# Patient Record
Sex: FEMALE | Race: BLACK OR AFRICAN AMERICAN | NOT HISPANIC OR LATINO | URBAN - METROPOLITAN AREA
[De-identification: names, ages, dates, MRNs, and addresses within clinical notes are randomized per-mention and may not be internally consistent; named-entity substitution may affect disease eponyms.]

---

## 2017-08-09 ENCOUNTER — EMERGENCY (EMERGENCY)
Facility: HOSPITAL | Age: 72
LOS: 1 days | Discharge: HOME | End: 2017-08-09
Attending: EMERGENCY MEDICINE
Payer: COMMERCIAL

## 2017-08-09 VITALS
HEART RATE: 87 BPM | SYSTOLIC BLOOD PRESSURE: 146 MMHG | TEMPERATURE: 98 F | RESPIRATION RATE: 18 BRPM | DIASTOLIC BLOOD PRESSURE: 85 MMHG | OXYGEN SATURATION: 99 %

## 2017-08-09 DIAGNOSIS — Z90.81 ACQUIRED ABSENCE OF SPLEEN: Chronic | ICD-10-CM

## 2017-08-09 PROCEDURE — 84484 ASSAY OF TROPONIN QUANT: CPT

## 2017-08-09 PROCEDURE — 82553 CREATINE MB FRACTION: CPT

## 2017-08-09 PROCEDURE — 71020: CPT | Mod: 26

## 2017-08-09 PROCEDURE — 82550 ASSAY OF CK (CPK): CPT

## 2017-08-09 PROCEDURE — 93005 ELECTROCARDIOGRAM TRACING: CPT

## 2017-08-09 PROCEDURE — 99285 EMERGENCY DEPT VISIT HI MDM: CPT | Mod: 25

## 2017-08-09 PROCEDURE — 99283 EMERGENCY DEPT VISIT LOW MDM: CPT | Mod: 25

## 2017-08-09 PROCEDURE — 85027 COMPLETE CBC AUTOMATED: CPT

## 2017-08-09 PROCEDURE — 83605 ASSAY OF LACTIC ACID: CPT

## 2017-08-09 PROCEDURE — 71046 X-RAY EXAM CHEST 2 VIEWS: CPT

## 2017-08-09 PROCEDURE — 80053 COMPREHEN METABOLIC PANEL: CPT

## 2017-08-09 RX ORDER — IBUPROFEN 200 MG
600 TABLET ORAL ONCE
Qty: 0 | Refills: 0 | Status: DISCONTINUED | OUTPATIENT
Start: 2017-08-09 | End: 2017-08-13

## 2017-08-09 NOTE — ED PROVIDER NOTE - CARE PLAN
Principal Discharge DX:	Cough  Secondary Diagnosis:	Pleuritic pain Principal Discharge DX:	Cough  Secondary Diagnosis:	Pleuritic pain  Secondary Diagnosis:	Compression fracture

## 2017-08-09 NOTE — ED PROVIDER NOTE - OBJECTIVE STATEMENT
71 F with hx of splenectomy, recently treated as outpatient for "chest infection" with course of levaquin, completed last saturday, complaining of cough, pain on cough/deep inspiration worsening today.  Patient denies fevers, had felt better until this AM.  No recent travel, no other complaints.  Patient has hx of PE, currently on A/C therapy. 71 F with hx of splenectomy, recently treated as outpatient for "chest infection" with course of levaquin, completed last saturday, complaining of cough, pain on cough/deep inspiration worsening today starting in am.  Patient denies fevers, had felt better until this AM.  No recent travel, no other complaints.  Patient has hx of PE, currently on A/C therapy.

## 2017-08-09 NOTE — DOWNTIME INTERRUPTION NOTE - WHICH MANUAL FORMS INITIATED?
Emergency Department Triage  Emergency Department Laboratory Orders  Emergency Department Nursing Care Record  Discharge

## 2017-08-09 NOTE — ED PROVIDER NOTE - MEDICAL DECISION MAKING DETAILS
will check for underlying pneumonia, likely pleuresy, well appearing, will control pain and reassess.

## 2017-08-09 NOTE — ED PROVIDER NOTE - PMH
Anemia    Asthma    Cholelithiasis    Compression fracture    Diverticulosis    Gastritis    Hiatal hernia    History of hypertension    ITP (idiopathic thrombocytopenic purpura)    Muscular dystrophy  on Prednisone 40 mg daily for a few years now  Pneumonia    Polymyositis  on Prednisone  Pulmonary embolism  in 11/2015, on Eliquis  Renal cyst, left    Type 2 diabetes mellitus without complication

## 2017-08-11 ENCOUNTER — TRANSCRIPTION ENCOUNTER (OUTPATIENT)
Age: 72
End: 2017-08-11

## 2017-08-13 DIAGNOSIS — M33.20 POLYMYOSITIS, ORGAN INVOLVEMENT UNSPECIFIED: ICD-10-CM

## 2017-08-13 DIAGNOSIS — E11.9 TYPE 2 DIABETES MELLITUS WITHOUT COMPLICATIONS: ICD-10-CM

## 2017-08-13 DIAGNOSIS — I10 ESSENTIAL (PRIMARY) HYPERTENSION: ICD-10-CM

## 2017-08-13 DIAGNOSIS — R05 COUGH: ICD-10-CM

## 2017-08-13 DIAGNOSIS — J45.909 UNSPECIFIED ASTHMA, UNCOMPLICATED: ICD-10-CM

## 2017-08-13 DIAGNOSIS — Z79.4 LONG TERM (CURRENT) USE OF INSULIN: ICD-10-CM

## 2017-08-13 DIAGNOSIS — Z88.1 ALLERGY STATUS TO OTHER ANTIBIOTIC AGENTS STATUS: ICD-10-CM

## 2017-08-13 DIAGNOSIS — Z86.711 PERSONAL HISTORY OF PULMONARY EMBOLISM: ICD-10-CM

## 2017-08-13 DIAGNOSIS — R07.81 PLEURODYNIA: ICD-10-CM

## 2017-08-13 DIAGNOSIS — Z79.01 LONG TERM (CURRENT) USE OF ANTICOAGULANTS: ICD-10-CM

## 2017-08-13 DIAGNOSIS — Z90.81 ACQUIRED ABSENCE OF SPLEEN: ICD-10-CM

## 2017-11-28 ENCOUNTER — APPOINTMENT (OUTPATIENT)
Dept: PULMONOLOGY | Facility: CLINIC | Age: 72
End: 2017-11-28
Payer: MEDICARE

## 2017-11-28 VITALS
SYSTOLIC BLOOD PRESSURE: 143 MMHG | WEIGHT: 111 LBS | HEART RATE: 78 BPM | HEIGHT: 64 IN | OXYGEN SATURATION: 100 % | TEMPERATURE: 98.1 F | DIASTOLIC BLOOD PRESSURE: 86 MMHG | BODY MASS INDEX: 18.95 KG/M2

## 2017-11-28 DIAGNOSIS — Z78.9 OTHER SPECIFIED HEALTH STATUS: ICD-10-CM

## 2017-11-28 DIAGNOSIS — Z86.79 PERSONAL HISTORY OF OTHER DISEASES OF THE CIRCULATORY SYSTEM: ICD-10-CM

## 2017-11-28 DIAGNOSIS — Z82.49 FAMILY HISTORY OF ISCHEMIC HEART DISEASE AND OTHER DISEASES OF THE CIRCULATORY SYSTEM: ICD-10-CM

## 2017-11-28 PROCEDURE — 99203 OFFICE O/P NEW LOW 30 MIN: CPT

## 2019-10-28 ENCOUNTER — EMERGENCY (EMERGENCY)
Facility: HOSPITAL | Age: 74
LOS: 1 days | Discharge: HOME | End: 2019-10-28
Attending: EMERGENCY MEDICINE
Payer: COMMERCIAL

## 2019-10-28 VITALS
RESPIRATION RATE: 22 BRPM | SYSTOLIC BLOOD PRESSURE: 132 MMHG | TEMPERATURE: 98 F | DIASTOLIC BLOOD PRESSURE: 79 MMHG | OXYGEN SATURATION: 99 % | HEART RATE: 96 BPM

## 2019-10-28 VITALS
HEIGHT: 59 IN | RESPIRATION RATE: 24 BRPM | OXYGEN SATURATION: 95 % | HEART RATE: 109 BPM | WEIGHT: 97 LBS | TEMPERATURE: 98 F | SYSTOLIC BLOOD PRESSURE: 154 MMHG | DIASTOLIC BLOOD PRESSURE: 88 MMHG

## 2019-10-28 DIAGNOSIS — Z90.81 ACQUIRED ABSENCE OF SPLEEN: Chronic | ICD-10-CM

## 2019-10-28 LAB
ACETONE SERPL-MCNC: NEGATIVE — SIGNIFICANT CHANGE UP
ALBUMIN SERPL ELPH-MCNC: 3.4 G/DL — LOW (ref 3.5–5)
ALP SERPL-CCNC: 81 U/L — SIGNIFICANT CHANGE UP (ref 40–120)
ALT FLD-CCNC: 22 U/L DA — SIGNIFICANT CHANGE UP (ref 10–60)
ANION GAP SERPL CALC-SCNC: 7 MMOL/L — SIGNIFICANT CHANGE UP (ref 5–17)
APTT BLD: 39.6 SEC — HIGH (ref 27.5–36.3)
AST SERPL-CCNC: 25 U/L — SIGNIFICANT CHANGE UP (ref 10–40)
BASOPHILS # BLD AUTO: 0.02 K/UL — SIGNIFICANT CHANGE UP (ref 0–0.2)
BASOPHILS NFR BLD AUTO: 0.2 % — SIGNIFICANT CHANGE UP (ref 0–2)
BILIRUB SERPL-MCNC: 0.4 MG/DL — SIGNIFICANT CHANGE UP (ref 0.2–1.2)
BUN SERPL-MCNC: 19 MG/DL — HIGH (ref 7–18)
CALCIUM SERPL-MCNC: 9.4 MG/DL — SIGNIFICANT CHANGE UP (ref 8.4–10.5)
CHLORIDE SERPL-SCNC: 110 MMOL/L — HIGH (ref 96–108)
CK SERPL-CCNC: 181 U/L — SIGNIFICANT CHANGE UP (ref 21–215)
CO2 SERPL-SCNC: 25 MMOL/L — SIGNIFICANT CHANGE UP (ref 22–31)
CREAT SERPL-MCNC: 0.87 MG/DL — SIGNIFICANT CHANGE UP (ref 0.5–1.3)
EOSINOPHIL # BLD AUTO: 0 K/UL — SIGNIFICANT CHANGE UP (ref 0–0.5)
EOSINOPHIL NFR BLD AUTO: 0 % — SIGNIFICANT CHANGE UP (ref 0–6)
GLUCOSE SERPL-MCNC: 112 MG/DL — HIGH (ref 70–99)
HCT VFR BLD CALC: 41.5 % — SIGNIFICANT CHANGE UP (ref 34.5–45)
HGB BLD-MCNC: 13.9 G/DL — SIGNIFICANT CHANGE UP (ref 11.5–15.5)
IMM GRANULOCYTES NFR BLD AUTO: 0.6 % — SIGNIFICANT CHANGE UP (ref 0–1.5)
INR BLD: 1.97 RATIO — HIGH (ref 0.88–1.16)
LACTATE SERPL-SCNC: 2 MMOL/L — SIGNIFICANT CHANGE UP (ref 0.7–2)
LYMPHOCYTES # BLD AUTO: 1.19 K/UL — SIGNIFICANT CHANGE UP (ref 1–3.3)
LYMPHOCYTES # BLD AUTO: 12.2 % — LOW (ref 13–44)
MAGNESIUM SERPL-MCNC: 2.2 MG/DL — SIGNIFICANT CHANGE UP (ref 1.6–2.6)
MCHC RBC-ENTMCNC: 30.7 PG — SIGNIFICANT CHANGE UP (ref 27–34)
MCHC RBC-ENTMCNC: 33.5 GM/DL — SIGNIFICANT CHANGE UP (ref 32–36)
MCV RBC AUTO: 91.6 FL — SIGNIFICANT CHANGE UP (ref 80–100)
MONOCYTES # BLD AUTO: 0.68 K/UL — SIGNIFICANT CHANGE UP (ref 0–0.9)
MONOCYTES NFR BLD AUTO: 7 % — SIGNIFICANT CHANGE UP (ref 2–14)
NEUTROPHILS # BLD AUTO: 7.77 K/UL — HIGH (ref 1.8–7.4)
NEUTROPHILS NFR BLD AUTO: 80 % — HIGH (ref 43–77)
NRBC # BLD: 0 /100 WBCS — SIGNIFICANT CHANGE UP (ref 0–0)
NT-PROBNP SERPL-SCNC: 228 PG/ML — HIGH (ref 0–125)
PLATELET # BLD AUTO: 345 K/UL — SIGNIFICANT CHANGE UP (ref 150–400)
POTASSIUM SERPL-MCNC: 3.5 MMOL/L — SIGNIFICANT CHANGE UP (ref 3.5–5.3)
POTASSIUM SERPL-SCNC: 3.5 MMOL/L — SIGNIFICANT CHANGE UP (ref 3.5–5.3)
PROT SERPL-MCNC: 8 G/DL — SIGNIFICANT CHANGE UP (ref 6–8.3)
PROTHROM AB SERPL-ACNC: 22.3 SEC — HIGH (ref 10–12.9)
RBC # BLD: 4.53 M/UL — SIGNIFICANT CHANGE UP (ref 3.8–5.2)
RBC # FLD: 14.4 % — SIGNIFICANT CHANGE UP (ref 10.3–14.5)
SODIUM SERPL-SCNC: 142 MMOL/L — SIGNIFICANT CHANGE UP (ref 135–145)
TROPONIN I SERPL-MCNC: <0.015 NG/ML — SIGNIFICANT CHANGE UP (ref 0–0.04)
WBC # BLD: 9.72 K/UL — SIGNIFICANT CHANGE UP (ref 3.8–10.5)
WBC # FLD AUTO: 9.72 K/UL — SIGNIFICANT CHANGE UP (ref 3.8–10.5)

## 2019-10-28 PROCEDURE — 83735 ASSAY OF MAGNESIUM: CPT

## 2019-10-28 PROCEDURE — 99284 EMERGENCY DEPT VISIT MOD MDM: CPT | Mod: 25

## 2019-10-28 PROCEDURE — 80053 COMPREHEN METABOLIC PANEL: CPT

## 2019-10-28 PROCEDURE — 83605 ASSAY OF LACTIC ACID: CPT

## 2019-10-28 PROCEDURE — 83880 ASSAY OF NATRIURETIC PEPTIDE: CPT

## 2019-10-28 PROCEDURE — 72131 CT LUMBAR SPINE W/O DYE: CPT | Mod: 26

## 2019-10-28 PROCEDURE — 85027 COMPLETE CBC AUTOMATED: CPT

## 2019-10-28 PROCEDURE — 87040 BLOOD CULTURE FOR BACTERIA: CPT

## 2019-10-28 PROCEDURE — 71045 X-RAY EXAM CHEST 1 VIEW: CPT | Mod: 26

## 2019-10-28 PROCEDURE — 36415 COLL VENOUS BLD VENIPUNCTURE: CPT

## 2019-10-28 PROCEDURE — 82550 ASSAY OF CK (CPK): CPT

## 2019-10-28 PROCEDURE — 82009 KETONE BODYS QUAL: CPT

## 2019-10-28 PROCEDURE — 72131 CT LUMBAR SPINE W/O DYE: CPT

## 2019-10-28 PROCEDURE — 93005 ELECTROCARDIOGRAM TRACING: CPT

## 2019-10-28 PROCEDURE — 85610 PROTHROMBIN TIME: CPT

## 2019-10-28 PROCEDURE — 71045 X-RAY EXAM CHEST 1 VIEW: CPT

## 2019-10-28 PROCEDURE — 85730 THROMBOPLASTIN TIME PARTIAL: CPT

## 2019-10-28 PROCEDURE — 99284 EMERGENCY DEPT VISIT MOD MDM: CPT

## 2019-10-28 PROCEDURE — 84484 ASSAY OF TROPONIN QUANT: CPT

## 2019-10-28 RX ORDER — ACETAMINOPHEN 500 MG
650 TABLET ORAL ONCE
Refills: 0 | Status: DISCONTINUED | OUTPATIENT
Start: 2019-10-28 | End: 2019-11-04

## 2019-10-28 NOTE — ED ADULT NURSE NOTE - OBJECTIVE STATEMENT
From UC c/o lower back pain and productive cough with intermittent fever, B/L lower extremity with edema

## 2019-10-28 NOTE — ED PROVIDER NOTE - CHIEF COMPLAINT
The patient is a 74y Female complaining of shortness of breath. The patient is a 74y Female complaining of back pain.

## 2019-10-28 NOTE — ED PROVIDER NOTE - OBJECTIVE STATEMENT
74 female with PMH of multiple fractures, CHF, DVT? and rest unclear came to hospital for bilateral hip pain for many years acutely getting worse today. Pt stated she always had it not related to any exertion or trauma but today she was not able to bear it. She went to urgent care for similar reason who did an X-ray spine and reported some fracture for which she came to hospital. She has vague complaints of chronic dry cough with off and on fever which is not bothering her at the moment. Pt denies weakness in legs, problems with urine, weight loss, recent travel, trauma. No chest pain, abdominal pain, focal weakness.   Home meds: Prednisone 5mg, Fludrocortisone 0.1, Toprol XL 25, Lasix 20, Eliquis 5mg, Platel 50, Norvasc 5. 74 female with PMH of multiple fractures, CHF, DVT?, ITP (s/p splenectomy) and rest unclear came to hospital for bilateral hip pain for many years acutely getting worse today. Pt stated she always had it not related to any exertion or trauma but today she was not able to bear it. She went to urgent care for similar reason who did an X-ray spine and reported some fracture for which she came to hospital. She has vague complaints of chronic dry cough with off and on fever which is not bothering her at the moment. Pt denies weakness in legs, problems with urine, weight loss, recent travel, trauma. No chest pain, abdominal pain, focal weakness.   Home meds: Prednisone 5mg, Fludrocortisone 0.1, Toprol XL 25, Lasix 20, Eliquis 5mg, Platel 50, Norvasc 5.

## 2019-10-28 NOTE — ED PROVIDER NOTE - PHYSICAL EXAMINATION
GENERAL: Well developed, Elderly female, NAD  HEENT:  Normocephalic/Atraumatic, reactive light reflex, moist mucous membranes  NECK: Supple, no JVD  RESP: Symmetric movement of the chest, clear to auscultation bilaterally  CVS: S1 and S2 audible, no murmur, rubs or gallops noted  GI: Normal active bowel sounds present, abdomen soft, non tender, non distended  EXTREMITIES:  2+ edema, no clubbing, cyanosis  MSK: 5/5 strength bilateral upper and lower extremities, bilateral back hip tenderness   PSYCH: Normal mood, normal affect observed  NEURO: Alert and oriented x 3

## 2019-10-28 NOTE — ED PROVIDER NOTE - ATTENDING CONTRIBUTION TO CARE
76 y.o. female with h/o ITP, currently on chronic Prednisone treatment, pt went to Urgent Care for chronic cough for 2 mos., whitish to yellowish sputum, no SOB, fever, recent travelling, also bitemporal HA since this am, no claudication, photophobia, weakness, pt took nothing.  Pt went to Urgent Care, CXR done, told "broken bone", advised to go to ED.  Pt walks with a walker. PE: pt appears to be frail, thin, mucosa- moist, Heart-S1S2RR, Lungs-clear, abd-BS, soft, NT, Ext- no calves tenderness, 2+ pitted edema, lower back-sl tenderness to palp., no CVAT, spine- kyphotic  pt sent in for spine fracture, will get lumbar CT, labs, CXR,

## 2019-10-28 NOTE — ED PROVIDER NOTE - PATIENT PORTAL LINK FT
You can access the FollowMyHealth Patient Portal offered by [facility] by registering at the following website: [email or web address]. By joining [facility]’s FollowMyHealth portal, you will also be able to view your health information using other applications (apps) compatible with our system.

## 2019-11-03 LAB
CULTURE RESULTS: SIGNIFICANT CHANGE UP
CULTURE RESULTS: SIGNIFICANT CHANGE UP
SPECIMEN SOURCE: SIGNIFICANT CHANGE UP
SPECIMEN SOURCE: SIGNIFICANT CHANGE UP

## 2020-01-25 ENCOUNTER — EMERGENCY (EMERGENCY)
Facility: HOSPITAL | Age: 75
LOS: 1 days | Discharge: ANOTHER HOSPITAL | End: 2020-01-25
Attending: EMERGENCY MEDICINE
Payer: COMMERCIAL

## 2020-01-25 ENCOUNTER — INPATIENT (INPATIENT)
Facility: HOSPITAL | Age: 75
LOS: 2 days | Discharge: HOME HEALTH | End: 2020-01-28
Attending: HOSPITALIST | Admitting: HOSPITALIST
Payer: MEDICARE

## 2020-01-25 VITALS
TEMPERATURE: 100 F | WEIGHT: 89.95 LBS | DIASTOLIC BLOOD PRESSURE: 114 MMHG | HEIGHT: 63 IN | HEART RATE: 98 BPM | RESPIRATION RATE: 20 BRPM | SYSTOLIC BLOOD PRESSURE: 180 MMHG | OXYGEN SATURATION: 99 %

## 2020-01-25 VITALS
DIASTOLIC BLOOD PRESSURE: 73 MMHG | SYSTOLIC BLOOD PRESSURE: 116 MMHG | HEIGHT: 63 IN | HEART RATE: 118 BPM | OXYGEN SATURATION: 94 % | RESPIRATION RATE: 17 BRPM | TEMPERATURE: 101 F

## 2020-01-25 VITALS
OXYGEN SATURATION: 100 % | TEMPERATURE: 102 F | RESPIRATION RATE: 17 BRPM | HEART RATE: 114 BPM | SYSTOLIC BLOOD PRESSURE: 175 MMHG | DIASTOLIC BLOOD PRESSURE: 110 MMHG

## 2020-01-25 DIAGNOSIS — Z90.81 ACQUIRED ABSENCE OF SPLEEN: Chronic | ICD-10-CM

## 2020-01-25 LAB
ALBUMIN SERPL ELPH-MCNC: 3.8 G/DL — SIGNIFICANT CHANGE UP (ref 3.5–5)
ALP SERPL-CCNC: 64 U/L — SIGNIFICANT CHANGE UP (ref 40–120)
ALT FLD-CCNC: 23 U/L DA — SIGNIFICANT CHANGE UP (ref 10–60)
ANION GAP SERPL CALC-SCNC: 10 MMOL/L — SIGNIFICANT CHANGE UP (ref 5–17)
AST SERPL-CCNC: 30 U/L — SIGNIFICANT CHANGE UP (ref 10–40)
BASOPHILS # BLD AUTO: 0.05 K/UL — SIGNIFICANT CHANGE UP (ref 0–0.2)
BASOPHILS NFR BLD AUTO: 0.4 % — SIGNIFICANT CHANGE UP (ref 0–2)
BILIRUB SERPL-MCNC: 0.6 MG/DL — SIGNIFICANT CHANGE UP (ref 0.2–1.2)
BUN SERPL-MCNC: 18 MG/DL — SIGNIFICANT CHANGE UP (ref 7–18)
CALCIUM SERPL-MCNC: 8.8 MG/DL — SIGNIFICANT CHANGE UP (ref 8.4–10.5)
CHLORIDE SERPL-SCNC: 109 MMOL/L — HIGH (ref 96–108)
CO2 SERPL-SCNC: 23 MMOL/L — SIGNIFICANT CHANGE UP (ref 22–31)
CREAT SERPL-MCNC: 0.67 MG/DL — SIGNIFICANT CHANGE UP (ref 0.5–1.3)
EOSINOPHIL # BLD AUTO: 0 K/UL — SIGNIFICANT CHANGE UP (ref 0–0.5)
EOSINOPHIL NFR BLD AUTO: 0 % — SIGNIFICANT CHANGE UP (ref 0–6)
GLUCOSE SERPL-MCNC: 107 MG/DL — HIGH (ref 70–99)
HCT VFR BLD CALC: 44.9 % — SIGNIFICANT CHANGE UP (ref 34.5–45)
HGB BLD-MCNC: 15.2 G/DL — SIGNIFICANT CHANGE UP (ref 11.5–15.5)
IMM GRANULOCYTES NFR BLD AUTO: 0.2 % — SIGNIFICANT CHANGE UP (ref 0–1.5)
LACTATE SERPL-SCNC: 1.6 MMOL/L — SIGNIFICANT CHANGE UP (ref 0.7–2)
LYMPHOCYTES # BLD AUTO: 1.63 K/UL — SIGNIFICANT CHANGE UP (ref 1–3.3)
LYMPHOCYTES # BLD AUTO: 11.9 % — LOW (ref 13–44)
MCHC RBC-ENTMCNC: 30.6 PG — SIGNIFICANT CHANGE UP (ref 27–34)
MCHC RBC-ENTMCNC: 33.9 GM/DL — SIGNIFICANT CHANGE UP (ref 32–36)
MCV RBC AUTO: 90.3 FL — SIGNIFICANT CHANGE UP (ref 80–100)
MONOCYTES # BLD AUTO: 1.29 K/UL — HIGH (ref 0–0.9)
MONOCYTES NFR BLD AUTO: 9.4 % — SIGNIFICANT CHANGE UP (ref 2–14)
NEUTROPHILS # BLD AUTO: 10.75 K/UL — HIGH (ref 1.8–7.4)
NEUTROPHILS NFR BLD AUTO: 78.1 % — HIGH (ref 43–77)
NRBC # BLD: 0 /100 WBCS — SIGNIFICANT CHANGE UP (ref 0–0)
PLATELET # BLD AUTO: 323 K/UL — SIGNIFICANT CHANGE UP (ref 150–400)
POTASSIUM SERPL-MCNC: 3.2 MMOL/L — LOW (ref 3.5–5.3)
POTASSIUM SERPL-SCNC: 3.2 MMOL/L — LOW (ref 3.5–5.3)
PROT SERPL-MCNC: 8.4 G/DL — HIGH (ref 6–8.3)
RBC # BLD: 4.97 M/UL — SIGNIFICANT CHANGE UP (ref 3.8–5.2)
RBC # FLD: 15 % — HIGH (ref 10.3–14.5)
SODIUM SERPL-SCNC: 142 MMOL/L — SIGNIFICANT CHANGE UP (ref 135–145)
WBC # BLD: 13.75 K/UL — HIGH (ref 3.8–10.5)
WBC # FLD AUTO: 13.75 K/UL — HIGH (ref 3.8–10.5)

## 2020-01-25 PROCEDURE — 70491 CT SOFT TISSUE NECK W/DYE: CPT

## 2020-01-25 PROCEDURE — 83605 ASSAY OF LACTIC ACID: CPT

## 2020-01-25 PROCEDURE — 36415 COLL VENOUS BLD VENIPUNCTURE: CPT

## 2020-01-25 PROCEDURE — 96374 THER/PROPH/DIAG INJ IV PUSH: CPT | Mod: XU

## 2020-01-25 PROCEDURE — 70491 CT SOFT TISSUE NECK W/DYE: CPT | Mod: 26

## 2020-01-25 PROCEDURE — 99283 EMERGENCY DEPT VISIT LOW MDM: CPT

## 2020-01-25 PROCEDURE — 85027 COMPLETE CBC AUTOMATED: CPT

## 2020-01-25 PROCEDURE — 99284 EMERGENCY DEPT VISIT MOD MDM: CPT | Mod: 25

## 2020-01-25 PROCEDURE — 80053 COMPREHEN METABOLIC PANEL: CPT

## 2020-01-25 PROCEDURE — 96375 TX/PRO/DX INJ NEW DRUG ADDON: CPT | Mod: XU

## 2020-01-25 RX ORDER — METRONIDAZOLE 500 MG
500 TABLET ORAL ONCE
Refills: 0 | Status: COMPLETED | OUTPATIENT
Start: 2020-01-25 | End: 2020-01-25

## 2020-01-25 RX ORDER — POTASSIUM CHLORIDE 20 MEQ
40 PACKET (EA) ORAL ONCE
Refills: 0 | Status: COMPLETED | OUTPATIENT
Start: 2020-01-25 | End: 2020-01-25

## 2020-01-25 RX ORDER — IBUPROFEN 200 MG
600 TABLET ORAL ONCE
Refills: 0 | Status: COMPLETED | OUTPATIENT
Start: 2020-01-25 | End: 2020-01-25

## 2020-01-25 RX ORDER — ACETAMINOPHEN 500 MG
1000 TABLET ORAL ONCE
Refills: 0 | Status: COMPLETED | OUTPATIENT
Start: 2020-01-25 | End: 2020-01-25

## 2020-01-25 RX ADMIN — Medication 600 MILLIGRAM(S): at 22:00

## 2020-01-25 RX ADMIN — Medication 1000 MILLIGRAM(S): at 19:00

## 2020-01-25 RX ADMIN — Medication 100 MILLIGRAM(S): at 21:25

## 2020-01-25 RX ADMIN — Medication 400 MILLIGRAM(S): at 18:25

## 2020-01-25 RX ADMIN — Medication 40 MILLIEQUIVALENT(S): at 21:26

## 2020-01-25 NOTE — ED PROVIDER NOTE - CHIEF COMPLAINT
Spoke with Pt. yesterday. [place] [facility] [place] [facility] the night prior to his procedure. Pt aware of time of procedure and instructions reviewed. Pt denied in further questions, needs or concerns.   The patient is a 74y Female complaining of abscess.

## 2020-01-25 NOTE — ED ADULT NURSE REASSESSMENT NOTE - NS ED NURSE REASSESS COMMENT FT1
pt awake laert oriented x 3 ,pt with chills,repeat vital done dr. Hugo informed with order for motrin 600 for transfer.

## 2020-01-25 NOTE — ED PROVIDER NOTE - OBJECTIVE STATEMENT
74 year old female with PMHx of anemia, asthma, cholelithiasis, diverticulosis, gastritis, hiatal hernia, HTN, ITP, muscular dystrophy, PNA, polymyositis, PE, renal cyst, and DM presenting to the ED with complaints of swelling to the right side of her neck since yesterday. Patient states that the swelling started off small, however, it has been getting larger. Patient denies mouth or dental issues, shortness of breath, fever, or any other acute complaints. Patient notes that she is able to swallow, but she feels a lot pain while doing so.

## 2020-01-25 NOTE — ED ADULT NURSE NOTE - NSIMPLEMENTINTERV_GEN_ALL_ED
Implemented All Fall with Harm Risk Interventions:  [place] to call system. Call bell, personal items and telephone within reach. Instruct patient to call for assistance. Room bathroom lighting operational. Non-slip footwear when patient is off stretcher. Physically safe environment: no spills, clutter or unnecessary equipment. Stretcher in lowest position, wheels locked, appropriate side rails in place. Provide visual cue, wrist band, yellow gown, etc. Monitor gait and stability. Monitor for mental status changes and reorient to person, place, and time. Review medications for side effects contributing to fall risk. Reinforce activity limits and safety measures with patient and family. Provide visual clues: red socks.

## 2020-01-25 NOTE — ED ADULT NURSE REASSESSMENT NOTE - NS ED NURSE REASSESS COMMENT FT1
received pt awake alert oriented x3 not in acute distress,with saline lock intact,no redness,no swelling noted,with family member at bedside,waiting for dispo.

## 2020-01-25 NOTE — ED ADULT TRIAGE NOTE - CHIEF COMPLAINT QUOTE
Pt brought in by ems from [place] for ENT consult. Pt has right side sialolithiases. Pt arrives on Levofloxacin/ Flagyl. Pt tachycardic and slightly hypoxic in triage sating at 94% on 2L via NC. PMH DM, Asthma, HTN

## 2020-01-26 DIAGNOSIS — K11.20 SIALOADENITIS, UNSPECIFIED: ICD-10-CM

## 2020-01-26 DIAGNOSIS — E11.9 TYPE 2 DIABETES MELLITUS WITHOUT COMPLICATIONS: ICD-10-CM

## 2020-01-26 DIAGNOSIS — A41.9 SEPSIS, UNSPECIFIED ORGANISM: ICD-10-CM

## 2020-01-26 DIAGNOSIS — I26.99 OTHER PULMONARY EMBOLISM WITHOUT ACUTE COR PULMONALE: ICD-10-CM

## 2020-01-26 DIAGNOSIS — I50.42 CHRONIC COMBINED SYSTOLIC (CONGESTIVE) AND DIASTOLIC (CONGESTIVE) HEART FAILURE: ICD-10-CM

## 2020-01-26 DIAGNOSIS — M33.20 POLYMYOSITIS, ORGAN INVOLVEMENT UNSPECIFIED: ICD-10-CM

## 2020-01-26 DIAGNOSIS — Z79.899 OTHER LONG TERM (CURRENT) DRUG THERAPY: ICD-10-CM

## 2020-01-26 DIAGNOSIS — Z29.9 ENCOUNTER FOR PROPHYLACTIC MEASURES, UNSPECIFIED: ICD-10-CM

## 2020-01-26 DIAGNOSIS — Z02.9 ENCOUNTER FOR ADMINISTRATIVE EXAMINATIONS, UNSPECIFIED: ICD-10-CM

## 2020-01-26 DIAGNOSIS — I10 ESSENTIAL (PRIMARY) HYPERTENSION: ICD-10-CM

## 2020-01-26 LAB
ALBUMIN SERPL ELPH-MCNC: 2.9 G/DL — LOW (ref 3.3–5)
ALP SERPL-CCNC: 46 U/L — SIGNIFICANT CHANGE UP (ref 40–120)
ALT FLD-CCNC: 11 U/L — SIGNIFICANT CHANGE UP (ref 4–33)
ANION GAP SERPL CALC-SCNC: 15 MMO/L — HIGH (ref 7–14)
AST SERPL-CCNC: 26 U/L — SIGNIFICANT CHANGE UP (ref 4–32)
BASOPHILS # BLD AUTO: 0.06 K/UL — SIGNIFICANT CHANGE UP (ref 0–0.2)
BASOPHILS NFR BLD AUTO: 0.3 % — SIGNIFICANT CHANGE UP (ref 0–2)
BILIRUB SERPL-MCNC: 0.7 MG/DL — SIGNIFICANT CHANGE UP (ref 0.2–1.2)
BUN SERPL-MCNC: 16 MG/DL — SIGNIFICANT CHANGE UP (ref 7–23)
CALCIUM SERPL-MCNC: 7.8 MG/DL — LOW (ref 8.4–10.5)
CHLORIDE SERPL-SCNC: 112 MMOL/L — HIGH (ref 98–107)
CO2 SERPL-SCNC: 16 MMOL/L — LOW (ref 22–31)
CREAT SERPL-MCNC: 0.64 MG/DL — SIGNIFICANT CHANGE UP (ref 0.5–1.3)
EOSINOPHIL # BLD AUTO: 0.01 K/UL — SIGNIFICANT CHANGE UP (ref 0–0.5)
EOSINOPHIL NFR BLD AUTO: 0 % — SIGNIFICANT CHANGE UP (ref 0–6)
GLUCOSE BLDC GLUCOMTR-MCNC: 161 MG/DL — HIGH (ref 70–99)
GLUCOSE BLDC GLUCOMTR-MCNC: 173 MG/DL — HIGH (ref 70–99)
GLUCOSE BLDC GLUCOMTR-MCNC: 175 MG/DL — HIGH (ref 70–99)
GLUCOSE BLDC GLUCOMTR-MCNC: 79 MG/DL — SIGNIFICANT CHANGE UP (ref 70–99)
GLUCOSE SERPL-MCNC: 68 MG/DL — LOW (ref 70–99)
GRAM STN SPEC: SIGNIFICANT CHANGE UP
HBA1C BLD-MCNC: 5.9 % — HIGH (ref 4–5.6)
HCT VFR BLD CALC: 38.7 % — SIGNIFICANT CHANGE UP (ref 34.5–45)
HGB BLD-MCNC: 12.7 G/DL — SIGNIFICANT CHANGE UP (ref 11.5–15.5)
IMM GRANULOCYTES NFR BLD AUTO: 0.6 % — SIGNIFICANT CHANGE UP (ref 0–1.5)
LYMPHOCYTES # BLD AUTO: 1.21 K/UL — SIGNIFICANT CHANGE UP (ref 1–3.3)
LYMPHOCYTES # BLD AUTO: 5.6 % — LOW (ref 13–44)
MAGNESIUM SERPL-MCNC: 1.6 MG/DL — SIGNIFICANT CHANGE UP (ref 1.6–2.6)
MCHC RBC-ENTMCNC: 30.6 PG — SIGNIFICANT CHANGE UP (ref 27–34)
MCHC RBC-ENTMCNC: 32.8 % — SIGNIFICANT CHANGE UP (ref 32–36)
MCV RBC AUTO: 93.3 FL — SIGNIFICANT CHANGE UP (ref 80–100)
MONOCYTES # BLD AUTO: 1.43 K/UL — HIGH (ref 0–0.9)
MONOCYTES NFR BLD AUTO: 6.7 % — SIGNIFICANT CHANGE UP (ref 2–14)
NEUTROPHILS # BLD AUTO: 18.61 K/UL — HIGH (ref 1.8–7.4)
NEUTROPHILS NFR BLD AUTO: 86.8 % — HIGH (ref 43–77)
NRBC # FLD: 0 K/UL — SIGNIFICANT CHANGE UP (ref 0–0)
PHOSPHATE SERPL-MCNC: 2.7 MG/DL — SIGNIFICANT CHANGE UP (ref 2.5–4.5)
PLATELET # BLD AUTO: 246 K/UL — SIGNIFICANT CHANGE UP (ref 150–400)
PMV BLD: 10.8 FL — SIGNIFICANT CHANGE UP (ref 7–13)
POTASSIUM SERPL-MCNC: 3.6 MMOL/L — SIGNIFICANT CHANGE UP (ref 3.5–5.3)
POTASSIUM SERPL-SCNC: 3.6 MMOL/L — SIGNIFICANT CHANGE UP (ref 3.5–5.3)
PROT SERPL-MCNC: 6 G/DL — SIGNIFICANT CHANGE UP (ref 6–8.3)
RBC # BLD: 4.15 M/UL — SIGNIFICANT CHANGE UP (ref 3.8–5.2)
RBC # FLD: 15.5 % — HIGH (ref 10.3–14.5)
SODIUM SERPL-SCNC: 143 MMOL/L — SIGNIFICANT CHANGE UP (ref 135–145)
SPECIMEN SOURCE: SIGNIFICANT CHANGE UP
WBC # BLD: 21.44 K/UL — HIGH (ref 3.8–10.5)
WBC # FLD AUTO: 21.44 K/UL — HIGH (ref 3.8–10.5)

## 2020-01-26 PROCEDURE — 99223 1ST HOSP IP/OBS HIGH 75: CPT | Mod: GC

## 2020-01-26 PROCEDURE — 12345: CPT | Mod: NC,GC

## 2020-01-26 RX ORDER — DEXTROSE 50 % IN WATER 50 %
25 SYRINGE (ML) INTRAVENOUS ONCE
Refills: 0 | Status: DISCONTINUED | OUTPATIENT
Start: 2020-01-26 | End: 2020-01-28

## 2020-01-26 RX ORDER — GLUCAGON INJECTION, SOLUTION 0.5 MG/.1ML
1 INJECTION, SOLUTION SUBCUTANEOUS ONCE
Refills: 0 | Status: DISCONTINUED | OUTPATIENT
Start: 2020-01-26 | End: 2020-01-28

## 2020-01-26 RX ORDER — SODIUM CHLORIDE 9 MG/ML
1000 INJECTION, SOLUTION INTRAVENOUS
Refills: 0 | Status: DISCONTINUED | OUTPATIENT
Start: 2020-01-26 | End: 2020-01-28

## 2020-01-26 RX ORDER — APIXABAN 2.5 MG/1
5 TABLET, FILM COATED ORAL
Refills: 0 | Status: DISCONTINUED | OUTPATIENT
Start: 2020-01-26 | End: 2020-01-28

## 2020-01-26 RX ORDER — DEXAMETHASONE 0.5 MG/5ML
10 ELIXIR ORAL EVERY 8 HOURS
Refills: 0 | Status: DISCONTINUED | OUTPATIENT
Start: 2020-01-26 | End: 2020-01-26

## 2020-01-26 RX ORDER — ACETAMINOPHEN 500 MG
650 TABLET ORAL EVERY 6 HOURS
Refills: 0 | Status: DISCONTINUED | OUTPATIENT
Start: 2020-01-26 | End: 2020-01-28

## 2020-01-26 RX ORDER — INSULIN LISPRO 100/ML
VIAL (ML) SUBCUTANEOUS AT BEDTIME
Refills: 0 | Status: DISCONTINUED | OUTPATIENT
Start: 2020-01-26 | End: 2020-01-28

## 2020-01-26 RX ORDER — INSULIN LISPRO 100/ML
VIAL (ML) SUBCUTANEOUS
Refills: 0 | Status: DISCONTINUED | OUTPATIENT
Start: 2020-01-26 | End: 2020-01-28

## 2020-01-26 RX ORDER — SODIUM CHLORIDE 9 MG/ML
1000 INJECTION INTRAMUSCULAR; INTRAVENOUS; SUBCUTANEOUS ONCE
Refills: 0 | Status: COMPLETED | OUTPATIENT
Start: 2020-01-26 | End: 2020-01-26

## 2020-01-26 RX ORDER — LACTOBACILLUS ACIDOPHILUS 100MM CELL
1 CAPSULE ORAL DAILY
Refills: 0 | Status: DISCONTINUED | OUTPATIENT
Start: 2020-01-26 | End: 2020-01-28

## 2020-01-26 RX ORDER — DEXTROSE 50 % IN WATER 50 %
15 SYRINGE (ML) INTRAVENOUS ONCE
Refills: 0 | Status: DISCONTINUED | OUTPATIENT
Start: 2020-01-26 | End: 2020-01-28

## 2020-01-26 RX ORDER — ACETAMINOPHEN 500 MG
975 TABLET ORAL ONCE
Refills: 0 | Status: COMPLETED | OUTPATIENT
Start: 2020-01-26 | End: 2020-01-26

## 2020-01-26 RX ORDER — DEXTROSE 50 % IN WATER 50 %
12.5 SYRINGE (ML) INTRAVENOUS ONCE
Refills: 0 | Status: DISCONTINUED | OUTPATIENT
Start: 2020-01-26 | End: 2020-01-28

## 2020-01-26 RX ADMIN — Medication 650 MILLIGRAM(S): at 22:30

## 2020-01-26 RX ADMIN — Medication 102 MILLIGRAM(S): at 15:29

## 2020-01-26 RX ADMIN — Medication 1 TABLET(S): at 11:38

## 2020-01-26 RX ADMIN — Medication 975 MILLIGRAM(S): at 01:02

## 2020-01-26 RX ADMIN — Medication 102 MILLIGRAM(S): at 08:10

## 2020-01-26 RX ADMIN — Medication 100 MILLIGRAM(S): at 21:37

## 2020-01-26 RX ADMIN — Medication 100 MILLIGRAM(S): at 07:19

## 2020-01-26 RX ADMIN — Medication 650 MILLIGRAM(S): at 12:32

## 2020-01-26 RX ADMIN — Medication 975 MILLIGRAM(S): at 02:30

## 2020-01-26 RX ADMIN — Medication 650 MILLIGRAM(S): at 13:33

## 2020-01-26 RX ADMIN — APIXABAN 5 MILLIGRAM(S): 2.5 TABLET, FILM COATED ORAL at 17:52

## 2020-01-26 RX ADMIN — SODIUM CHLORIDE 1000 MILLILITER(S): 9 INJECTION INTRAMUSCULAR; INTRAVENOUS; SUBCUTANEOUS at 01:02

## 2020-01-26 RX ADMIN — APIXABAN 5 MILLIGRAM(S): 2.5 TABLET, FILM COATED ORAL at 08:10

## 2020-01-26 RX ADMIN — Medication 1: at 18:24

## 2020-01-26 RX ADMIN — Medication 100 MILLIGRAM(S): at 14:56

## 2020-01-26 RX ADMIN — Medication 650 MILLIGRAM(S): at 21:32

## 2020-01-26 RX ADMIN — Medication 1: at 12:50

## 2020-01-26 NOTE — PROGRESS NOTE ADULT - PROBLEM SELECTOR PLAN 4
Pt does not know what medications she takes,  will bring them in am Pt states she is still taking Eliquis after starting it approx 5 years ago for PE and reports being told to stay on it  - will c/w Eliquis 5 mg bid for now  - clarify with PCP on Monday indication, patient is unsure if she has a history of irregular heart rhythm

## 2020-01-26 NOTE — PROGRESS NOTE ADULT - PROBLEM SELECTOR PLAN 3
Pt on prednisone 5 mg qd chronically  - will c/w prednisone 5 mg after the 24 hrs of decadron 10 mg q8 hr

## 2020-01-26 NOTE — ED PROVIDER NOTE - NS ED ROS FT
GENERAL: No weight changes, nightsweats  EYES: no change in vision  HEENT: HPI   CARDIAC: no chest pain, palpitation   PULMONARY: no productive cough or SOB  GI: no abdominal pain, no nausea or no vomiting, no diarrhea or constipation  : No changes in urination for pain/freq.   SKIN: no rashes, abnormal bruising or bleeding  NEURO: no headache, numbness/tingling, extremity weakness   MSK: No joint pain

## 2020-01-26 NOTE — PROGRESS NOTE ADULT - PROBLEM SELECTOR PLAN 7
Pt's  will bring her medications to the hospital in am, unable to be reached over the phone. Pt's pharmacy is [facility] on [address] in [place] and it opens on Monday

## 2020-01-26 NOTE — ED PROVIDER NOTE - ATTENDING CONTRIBUTION TO CARE
GEN: no acute respiratory distress. nontoxic, speaking comfortably in full sentences  HEENT: NCAT. right facial and neck swelling, tender locally to palpation. PERRL, EOMI, MMM, oropharynx wnl.  Neck: no JVD, trachea midline, no LAD, no stridor  CV: RRR. +S1S2, no murmur.   Chest: CTA B/l. no wheezing, rales, rhonchi. no retractions. good air movement.   ABD: +BS, soft, non distended, non tender. No guarding/rebound.   MSK: No clubbing, cyanosis, edema. FROM of all extremities. no tenderness to palpation. No midline or paraspinal tenderness.   Neuro: AAOX3. Sensation intact, motor 5/5 throughout.   SKIN: No rash    73 yo F  past medical history MS, polymyositis, MS, PE on AC, DM, HTN, anemia with right face/neck swelling and pain, fever since yesterday. seen at [facility] and diagnosed with sialolithiasis on CT, elevated wbc, concern for infection. patient received flagyl/ levofloxacin, IVF. transferred for ENT evaluation. will given IVF, ENT. reassess

## 2020-01-26 NOTE — H&P ADULT - NSHPLABSRESULTS_GEN_ALL_CORE
15.2   13.75 )-----------( 323      ( 25 Jan 2020 18:01 )             44.9     01-25    142  |  109<H>  |  18  ----------------------------<  107<H>  3.2<L>   |  23  |  0.67    Ca    8.8      25 Jan 2020 18:01    TPro  8.4<H>  /  Alb  3.8  /  TBili  0.6  /  DBili  x   /  AST  30  /  ALT  23  /  AlkPhos  64  01-25        Lactate, Blood: 1.6 mmol/L (01-25 @ 18:01)    RADIOLOGY, EKG & ADDITIONAL TESTS: Reviewed.    < from: CT Neck Soft Tissue w/ IV Cont (01.25.20 @ 19:42) >    EXAM:  CT NECK SOFT TISSUE IC                        PROCEDURE DATE:  01/25/2020      INTERPRETATION:  Neck CT  Indication: Right neck swelling, evaluate for an abscess  Technique: Axial images were obtained, along with reformatted coronal and sagittal images. 80 cc of Omnipaque 350 was administered intravenously without complication and 20 cc was discarded.  Comparison: None  Findings:  The visualized portions of the brain are unremarkable.  There is no upper airway obstruction.  There is enlargement and edema surrounding the right submandibular gland. The right submandibular duct is dilated up to 8 mm. There is a 2 mm stone in the right submandibular gland.  Two chunky salivary duct stones in the right submandibular duct measure 6 mm.  The epiglottis is normal. Fluid in the right piriform sinus.  There is no focal collection.  Surgical clips in the right aspect of the neck. The visualized vascular structures are unremarkable.  The thyroid gland is unremarkable.    IMPRESSION:  Right submandibular gland sialoadenitis related to sialolithiasis with 2 stones measuring 6 mm near the midline. No focal collection.

## 2020-01-26 NOTE — H&P ADULT - PROBLEM SELECTOR PLAN 6
Pt states she is still taking Eliquis, but states she takes 5 mg once a day. Will need to clarify dosing  - will start on Eliquis 5 mg bid for now

## 2020-01-26 NOTE — H&P ADULT - PROBLEM SELECTOR PLAN 9
Transitions of Care Status:  1.  Name of PCP: Dr. Sherrie Aguilar  2.  PCP Contacted on Admission: [ ] Y    [x ] N    3.  PCP contacted at Discharge: [ ] Y    [ ] N    [ ] N/A  4.  Post-Discharge Appointment Date and Location:  5.  Summary of Handoff given to PCP:

## 2020-01-26 NOTE — H&P ADULT - NSHPPHYSICALEXAM_GEN_ALL_CORE
GENERAL: NAD, well-groomed, well-developed  HEAD:  Atraumatic, Normocephalic  EYES: EOMI, PERRLA, conjunctiva and sclera clear  ENMT: No tonsillar erythema, exudates, or enlargement; Moist mucous membranes  NECK: Supple, No JVD, Normal thyroid  HEART: Regular rate and rhythm; No murmurs, rubs, or gallops  RESPIRATORY: CTA B/L, No W/R/R  ABDOMEN: Soft, Nontender, Nondistended; Bowel sounds present  NEUROLOGY: A&Ox3, nonfocal, moving all extremities  EXTREMITIES:  2+ Peripheral Pulses, No clubbing, cyanosis, or edema  SKIN: warm, dry, normal color, no rash or abnormal lesions GENERAL: NAD, well-groomed, well-developed  HEAD:  Atraumatic, Normocephalic  EYES: EOMI, PERRLA, conjunctiva and sclera clear  ENMT: No tonsillar erythema, exudates. Moist mucous membranes  NECK: swelling on the R side of neck, TTP  HEART: Regular rate and rhythm; No murmurs, rubs, or gallops  RESPIRATORY: CTA B/L, No W/R/R  ABDOMEN: Soft, Nontender, Nondistended; Bowel sounds present  NEUROLOGY: A&Ox3, nonfocal, moving all extremities  EXTREMITIES:  2+ Peripheral Pulses, No clubbing, cyanosis, or edema  SKIN: warm, dry, normal color, no rash

## 2020-01-26 NOTE — PROGRESS NOTE ADULT - ASSESSMENT
Pt is a 73 y/o F w/ PMHx anemia, asthma, cholelithiasis, diverticulosis, gastritis, hiatal hernia, DM2, HTN, ITP, muscular dystrophy, polymyositis, PE, presenting with swelling of the R side of her neck for 2 days. Pt septic on admission at [facility]. CT neck showed right submandibular gland sialoadenitis related to sialolithiasis with 2 stones measuring 6 mm near the midline. Transferred here for further workup. 75 y/o F w/ PMH HTN, DM2 (diet controlled), chronic systolic and diastolic HF (on unspecified diuretic), ITP, muscular dystrophy, polymyositis (on prednisone 5 QD), PE approx 5 years ago treated with eliquis, asthma (on albuterol PRN) presenting with acute worsening of several years of R submandibular swelling admitted for sepsis 2/2 right submandibular gland sialoadenitis a/w sialolithiasis up to 6 mm.

## 2020-01-26 NOTE — PROGRESS NOTE ADULT - PROBLEM SELECTOR PLAN 6
Pt states she is still taking Eliquis, but states she takes 5 mg once a day. Will need to clarify dosing  - will start on Eliquis 5 mg bid for now Pt does not know what medications she takes,  will bring them in today  - monitor off antihypertensives as BP controlled and recent sepsis

## 2020-01-26 NOTE — H&P ADULT - PROBLEM SELECTOR PLAN 4
Transitions of Care Status:  1.  Name of PCP:   2.  PCP Contacted on Admission: [ ] Y    [ ] N    3.  PCP contacted at Discharge: [ ] Y    [ ] N    [ ] N/A  4.  Post-Discharge Appointment Date and Location:  5.  Summary of Handoff given to PCP: Pt does not know what medications she takes,  will bring them in am

## 2020-01-26 NOTE — ED PROVIDER NOTE - PHYSICAL EXAMINATION
General: NAD, good hygiene, well developed  HENT: Atraumatic, EOMI, xerostomia, no conjunctivae injection, moist mucosa, no airway compromise, no post. oropharynx erythema, exudates  Neck: R neck swelling, tender to palpation, normal ROM and trachea midline   Cardiovascular: RRR, S1&2, no M or R, radial pulses equal and b/l  Respiratory: CTABL, no wheezes or crackles, no decreased breath sounds  Abdominal: soft and non-tender non distended, neg for guarding, correia's sign, rovsing's sign, mcburney's sign, no CVA tenderness   Extremities: no edema of the legs/feet, DP/PT equal b/l  Skin: warm, well perfused  Neurologic: nonfocal, AAOx3  Psych: normal mood and affect

## 2020-01-26 NOTE — PROGRESS NOTE ADULT - PROBLEM SELECTOR PLAN 5
Pt denies having DM2, will check medications in the am  - f/u Hgb A1C  - will place on ISS for now while on decadron Pt denies having DM2 and denies taking any antihyperglycemics  - A1C 5.9  - will place on ISS for now while on decadron

## 2020-01-26 NOTE — ED ADULT NURSE NOTE - CHIEF COMPLAINT QUOTE
Pt brought in by ems from [place] for ENT consult. Pt has right side sialolithiases. Pt arrives on Levofloxacin/ Flagyl. Pt tachycardic and slightly hypoxic in triage sating at 94% on 2L via NC. PMH DM, Asthma, HTN

## 2020-01-26 NOTE — H&P ADULT - PROBLEM SELECTOR PLAN 3
DVT ppx: Eliquis  Diet: Pt on prednisone 5 mg qd chronically  - will c/w prednisone 5 mg after the 24 hrs of decadron 10 mg q8 hr

## 2020-01-26 NOTE — ED ADULT NURSE NOTE - CHPI ED NUR SYMPTOMS NEG
no loss of consciousness/no nausea/no numbness/no fever/no vomiting/no weakness/no chills/no syncope/no bleeding gums

## 2020-01-26 NOTE — H&P ADULT - HISTORY OF PRESENT ILLNESS
Nurse is aware of this   Pt is a 73 y/o F w/ PMHx anemia, asthma, cholelithiasis, diverticulosis, gastritis, hiatal hernia, DM2, HTN, ITP, muscular dystrophy, polymyositis, PE, presenting with swelling of the R side of her neck for 2 days. Pt was initially seen at [facility] and was found to have a fever and sialoadenitis on CT and was transferred to the [facility] for ENT consultation.     In ED:  VS: Temp 100.7, , /73, RR 17 94% on 2L NC  Labs: WBC 13.75, K 3.2  Imaging: CT neck soft tissue shows right submandibular gland sialoadenitis related to sialolithiasis with 2 stones measuring 6 mm near the midline   Interventions: 1L NS, Tylenol 975 mg Pt is a 73 y/o F w/ PMHx anemia, asthma, cholelithiasis, diverticulosis, gastritis, hiatal hernia, DM2, HTN, ITP, muscular dystrophy, polymyositis, PE, presenting with swelling of the R side of her neck for 2 days. Pt was initially seen at [facility] and was found to have a fever and sialoadenitis on CT and was transferred to the [facility] for ENT consultation. Pt received flagyl and levaquin at [facility].     In ED:  VS: Temp 100.7, , /73, RR 17, 94% on 2L NC  Labs: WBC 13.75, K 3.2  Imaging: CT neck soft tissue shows right submandibular gland sialoadenitis related to sialolithiasis with 2 stones measuring 6 mm near the midline   Interventions: 1L NS, Tylenol 975 mg Pt is a 75 y/o F w/ PMHx anemia, asthma, cholelithiasis, diverticulosis, gastritis, hiatal hernia, DM2, HTN, ITP, muscular dystrophy, polymyositis, PE, presenting with swelling of the R side of her neck for 2 days. The area is tender to palpation. Pt endorses difficulty swallowing but has been able to eat. She denies fevers, chills, SOB. She has no dental complaints or recent dental procedures. Pt was initially seen at [facility] and was found to have a fever and sialoadenitis on CT, and was transferred to the [facility] for ENT consultation. Pt received flagyl and levaquin at [facility].     In ED:  VS: Temp 100.7, , /73, RR 17, 94% on 2L NC  Labs: WBC 13.75, K 3.2  Imaging: CT neck soft tissue shows right submandibular gland sialoadenitis related to sialolithiasis with 2 stones measuring 6 mm near the midline   Interventions: 1L NS, Tylenol 975 mg

## 2020-01-26 NOTE — H&P ADULT - NSHPREVIEWOFSYSTEMS_GEN_ALL_CORE
CONSTITUTIONAL: No weakness, fatigue, malaise, fevers or chills, no weight change, appetite change  EYES: No visual changes; No double vision,  No vertigo, eye pain  Ears: no otalgia, no otorhea, no hearing loss, tinnitus  Nose: no epistaxis, rhinorrhea, post-discharge, sinus pressure  Throat: no throat pain, no oral lesions, tooth pain   NECK: No pain or stiffness  RESPIRATORY: No cough (productive or dry), wheezing, hemoptysis; No shortness of breath, orthnopnea, PND, BOLTON, snoring,  CARDIOVASCULAR: No chest pain or palpitations, no leg edema, no claudication    GASTROINTESTINAL: No abdominal or epigastric pain. No nausea, vomiting, or hematemesis; No diarrhea or constipation. No melena or hematochezia.  GENITOURINARY: No dysuria, frequency, urgency or hematuria, no pelvic pain, urinary incontinence, urgency  Muscloskeletal: no joints or muscle pain, no swelling in joints or muscles  NEUROLOGICAL: No numbness or weakness, headache, memory loss, seizures, dizziness, vertigo, syncope, ataxia  SKIN: No pruritis, rashes, lesions or new moles  Psych: No anxiety, sadness, insomnia, suicide thoughts  Endocrine: No Heat or Cold intolerance, polydipsia, polyphagia  Heme/Lymph: no LN enlargement, no easy bruising or bleeding CONSTITUTIONAL: No weakness, fatigue, malaise, fevers or chills   EYES: No visual changes   Nose: no epistaxis, no rhinorrhea   NECK: + pain   RESPIRATORY: No cough (productive or dry), wheezing, hemoptysis; SOB, BOLTON  CARDIOVASCULAR: No chest pain or palpitations, no leg edema   GASTROINTESTINAL: No abdominal or epigastric pain. No nausea, vomiting; No diarrhea or constipation   GENITOURINARY: No dysuria, frequency, urgency   MSK: no joints or muscle pain, no swelling in joints or muscles  NEUROLOGICAL: No numbness or weakness, headache   SKIN: No pruritis, rashes, lesions

## 2020-01-26 NOTE — ED PROVIDER NOTE - CLINICAL SUMMARY MEDICAL DECISION MAKING FREE TEXT BOX
right sided neck/facial swelling ct showed sialoadenitis, transferred for ENT, will start IV abx for fever and concerning for infection. c/s ent for drainage. no airway compromised.

## 2020-01-26 NOTE — H&P ADULT - NSICDXPASTMEDICALHX_GEN_ALL_CORE_FT
PAST MEDICAL HISTORY:  Anemia     Asthma     Cholelithiasis     Compression fracture     Diverticulosis     Gastritis     Hiatal hernia     History of hypertension     ITP (idiopathic thrombocytopenic purpura)     Muscular dystrophy on Prednisone 40 mg daily for a few years now    Pneumonia     Polymyositis on Prednisone    Pulmonary embolism in 11/2015, on Eliquis    Renal cyst, left     Type 2 diabetes mellitus without complication PAST MEDICAL HISTORY:  Anemia     Asthma     Cholelithiasis     Compression fracture     Diverticulosis     Gastritis     Hiatal hernia     History of hypertension     ITP (idiopathic thrombocytopenic purpura)     Muscular dystrophy     Pneumonia     Polymyositis on Prednisone    Pulmonary embolism in 11/2015, on Eliquis    Renal cyst, left     Type 2 diabetes mellitus without complication

## 2020-01-26 NOTE — PROGRESS NOTE ADULT - PROBLEM SELECTOR PLAN 2
Pt with worsening R sided neck swelling, found to have right submandibular gland sialoadenitis related to sialolithiasis with 2 stones measuring 6 mm near the midline on CT neck. Pt evaluated by ENT  - f/u wound culture  - IV clindamycin 600 mg q8 hr  - decadron 10 q8 hours for 24 hours  - reiterated to patient to massage right submandibular gland q4 hours for 15 minutes   - Warm compresses to right submandibular gland q4 hours for 20 minutes    - Sialagogues (lemon or sour candies) q4 hours while awake.  will bring some for her  - pt states she is tolerating po TTE 1/2016: EF 25%, severe global LVSD, grade IV diastolic dysfunction, mod pHTN  - appears euvolemic on exam  - med rec today

## 2020-01-26 NOTE — ED PROVIDER NOTE - OBJECTIVE STATEMENT
74 year old female with PMHx of anemia, asthma, cholelithiasis, diverticulosis, gastritis, hiatal hernia, HTN, ITP, muscular dystrophy, PNA, polymyositis, PE, renal cyst, and DM presenting to the ED with complaints of swelling to the right side of her neck for 2 days w. odynophagia. persistent xerostomia. Pt was evaluated at [place] and found to have fever and sialoadenitis on CT and transferred to [facility] for ENT consult. Pt received tylenol and started on flagyl and levaquin.

## 2020-01-26 NOTE — CONSULT NOTE ADULT - SUBJECTIVE AND OBJECTIVE BOX
HPI:  Patient is a 74y Female with PMH significant for anemia, asthma, cholelithiasis, diverticulosis, gastritis, hiatal hernia, HTN, muscular dystrophy, polymyositis, PE, renal cyst, DM transferred to [facility] from [facility] for right silaolithiasis. Presented to urgent care center today for right facial/mouth pain x1 month. Associated with pain with eating and xerostomia. Still able to tolerate po. Denies difficulty breathing, shortness of breath, vomiting. At [facility], found to have r sialolithiasis and right submandibular gland infection on CT. Started on flagyl and levaquin. Febrile up to 100.7, WBC 13.    Physical Exam  T(C): 36.8 (01-26-20 @ 00:25), Max: 38.7 (01-25-20 @ 21:55)  HR: 90 (01-26-20 @ 00:25) (90 - 118)  BP: 118/79 (01-26-20 @ 00:25) (116/73 - 180/114)  RR: 18 (01-26-20 @ 00:25) (17 - 20)  SpO2: 99% (01-26-20 @ 00:25) (94% - 100%)    General: NAD, A+Ox3  No respiratory distress, stridor, or stertor  Voice quality: normal  Face:  Symmetric without masses or lesions  OU: PERRL, EOMI  AD: Pinna wnl, EAC clear, TM intact, no effusion  AS: Pinna wnl, EAC clear, TM intact, no effusion  Nose: nasal cavity clear bilaterally, inferior turbinates normal, mucosa normal without crusting or bleeding  OC/OP: tongue normal, floor of mouth with white pus expressed from Right dino's duct, mild ttp, no masses or lesions, OP clear  Neck: right neck with swelling and mild ttp  CNII-XII intact    CT neck 1/26  IMPRESSION:     Right submandibular gland sialoadenitis related to sialolithiasis with 2   stones measuring 6 mm near the midline. No focal collection.       A&P  74F with right sialolithiasis (x2 stones) and right submandibular gland sialoadenitis. Febrile with leukocytosis.  - f/u wound culture  - IV clindamycin  - decadron 10 q8 hours x24 hours  - Massage to right submandibular gland q4 hours for 15 minutes while awake  - Warm compresses to right submandibular gland q4 hours for 20 minutes while awake  - Sialagogues (lemon or sour candies) q4 hours while awake  - NSAIDs for pain if tolerated  - Aggressive PO hydration, consider IVF if unable to tolerate PO  - Page or call with questions   - discussed with attending

## 2020-01-26 NOTE — PROGRESS NOTE ADULT - PROBLEM SELECTOR PLAN 1
Pt leukocytic, febrile, tachycardic on admission with sialoadenitis identified on imaging. Per ENT note, white pus expressed from right dino's duct.  - c/w clindamycin 600 mg IV  - f/u Cx  - plan as below for sialodenitis Right submandibular gland sialoadenitis related to sialolithiasis with 2 stones measuring 6 mm near the midline on CT neck. Pt evaluated by ENT. Per ENT note, white pus expressed from right dino's duct.  - f/u wound culture  - IV clindamycin 600 mg q8 hr  - decadron 10 q8 hours for 24 hours  - reiterated to patient to massage right submandibular gland q4 hours for 15 minutes   - warm compresses to right submandibular gland q4 hours for 20 minutes    - sialagogues (lemon or sour candies) q4 hours while awake.  will bring some for her  - pt states she is tolerating po  - tylenol PRN

## 2020-01-26 NOTE — H&P ADULT - PROBLEM SELECTOR PLAN 7
Pt's  will bring her medications to the hospital in am, unable to be reached over the phone. Pt's pharmacy is [facility] on [address] in [place] and it opens on Monday

## 2020-01-26 NOTE — ED ADULT NURSE NOTE - OBJECTIVE STATEMENT
received to room 15. sent in from [facility] for ENT eval for right sided sialolithiasis. pt has been having right sided jaw/throat pain and swelling since thursday. denies any difficulty breathing or speaking. airway appears patent. arrives with 20 g iv in right forearm with NS and levaquin infusing. MD at bedside for eval.

## 2020-01-26 NOTE — H&P ADULT - PROBLEM SELECTOR PLAN 1
Pt s/p Pt evaluated by ENT  - f/u wound culture  - IV clindamycin 600 mg q8 hr  - decadron 10 q8 hours for 24 hours  - reiterated to patient to massage right submandibular gland q4 hours for 15 minutes   - Warm compresses to right submandibular gland q4 hours for 20 minutes    - Sialagogues (lemon or sour candies) q4 hours while awake.  will bring some for her  - pt states she is tolerating po Pt leukocytic, febrile, tachycardic on admission with sialoadenitis identified on imaging. Per ENT note, white pus expressed from Right dino's duct.  - c/w clindamycin  - f/u Cx  - plan as below for sialodenitis Pt leukocytic, febrile, tachycardic on admission with sialoadenitis identified on imaging. Per ENT note, white pus expressed from right dino's duct.  - c/w clindamycin 600 mg IV  - f/u Cx  - plan as below for sialodenitis

## 2020-01-26 NOTE — H&P ADULT - PROBLEM SELECTOR PLAN 2
Pt on prednisone 5 mg qd chronically  - will c/w prednisone 5 mg after the 24 hrs of decadron 10 mg q8 hr Pt with worsening R sided neck swelling, found to have right submandibular gland sialoadenitis related to sialolithiasis with 2 stones measuring 6 mm near the midline on CT neck. Pt evaluated by ENT  - f/u wound culture  - IV clindamycin 600 mg q8 hr  - decadron 10 q8 hours for 24 hours  - reiterated to patient to massage right submandibular gland q4 hours for 15 minutes   - Warm compresses to right submandibular gland q4 hours for 20 minutes    - Sialagogues (lemon or sour candies) q4 hours while awake.  will bring some for her  - pt states she is tolerating po

## 2020-01-26 NOTE — H&P ADULT - ASSESSMENT
Pt is a 75 y/o F w/ PMHx anemia, asthma, cholelithiasis, diverticulosis, gastritis, hiatal hernia, DM2, HTN, ITP, muscular dystrophy, polymyositis, PE, presenting with swelling of the R side of her neck for 2 days. Pt is a 75 y/o F w/ PMHx anemia, asthma, cholelithiasis, diverticulosis, gastritis, hiatal hernia, DM2, HTN, ITP, muscular dystrophy, polymyositis, PE, presenting with swelling of the R side of her neck for 2 days. Pt septic on admission. CT neck showed right submandibular gland sialoadenitis related to sialolithiasis with 2 stones measuring 6 mm near the midline. Pt is a 75 y/o F w/ PMHx anemia, asthma, cholelithiasis, diverticulosis, gastritis, hiatal hernia, DM2, HTN, ITP, muscular dystrophy, polymyositis, PE, presenting with swelling of the R side of her neck for 2 days. Pt septic on admission at [facility]. CT neck showed right submandibular gland sialoadenitis related to sialolithiasis with 2 stones measuring 6 mm near the midline. Transferred here for further workup.

## 2020-01-27 ENCOUNTER — TRANSCRIPTION ENCOUNTER (OUTPATIENT)
Age: 75
End: 2020-01-27

## 2020-01-27 LAB
ANION GAP SERPL CALC-SCNC: 12 MMO/L — SIGNIFICANT CHANGE UP (ref 7–14)
BASOPHILS # BLD AUTO: 0.02 K/UL — SIGNIFICANT CHANGE UP (ref 0–0.2)
BASOPHILS NFR BLD AUTO: 0.1 % — SIGNIFICANT CHANGE UP (ref 0–2)
BUN SERPL-MCNC: 20 MG/DL — SIGNIFICANT CHANGE UP (ref 7–23)
CALCIUM SERPL-MCNC: 8.9 MG/DL — SIGNIFICANT CHANGE UP (ref 8.4–10.5)
CHLORIDE SERPL-SCNC: 110 MMOL/L — HIGH (ref 98–107)
CO2 SERPL-SCNC: 17 MMOL/L — LOW (ref 22–31)
CREAT SERPL-MCNC: 0.61 MG/DL — SIGNIFICANT CHANGE UP (ref 0.5–1.3)
EOSINOPHIL # BLD AUTO: 0 K/UL — SIGNIFICANT CHANGE UP (ref 0–0.5)
EOSINOPHIL NFR BLD AUTO: 0 % — SIGNIFICANT CHANGE UP (ref 0–6)
GLUCOSE BLDC GLUCOMTR-MCNC: 118 MG/DL — HIGH (ref 70–99)
GLUCOSE BLDC GLUCOMTR-MCNC: 124 MG/DL — HIGH (ref 70–99)
GLUCOSE BLDC GLUCOMTR-MCNC: 86 MG/DL — SIGNIFICANT CHANGE UP (ref 70–99)
GLUCOSE BLDC GLUCOMTR-MCNC: 93 MG/DL — SIGNIFICANT CHANGE UP (ref 70–99)
GLUCOSE SERPL-MCNC: 111 MG/DL — HIGH (ref 70–99)
HCT VFR BLD CALC: 39.1 % — SIGNIFICANT CHANGE UP (ref 34.5–45)
HGB BLD-MCNC: 13.4 G/DL — SIGNIFICANT CHANGE UP (ref 11.5–15.5)
IMM GRANULOCYTES NFR BLD AUTO: 0.8 % — SIGNIFICANT CHANGE UP (ref 0–1.5)
LYMPHOCYTES # BLD AUTO: 1.72 K/UL — SIGNIFICANT CHANGE UP (ref 1–3.3)
LYMPHOCYTES # BLD AUTO: 9.6 % — LOW (ref 13–44)
MAGNESIUM SERPL-MCNC: 1.9 MG/DL — SIGNIFICANT CHANGE UP (ref 1.6–2.6)
MCHC RBC-ENTMCNC: 31.2 PG — SIGNIFICANT CHANGE UP (ref 27–34)
MCHC RBC-ENTMCNC: 34.3 % — SIGNIFICANT CHANGE UP (ref 32–36)
MCV RBC AUTO: 91.1 FL — SIGNIFICANT CHANGE UP (ref 80–100)
MONOCYTES # BLD AUTO: 0.75 K/UL — SIGNIFICANT CHANGE UP (ref 0–0.9)
MONOCYTES NFR BLD AUTO: 4.2 % — SIGNIFICANT CHANGE UP (ref 2–14)
NEUTROPHILS # BLD AUTO: 15.2 K/UL — HIGH (ref 1.8–7.4)
NEUTROPHILS NFR BLD AUTO: 85.3 % — HIGH (ref 43–77)
NRBC # FLD: 0 K/UL — SIGNIFICANT CHANGE UP (ref 0–0)
PHOSPHATE SERPL-MCNC: 2.5 MG/DL — SIGNIFICANT CHANGE UP (ref 2.5–4.5)
PLATELET # BLD AUTO: 253 K/UL — SIGNIFICANT CHANGE UP (ref 150–400)
PMV BLD: 11 FL — SIGNIFICANT CHANGE UP (ref 7–13)
POTASSIUM SERPL-MCNC: 3.4 MMOL/L — LOW (ref 3.5–5.3)
POTASSIUM SERPL-SCNC: 3.4 MMOL/L — LOW (ref 3.5–5.3)
RBC # BLD: 4.29 M/UL — SIGNIFICANT CHANGE UP (ref 3.8–5.2)
RBC # FLD: 15.5 % — HIGH (ref 10.3–14.5)
SODIUM SERPL-SCNC: 139 MMOL/L — SIGNIFICANT CHANGE UP (ref 135–145)
WBC # BLD: 17.84 K/UL — HIGH (ref 3.8–10.5)
WBC # FLD AUTO: 17.84 K/UL — HIGH (ref 3.8–10.5)

## 2020-01-27 PROCEDURE — 99233 SBSQ HOSP IP/OBS HIGH 50: CPT | Mod: GC

## 2020-01-27 RX ORDER — METOPROLOL TARTRATE 50 MG
25 TABLET ORAL DAILY
Refills: 0 | Status: DISCONTINUED | OUTPATIENT
Start: 2020-01-27 | End: 2020-01-27

## 2020-01-27 RX ORDER — FUROSEMIDE 40 MG
20 TABLET ORAL DAILY
Refills: 0 | Status: DISCONTINUED | OUTPATIENT
Start: 2020-01-27 | End: 2020-01-28

## 2020-01-27 RX ORDER — METOPROLOL TARTRATE 50 MG
25 TABLET ORAL DAILY
Refills: 0 | Status: DISCONTINUED | OUTPATIENT
Start: 2020-01-27 | End: 2020-01-28

## 2020-01-27 RX ORDER — FUROSEMIDE 40 MG
1 TABLET ORAL
Qty: 0 | Refills: 0 | DISCHARGE

## 2020-01-27 RX ORDER — POTASSIUM CHLORIDE 20 MEQ
20 PACKET (EA) ORAL ONCE
Refills: 0 | Status: COMPLETED | OUTPATIENT
Start: 2020-01-27 | End: 2020-01-27

## 2020-01-27 RX ORDER — FLUDROCORTISONE ACETATE 0.1 MG/1
0.1 TABLET ORAL DAILY
Refills: 0 | Status: DISCONTINUED | OUTPATIENT
Start: 2020-01-27 | End: 2020-01-28

## 2020-01-27 RX ORDER — CILOSTAZOL 100 MG/1
50 TABLET ORAL
Refills: 0 | Status: DISCONTINUED | OUTPATIENT
Start: 2020-01-27 | End: 2020-01-28

## 2020-01-27 RX ORDER — FUROSEMIDE 40 MG
40 TABLET ORAL DAILY
Refills: 0 | Status: DISCONTINUED | OUTPATIENT
Start: 2020-01-27 | End: 2020-01-27

## 2020-01-27 RX ADMIN — CILOSTAZOL 50 MILLIGRAM(S): 100 TABLET ORAL at 17:43

## 2020-01-27 RX ADMIN — Medication 100 MILLIGRAM(S): at 14:00

## 2020-01-27 RX ADMIN — Medication 100 MILLIGRAM(S): at 22:19

## 2020-01-27 RX ADMIN — Medication 650 MILLIGRAM(S): at 12:47

## 2020-01-27 RX ADMIN — Medication 650 MILLIGRAM(S): at 23:19

## 2020-01-27 RX ADMIN — FLUDROCORTISONE ACETATE 0.1 MILLIGRAM(S): 0.1 TABLET ORAL at 17:43

## 2020-01-27 RX ADMIN — APIXABAN 5 MILLIGRAM(S): 2.5 TABLET, FILM COATED ORAL at 17:43

## 2020-01-27 RX ADMIN — Medication 20 MILLIGRAM(S): at 17:43

## 2020-01-27 RX ADMIN — Medication 25 MILLIGRAM(S): at 17:43

## 2020-01-27 RX ADMIN — Medication 650 MILLIGRAM(S): at 11:47

## 2020-01-27 RX ADMIN — APIXABAN 5 MILLIGRAM(S): 2.5 TABLET, FILM COATED ORAL at 06:41

## 2020-01-27 RX ADMIN — Medication 5 MILLIGRAM(S): at 11:47

## 2020-01-27 RX ADMIN — Medication 100 MILLIGRAM(S): at 06:42

## 2020-01-27 RX ADMIN — Medication 20 MILLIEQUIVALENT(S): at 11:47

## 2020-01-27 RX ADMIN — Medication 650 MILLIGRAM(S): at 22:19

## 2020-01-27 RX ADMIN — Medication 1 TABLET(S): at 11:47

## 2020-01-27 NOTE — PROGRESS NOTE ADULT - SUBJECTIVE AND OBJECTIVE BOX
Kalpesh Rivera, PGY-1  [phone]    Patient is a 74y old  Female who presents with a chief complaint of sialoadenitis (27 Jan 2020 05:46)      SUBJECTIVE/OVERNIGHT EVENTS: No AE ON. Patient seen and examined at bedside this AM.      MEDICATIONS  (STANDING):  apixaban 5 milliGRAM(s) Oral two times a day  clindamycin IVPB      clindamycin IVPB 600 milliGRAM(s) IV Intermittent every 8 hours  dextrose 5%. 1000 milliLiter(s) (50 mL/Hr) IV Continuous <Continuous>  dextrose 50% Injectable 12.5 Gram(s) IV Push once  dextrose 50% Injectable 25 Gram(s) IV Push once  dextrose 50% Injectable 25 Gram(s) IV Push once  insulin lispro (HumaLOG) corrective regimen sliding scale   SubCutaneous three times a day before meals  insulin lispro (HumaLOG) corrective regimen sliding scale   SubCutaneous at bedtime  lactobacillus acidophilus 1 Tablet(s) Oral daily    MEDICATIONS  (PRN):  acetaminophen   Tablet .. 650 milliGRAM(s) Oral every 6 hours PRN Temp greater or equal to 38C (100.4F), Mild Pain (1 - 3)  dextrose 40% Gel 15 Gram(s) Oral once PRN Blood Glucose LESS THAN 70 milliGRAM(s)/deciliter  glucagon  Injectable 1 milliGRAM(s) IntraMuscular once PRN Glucose LESS THAN 70 milligrams/deciliter    CAPILLARY BLOOD GLUCOSE      POCT Blood Glucose.: 175 mg/dL (26 Jan 2020 22:01)  POCT Blood Glucose.: 161 mg/dL (26 Jan 2020 18:00)  POCT Blood Glucose.: 173 mg/dL (26 Jan 2020 12:42)  POCT Blood Glucose.: 79 mg/dL (26 Jan 2020 08:43)    I&O's Summary        Vital Signs Last 24 Hrs  T(C): 36.4 (27 Jan 2020 06:40), Max: 36.9 (26 Jan 2020 07:23)  T(F): 97.6 (27 Jan 2020 06:40), Max: 98.5 (26 Jan 2020 07:23)  HR: 71 (27 Jan 2020 06:40) (71 - 77)  BP: 149/73 (27 Jan 2020 06:40) (115/65 - 149/73)  BP(mean): --  RR: 14 (27 Jan 2020 06:40) (14 - 17)  SpO2: 100% (27 Jan 2020 06:40) (95% - 100%)    Physical Exam:  Gen: Alert, well-developed, NAD  HEENT: NCAT, EOMI, clear conjunctiva, no scleral icterus, no erythema or exudates in the oropharynx, mmm, R submandibular mass with tenderness to light palpation  Neck: Supple, no JVD, no LAD  CV: RRR, S1S2, no m/r/g  Resp: CTAB, normal respiratory effort  Abd: Soft, NT, ND, normal bowel sounds  Ext: no edema, no clubbing or cyanosis  Neuro: AOx3, CN2-12 grossly intact, JEAN  Skin: warm, perfused    LABS                        12.7   21.44 )-----------( 246      ( 26 Jan 2020 05:50 )             38.7                         15.2   13.75 )-----------( 323      ( 25 Jan 2020 18:01 )             44.9     01-26    143  |  112<H>  |  16  ----------------------------<  68<L>  3.6   |  16<L>  |  0.64  01-25    142  |  109<H>  |  18  ----------------------------<  107<H>  3.2<L>   |  23  |  0.67    Ca    7.8<L>      26 Jan 2020 05:50  Ca    8.8      25 Jan 2020 18:01  Phos  2.7     01-26  Mg     1.6     01-26    TPro  6.0  /  Alb  2.9<L>  /  TBili  0.7  /  DBili  x   /  AST  26  /  ALT  11  /  AlkPhos  46  01-26  TPro  8.4<H>  /  Alb  3.8  /  TBili  0.6  /  DBili  x   /  AST  30  /  ALT  23  /  AlkPhos  64  01-25                    RADIOLOGY & ADDITIONAL TESTS:    Imaging Personally Reviewed:    Consultant(s) Notes Reviewed:      Care Discussed with Consultants/Other Providers: Kalpesh Rivera, PGY-1  [phone]    Patient is a 74y old  Female who presents with a chief complaint of sialoadenitis (27 Jan 2020 05:46)      SUBJECTIVE/OVERNIGHT EVENTS: No AE ON. Patient seen and examined at bedside this AM.  Patient feeling well. Got list of her meds and gave to provider. Still endorses right sided neck pain but says it has improved dramatically. Denies fever, chills, sore throat, rhinorrhea, hearing changes, vision changes, HA, nausea, vomiting, cough, SOB, CP, abd pain, diarrhea, constipation, leg edema.     MEDICATIONS  (STANDING):  apixaban 5 milliGRAM(s) Oral two times a day  clindamycin IVPB      clindamycin IVPB 600 milliGRAM(s) IV Intermittent every 8 hours  dextrose 5%. 1000 milliLiter(s) (50 mL/Hr) IV Continuous <Continuous>  dextrose 50% Injectable 12.5 Gram(s) IV Push once  dextrose 50% Injectable 25 Gram(s) IV Push once  dextrose 50% Injectable 25 Gram(s) IV Push once  insulin lispro (HumaLOG) corrective regimen sliding scale   SubCutaneous three times a day before meals  insulin lispro (HumaLOG) corrective regimen sliding scale   SubCutaneous at bedtime  lactobacillus acidophilus 1 Tablet(s) Oral daily    MEDICATIONS  (PRN):  acetaminophen   Tablet .. 650 milliGRAM(s) Oral every 6 hours PRN Temp greater or equal to 38C (100.4F), Mild Pain (1 - 3)  dextrose 40% Gel 15 Gram(s) Oral once PRN Blood Glucose LESS THAN 70 milliGRAM(s)/deciliter  glucagon  Injectable 1 milliGRAM(s) IntraMuscular once PRN Glucose LESS THAN 70 milligrams/deciliter    CAPILLARY BLOOD GLUCOSE      POCT Blood Glucose.: 175 mg/dL (26 Jan 2020 22:01)  POCT Blood Glucose.: 161 mg/dL (26 Jan 2020 18:00)  POCT Blood Glucose.: 173 mg/dL (26 Jan 2020 12:42)  POCT Blood Glucose.: 79 mg/dL (26 Jan 2020 08:43)    I&O's Summary        Vital Signs Last 24 Hrs  T(C): 36.4 (27 Jan 2020 06:40), Max: 36.9 (26 Jan 2020 07:23)  T(F): 97.6 (27 Jan 2020 06:40), Max: 98.5 (26 Jan 2020 07:23)  HR: 71 (27 Jan 2020 06:40) (71 - 77)  BP: 149/73 (27 Jan 2020 06:40) (115/65 - 149/73)  BP(mean): --  RR: 14 (27 Jan 2020 06:40) (14 - 17)  SpO2: 100% (27 Jan 2020 06:40) (95% - 100%)    Physical Exam:  Gen: Alert, well-developed, NAD  HEENT: NCAT, EOMI, clear conjunctiva, no scleral icterus, no erythema or exudates in the oropharynx, mmm, R submandibular mass with tenderness to light palpation  Neck: Supple, no JVD, no LAD  CV: RRR, S1S2, no m/r/g  Resp: CTAB, normal respiratory effort  Abd: Soft, NT, ND, normal bowel sounds  Ext: no edema, no clubbing or cyanosis  Neuro: AOx3, CN2-12 grossly intact, JEAN  Skin: warm, perfused    LABS                        12.7   21.44 )-----------( 246      ( 26 Jan 2020 05:50 )             38.7                         15.2   13.75 )-----------( 323      ( 25 Jan 2020 18:01 )             44.9     01-26    143  |  112<H>  |  16  ----------------------------<  68<L>  3.6   |  16<L>  |  0.64  01-25    142  |  109<H>  |  18  ----------------------------<  107<H>  3.2<L>   |  23  |  0.67    Ca    7.8<L>      26 Jan 2020 05:50  Ca    8.8      25 Jan 2020 18:01  Phos  2.7     01-26  Mg     1.6     01-26    TPro  6.0  /  Alb  2.9<L>  /  TBili  0.7  /  DBili  x   /  AST  26  /  ALT  11  /  AlkPhos  46  01-26  TPro  8.4<H>  /  Alb  3.8  /  TBili  0.6  /  DBili  x   /  AST  30  /  ALT  23  /  AlkPhos  64  01-25                    RADIOLOGY & ADDITIONAL TESTS:    Imaging Personally Reviewed:    Consultant(s) Notes Reviewed:      Care Discussed with Consultants/Other Providers:

## 2020-01-27 NOTE — DISCHARGE NOTE PROVIDER - NSFOLLOWUPCLINICS_GEN_ALL_ED_FT
[facility] - ENT  Otolaryngology (ENT)  [address] [place] [address]  Phone: [phone]  Fax:   Follow Up Time: 1 week

## 2020-01-27 NOTE — PROGRESS NOTE ADULT - SUBJECTIVE AND OBJECTIVE BOX
Patient seen and examined, no acute events overnight. Feels like the pain is improving. AF.    T(C): 36.3 (01-26-20 @ 21:14), Max: 36.9 (01-26-20 @ 07:23)  HR: 73 (01-26-20 @ 21:14) (73 - 77)  BP: 143/87 (01-26-20 @ 21:14) (115/65 - 143/87)  RR: 17 (01-26-20 @ 21:14) (16 - 17)  SpO2: 100% (01-26-20 @ 21:14) (95% - 100%)  General: NAD, A+Ox3  No respiratory distress, stridor, or stertor  Nose: nasal cavity clear bilaterally, inferior turbinates normal, mucosa normal without crusting or bleeding  OC/OP: tongue normal, no purulence expressed from right sided dino's duct, mild ttp, no masses or lesions, OP clear  Neck: right neck with swelling and mild ttp  CNII-XII intact    CT neck 1/26: R SMG sialadenitis/sialolithiasis  Wounc Cx: GPC/GPR    A&P  74F with right  SMG sialolithiasis and sialadenitis, improving on IV abx.   - IV clindamycin  - Massage to right submandibular gland q4 hours for 15 minutes while awake  - Warm compresses to right submandibular gland q4 hours for 20 minutes while awake  - Sialagogues (lemon or sour candies) q4 hours while awake  - NSAIDs for pain if tolerated  - Aggressive PO hydration, consider IVF if unable to tolerate PO  - page or call with questions

## 2020-01-27 NOTE — PROGRESS NOTE ADULT - ASSESSMENT
75 y/o F w/ PMH HTN, DM2 (diet controlled), chronic systolic and diastolic HF (on unspecified diuretic), ITP, muscular dystrophy, polymyositis (on prednisone 5 QD), PE approx 5 years ago treated with eliquis, asthma (on albuterol PRN) presenting with acute worsening of several years of R submandibular swelling admitted for sepsis 2/2 right submandibular gland sialoadenitis a/w sialolithiasis up to 6 mm.

## 2020-01-27 NOTE — CONSULT NOTE ADULT - ATTENDING COMMENTS
Patient seen and examined, agree with above assessment and plan as transcribed above.    - no clinical CHF  - No need for further inpatient cardiac work up.      Prasanna Caceres MD, [facility]  BEEPER [phone]

## 2020-01-27 NOTE — DISCHARGE NOTE PROVIDER - NSDCMRMEDTOKEN_GEN_ALL_CORE_FT
apixaban 5 mg oral tablet: 1 tab(s) orally 2 times a day  fludrocortisone 0.1 mg oral tablet: 1 tab(s) orally once a day  furosemide 20 mg oral tablet: 1 tab(s) orally once a day  Pletal 50 mg oral tablet: 1 tab(s) orally 2 times a day  predniSONE 5 mg oral tablet: 1 tab(s) orally once a day  Toprol-XL 25 mg oral tablet, extended release: 1 tab(s) orally once a day apixaban 5 mg oral tablet: 1 tab(s) orally 2 times a day  clindamycin 300 mg oral capsule: 2 cap(s) orally 3 times a day   fludrocortisone 0.1 mg oral tablet: 1 tab(s) orally once a day  furosemide 20 mg oral tablet: 1 tab(s) orally once a day  lactobacillus acidophilus oral capsule: 1 cap(s) orally once a day   Pletal 50 mg oral tablet: 1 tab(s) orally 2 times a day  predniSONE 5 mg oral tablet: 1 tab(s) orally once a day  Toprol-XL 25 mg oral tablet, extended release: 1 tab(s) orally once a day apixaban 5 mg oral tablet: 1 tab(s) orally 2 times a day  clindamycin 300 mg oral capsule: 2 cap(s) orally 3 times a day   fludrocortisone 0.1 mg oral tablet: 1 tab(s) orally once a day  furosemide 20 mg oral tablet: 1 tab(s) orally once a day  lactobacillus acidophilus oral capsule: 1 cap(s) orally once a day   Pletal 50 mg oral tablet: 1 tab(s) orally 2 times a day  predniSONE 5 mg oral tablet: 1 tab(s) orally once a day  test strips (per patient&#x27;s insurance): 1 application subcutaneously 4 times a day. ** Compatible with patient&#x27;s glucometer **  Toprol-XL 25 mg oral tablet, extended release: 1 tab(s) orally once a day

## 2020-01-27 NOTE — PROGRESS NOTE ADULT - PROBLEM SELECTOR PLAN 6
Pt does not know what medications she takes,  will bring them in today  - monitor off antihypertensives as BP controlled and recent sepsis got med rec, updated

## 2020-01-27 NOTE — PROGRESS NOTE ADULT - PROBLEM SELECTOR PLAN 1
Right submandibular gland sialoadenitis related to sialolithiasis with 2 stones measuring 6 mm near the midline on CT neck. Pt evaluated by ENT. Per ENT note, white pus expressed from right dino's duct.  - f/u wound culture  - IV clindamycin 600 mg q8 hr  - s/p decadron  - reiterated to patient to massage right submandibular gland q4 hours for 15 minutes   - warm compresses to right submandibular gland q4 hours for 20 minutes    - sialagogues (lemon or sour candies) q4 hours while awake.  will bring some for her  - pt states she is tolerating po  - tylenol PRN

## 2020-01-27 NOTE — CONSULT NOTE ADULT - SUBJECTIVE AND OBJECTIVE BOX
HISTORY OF PRESENT ILLNESS: HPI:    Pt is a 75 y/o F w/ PMHx non obs CAD on last cath 1/2016, hx NICM which resolved, last NST 4/2019 with no ischemia or infarct, EF 75%, DVT/PE on AC, anemia, asthma, cholelithiasis, diverticulosis, gastritis, hiatal hernia, DM2, HTN, ITP, muscular dystrophy, polymyositis, presenting with swelling of the R side of her neck for 2 days with difficulty swallowing and associated fevers.  SHe was Trx to [facility] for ENT eval.  She is found with right sialolithiasis (x2 stones) and right submandibular gland sialoadenitis and started on IV antibiotics and steroids.    She denies CP or SOB.      PAST MEDICAL & SURGICAL HISTORY:  Muscular dystrophy  Hiatal hernia  Renal cyst, left  Cholelithiasis  Diverticulosis  Anemia  Polymyositis: on Prednisone  Pulmonary embolism: in 11/2015, on Eliquis  Compression fracture  Gastritis  Type 2 diabetes mellitus without complication  ITP (idiopathic thrombocytopenic purpura)  Pneumonia  History of hypertension  Asthma  H/O splenectomy      MEDICATIONS  (STANDING):  apixaban 5 milliGRAM(s) Oral two times a day  cilostazol 50 milliGRAM(s) Oral two times a day      clindamycin IVPB 600 milliGRAM(s) IV Intermittent every 8 hours  dextrose 5%. 1000 milliLiter(s) (50 mL/Hr) IV Continuous <Continuous>  dextrose 50% Injectable 12.5 Gram(s) IV Push once  dextrose 50% Injectable 25 Gram(s) IV Push once  dextrose 50% Injectable 25 Gram(s) IV Push once  fludroCORTISONE 0.1 milliGRAM(s) Oral daily  furosemide    Tablet 20 milliGRAM(s) Oral daily  insulin lispro (HumaLOG) corrective regimen sliding scale   SubCutaneous three times a day before meals  insulin lispro (HumaLOG) corrective regimen sliding scale   SubCutaneous at bedtime  lactobacillus acidophilus 1 Tablet(s) Oral daily  metoprolol succinate ER 25 milliGRAM(s) Oral daily  predniSONE   Tablet 5 milliGRAM(s) Oral daily      Allergies  Keflex (Rash)    FAMILY HISTORY:  Family history of diabetes mellitus  Noncontributory for premature coronary disease or sudden cardiac death    SOCIAL HISTORY:    [x ] Non-smoker  [ ] Smoker  [ ] Alcohol    FLU VACCINE THIS YEAR STARTS IN AUGUST:  [ x] Yes    [ ] No    IF OVER 65 HAVE YOU EVER HAD A PNA VACCINE:  [ ] Yes    [ ] No       [ ] N/A      REVIEW OF SYSTEMS:  [ ]chest pain  [  ]shortness of breath  [  ]palpitations  [  ]syncope  [ ]near syncope [ ]upper extremity weakness   [ ] lower extremity weakness  [  ]diplopia  [  ]altered mental status   [  ]fevers  [ ]chills [ ]nausea  [ ]vomitting  [  ]dysphagia    [ ]abdominal pain  [ ]melena  [ ]BRBPR    [  ]epistaxis  [  ]rash    [ ]lower extremity edema        [x ] All others negative	  [ ] Unable to obtain      LABS:	 	                     13.4   17.84 )-----------( 253      ( 27 Jan 2020 07:19 )             39.1     139  |  110<H>  |  20  ----------------------------<  111<H>  3.4<L>   |  17<L>  |  0.61    Ca    8.9      27 Jan 2020 07:19  Phos  2.5     01-27  Mg     1.9     01-27    TPro  6.0  /  Alb  2.9<L>  /  TBili  0.7  /  DBili  x   /  AST  26  /  ALT  11  /  AlkPhos  46  01-26    PHYSICAL EXAM:  T(C): 36.4 (01-27-20 @ 06:40), Max: 36.4 (01-27-20 @ 06:40)  HR: 71 (01-27-20 @ 06:40) (71 - 73)  BP: 149/73 (01-27-20 @ 06:40) (143/87 - 149/73)  RR: 14 (01-27-20 @ 06:40) (14 - 17)  SpO2: 100% (01-27-20 @ 06:40) (100% - 100%)  Wt(kg): --   BMI (kg/m2): 14 (01-27-20 @ 06:40)    Gen: Appears well in NAD  HEENT:  (-)icterus (-)pallor  CV: N S1 S2 1/6 SIMONE (+)2 Pulses B/l  Resp:  Clear to ausculatation B/L, normal effort  GI: (+) BS Soft, NT, ND  Lymph:  (-)Edema, (-)obvious lymphadenopathy  Skin: Warm to touch, Normal turgor  Psych: Appropriate mood and affect      ECG:  n/a	    < from: CT Neck Soft Tissue w/ IV Cont (01.25.20 @ 19:42) >  IMPRESSION:    Right submandibular gland sialoadenitis related to sialolithiasis with 2 stones measuring 6 mm near the midline. No focal collection.    < end of copied text >      ASSESSMENT/PLAN: 	75 y/o F w/ PMHx non obs CAD on last cath 1/2016, hx NICM which resolved, last NST 4/2019 with no ischemia or infarct, EF 75%, DVT/PE on AC, anemia, asthma, cholelithiasis, diverticulosis, gastritis, hiatal hernia, DM2, HTN, ITP, muscular dystrophy, polymyositis, presenting with swelling of the R side of her neck for 2 days with difficulty swallowing and associated fevers and found with right sialolithiasis (x2 stones) and right submandibular gland sialoadenitis     --trx per ENT  --not in clinical CHF  --Recent NST in my office as noted above  --last TTE 4/2019 with Normal LV/Rv  function  --if procedures are planned, check 12 lead EKG  --f/u with Dr Craft/Dr Mae after DC as scheduled    Thank you for allowing us to participate in the care of our mutual patient.  Please do not hesitate to call with any questions.

## 2020-01-27 NOTE — PROGRESS NOTE ADULT - PROBLEM SELECTOR PLAN 2
TTE 1/2016: EF 25%, severe global LVSD, grade IV diastolic dysfunction, mod pHTN  - appears euvolemic on exam  - med rec today TTE 1/2016: EF 25%, severe global LVSD, grade IV diastolic dysfunction, mod pHTN  - appears euvolemic on exam  -On metoprolol 25mg qd and Lasix 20mg qd

## 2020-01-27 NOTE — DISCHARGE NOTE PROVIDER - NSDCCPCAREPLAN_GEN_ALL_CORE_FT
PRINCIPAL DISCHARGE DIAGNOSIS  Diagnosis: Sialoadenitis  Assessment and Plan of Treatment: You came in due to right sided facial pain and was diagnosed with sialoadenitis. This is an inflammation of the salivary glands. Many times this can be caused by a virus or bacteria. We started you on antibiotics and you did much better. Your pain decreased.   Please take your antibiotics as prescribed. Please continue to put warm compressed over the right side of your face and to massage it every 20 minuts while you are awake. You can also try sucking on a lemon or sour candy to help pass the stone that is in the gland which can help improve the pain.   Please follow up with ENT within one week.

## 2020-01-27 NOTE — DISCHARGE NOTE PROVIDER - HOSPITAL COURSE
73 y/o F w/ PMH HTN, DM2 (diet controlled), chronic systolic and diastolic HF (on unspecified diuretic), ITP, muscular dystrophy, polymyositis (on prednisone 5 QD), PE approx 5 years ago treated with eliquis, asthma (on albuterol PRN) presenting with acute worsening of several years of R submandibular swelling admitted for sepsis 2/2 right submandibular gland sialoadenitis a/w sialolithiasis up to 6 mm. Patient remained afebrile throughout admission and continued to improve. Patient able to tolerate PO diet. Patient to continue antibiotics as outpatient and to follow up with ENT who saw her as an inpatient.         PT saw patient and recommended home with home PT    Patient safe, stable, and ready for discharge. 75 y/o F w/ PMH HTN, DM2 (diet controlled), chronic systolic and diastolic HF (on unspecified diuretic), ITP, muscular dystrophy, polymyositis (on prednisone 5 QD), PE approx 5 years ago treated with eliquis, asthma (on albuterol PRN) presenting with acute worsening of several years of R submandibular swelling admitted for sepsis 2/2 right submandibular gland sialoadenitis a/w sialolithiasis up to 6 mm. Patient remained afebrile throughout admission and continued to improve. Patient able to tolerate PO diet. Patient to continue antibiotics as outpatient and to follow up with ENT who saw her as an inpatient.         PT saw patient and recommended home with home PT services.     Patient safe, stable, and ready for discharge.

## 2020-01-27 NOTE — PROGRESS NOTE ADULT - PROBLEM SELECTOR PLAN 4
Pt states she is still taking Eliquis after starting it approx 5 years ago for PE and reports being told to stay on it  - will c/w Eliquis 5 mg bid for now  - clarify with PCP on Monday indication, patient is unsure if she has a history of irregular heart rhythm Pt states she is still taking Eliquis after starting it approx 5 years ago for PE and reports being told to stay on it  - c/w Eliquis 5 mg bid for now

## 2020-01-27 NOTE — PROGRESS NOTE ADULT - PROBLEM SELECTOR PLAN 7
Pt's  will bring her medications to the hospital in am, unable to be reached over the phone. Pt's pharmacy is [facility] on [address] in [place] and it opens on Monday updated

## 2020-01-27 NOTE — PHYSICAL THERAPY INITIAL EVALUATION ADULT - PERTINENT HX OF CURRENT PROBLEM, REHAB EVAL
patient presents with sialoadenitis. PMH includes anemia, asthma, cholelithiasis, diverticulosis, gastritis, hiatal hernia, DM2, HTN, ITP, muscular dystrophy, polymyositis, PE

## 2020-01-27 NOTE — PROGRESS NOTE ADULT - PROBLEM SELECTOR PLAN 5
Pt denies having DM2 and denies taking any antihyperglycemics  - A1C 5.9  - will place on ISS for now while on decadron Pt denies having DM2 and denies taking any antihyperglycemics  - A1C 5.9  - ISS

## 2020-01-28 ENCOUNTER — TRANSCRIPTION ENCOUNTER (OUTPATIENT)
Age: 75
End: 2020-01-28

## 2020-01-28 VITALS
TEMPERATURE: 98 F | RESPIRATION RATE: 18 BRPM | HEART RATE: 70 BPM | DIASTOLIC BLOOD PRESSURE: 86 MMHG | OXYGEN SATURATION: 100 % | SYSTOLIC BLOOD PRESSURE: 156 MMHG

## 2020-01-28 LAB
-  AMIKACIN: SIGNIFICANT CHANGE UP
-  AMPICILLIN/SULBACTAM: SIGNIFICANT CHANGE UP
-  AMPICILLIN: SIGNIFICANT CHANGE UP
-  AZTREONAM: SIGNIFICANT CHANGE UP
-  CEFAZOLIN: SIGNIFICANT CHANGE UP
-  CEFEPIME: SIGNIFICANT CHANGE UP
-  CEFOXITIN: SIGNIFICANT CHANGE UP
-  CEFTAZIDIME: SIGNIFICANT CHANGE UP
-  CEFTRIAXONE: SIGNIFICANT CHANGE UP
-  ERTAPENEM: SIGNIFICANT CHANGE UP
-  GENTAMICIN: SIGNIFICANT CHANGE UP
-  IMIPENEM: SIGNIFICANT CHANGE UP
-  LEVOFLOXACIN: SIGNIFICANT CHANGE UP
-  MEROPENEM: SIGNIFICANT CHANGE UP
-  PIPERACILLIN/TAZOBACTAM: SIGNIFICANT CHANGE UP
-  TIGECYCLINE: SIGNIFICANT CHANGE UP
-  TOBRAMYCIN: SIGNIFICANT CHANGE UP
-  TRIMETHOPRIM/SULFAMETHOXAZOLE: SIGNIFICANT CHANGE UP
ANION GAP SERPL CALC-SCNC: 11 MMO/L — SIGNIFICANT CHANGE UP (ref 7–14)
BASOPHILS # BLD AUTO: 0.01 K/UL — SIGNIFICANT CHANGE UP (ref 0–0.2)
BASOPHILS NFR BLD AUTO: 0.1 % — SIGNIFICANT CHANGE UP (ref 0–2)
BUN SERPL-MCNC: 13 MG/DL — SIGNIFICANT CHANGE UP (ref 7–23)
CALCIUM SERPL-MCNC: 8.4 MG/DL — SIGNIFICANT CHANGE UP (ref 8.4–10.5)
CHLORIDE SERPL-SCNC: 108 MMOL/L — HIGH (ref 98–107)
CO2 SERPL-SCNC: 22 MMOL/L — SIGNIFICANT CHANGE UP (ref 22–31)
CREAT SERPL-MCNC: 0.55 MG/DL — SIGNIFICANT CHANGE UP (ref 0.5–1.3)
CULTURE RESULTS: SIGNIFICANT CHANGE UP
EOSINOPHIL # BLD AUTO: 0.01 K/UL — SIGNIFICANT CHANGE UP (ref 0–0.5)
EOSINOPHIL NFR BLD AUTO: 0.1 % — SIGNIFICANT CHANGE UP (ref 0–6)
GLUCOSE BLDC GLUCOMTR-MCNC: 186 MG/DL — HIGH (ref 70–99)
GLUCOSE BLDC GLUCOMTR-MCNC: 97 MG/DL — SIGNIFICANT CHANGE UP (ref 70–99)
GLUCOSE SERPL-MCNC: 95 MG/DL — SIGNIFICANT CHANGE UP (ref 70–99)
GRAM STN SPEC: SIGNIFICANT CHANGE UP
HCT VFR BLD CALC: 39.5 % — SIGNIFICANT CHANGE UP (ref 34.5–45)
HGB BLD-MCNC: 13.3 G/DL — SIGNIFICANT CHANGE UP (ref 11.5–15.5)
IMM GRANULOCYTES NFR BLD AUTO: 0.5 % — SIGNIFICANT CHANGE UP (ref 0–1.5)
LYMPHOCYTES # BLD AUTO: 1.88 K/UL — SIGNIFICANT CHANGE UP (ref 1–3.3)
LYMPHOCYTES # BLD AUTO: 12 % — LOW (ref 13–44)
MAGNESIUM SERPL-MCNC: 1.7 MG/DL — SIGNIFICANT CHANGE UP (ref 1.6–2.6)
MCHC RBC-ENTMCNC: 30.3 PG — SIGNIFICANT CHANGE UP (ref 27–34)
MCHC RBC-ENTMCNC: 33.7 % — SIGNIFICANT CHANGE UP (ref 32–36)
MCV RBC AUTO: 90 FL — SIGNIFICANT CHANGE UP (ref 80–100)
METHOD TYPE: SIGNIFICANT CHANGE UP
MONOCYTES # BLD AUTO: 1.58 K/UL — HIGH (ref 0–0.9)
MONOCYTES NFR BLD AUTO: 10.1 % — SIGNIFICANT CHANGE UP (ref 2–14)
NEUTROPHILS # BLD AUTO: 12.12 K/UL — HIGH (ref 1.8–7.4)
NEUTROPHILS NFR BLD AUTO: 77.2 % — HIGH (ref 43–77)
NRBC # FLD: 0 K/UL — SIGNIFICANT CHANGE UP (ref 0–0)
ORGANISM # SPEC MICROSCOPIC CNT: SIGNIFICANT CHANGE UP
PHOSPHATE SERPL-MCNC: 2 MG/DL — LOW (ref 2.5–4.5)
PLATELET # BLD AUTO: 259 K/UL — SIGNIFICANT CHANGE UP (ref 150–400)
PMV BLD: 10.9 FL — SIGNIFICANT CHANGE UP (ref 7–13)
POTASSIUM SERPL-MCNC: 3.5 MMOL/L — SIGNIFICANT CHANGE UP (ref 3.5–5.3)
POTASSIUM SERPL-SCNC: 3.5 MMOL/L — SIGNIFICANT CHANGE UP (ref 3.5–5.3)
RBC # BLD: 4.39 M/UL — SIGNIFICANT CHANGE UP (ref 3.8–5.2)
RBC # FLD: 15.5 % — HIGH (ref 10.3–14.5)
SODIUM SERPL-SCNC: 141 MMOL/L — SIGNIFICANT CHANGE UP (ref 135–145)
WBC # BLD: 15.68 K/UL — HIGH (ref 3.8–10.5)
WBC # FLD AUTO: 15.68 K/UL — HIGH (ref 3.8–10.5)

## 2020-01-28 PROCEDURE — 99239 HOSP IP/OBS DSCHRG MGMT >30: CPT | Mod: GC

## 2020-01-28 RX ORDER — POTASSIUM PHOSPHATE, MONOBASIC POTASSIUM PHOSPHATE, DIBASIC 236; 224 MG/ML; MG/ML
15 INJECTION, SOLUTION INTRAVENOUS ONCE
Refills: 0 | Status: COMPLETED | OUTPATIENT
Start: 2020-01-28 | End: 2020-01-28

## 2020-01-28 RX ORDER — LACTOBACILLUS ACIDOPHILUS 100MM CELL
1 CAPSULE ORAL
Qty: 7 | Refills: 0
Start: 2020-01-28 | End: 2020-02-03

## 2020-01-28 RX ADMIN — Medication 1: at 13:00

## 2020-01-28 RX ADMIN — Medication 5 MILLIGRAM(S): at 05:11

## 2020-01-28 RX ADMIN — Medication 20 MILLIGRAM(S): at 05:11

## 2020-01-28 RX ADMIN — APIXABAN 5 MILLIGRAM(S): 2.5 TABLET, FILM COATED ORAL at 05:11

## 2020-01-28 RX ADMIN — Medication 600 MILLIGRAM(S): at 14:13

## 2020-01-28 RX ADMIN — CILOSTAZOL 50 MILLIGRAM(S): 100 TABLET ORAL at 05:11

## 2020-01-28 RX ADMIN — Medication 100 MILLIGRAM(S): at 05:12

## 2020-01-28 RX ADMIN — Medication 25 MILLIGRAM(S): at 05:11

## 2020-01-28 RX ADMIN — Medication 1 TABLET(S): at 12:25

## 2020-01-28 RX ADMIN — POTASSIUM PHOSPHATE, MONOBASIC POTASSIUM PHOSPHATE, DIBASIC 62.5 MILLIMOLE(S): 236; 224 INJECTION, SOLUTION INTRAVENOUS at 12:25

## 2020-01-28 NOTE — DISCHARGE NOTE NURSING/CASE MANAGEMENT/SOCIAL WORK - PATIENT PORTAL LINK FT
You can access the FollowMyHealth Patient Portal offered by [facility] by registering at the following website: [email or web address]. By joining [employer]’s FollowMyHealth portal, you will also be able to view your health information using other applications (apps) compatible with our system.

## 2020-01-28 NOTE — PROGRESS NOTE ADULT - SUBJECTIVE AND OBJECTIVE BOX
Patient denies chest pain or shortness of breath.   Review of systems otherwise (-)  	    MEDICATIONS  (STANDING):  apixaban 5 milliGRAM(s) Oral two times a day  cilostazol 50 milliGRAM(s) Oral two times a day  clindamycin   Capsule 600 milliGRAM(s) Oral every 8 hours  dextrose 5%. 1000 milliLiter(s) (50 mL/Hr) IV Continuous <Continuous>  dextrose 50% Injectable 12.5 Gram(s) IV Push once  dextrose 50% Injectable 25 Gram(s) IV Push once  dextrose 50% Injectable 25 Gram(s) IV Push once  fludroCORTISONE 0.1 milliGRAM(s) Oral daily  furosemide    Tablet 20 milliGRAM(s) Oral daily  insulin lispro (HumaLOG) corrective regimen sliding scale   SubCutaneous three times a day before meals  insulin lispro (HumaLOG) corrective regimen sliding scale   SubCutaneous at bedtime  lactobacillus acidophilus 1 Tablet(s) Oral daily  metoprolol succinate ER 25 milliGRAM(s) Oral daily  predniSONE   Tablet 5 milliGRAM(s) Oral daily      LABS:	 	                          13.3   15.68 )-----------( 259      ( 28 Jan 2020 05:58 )             39.5     Hemoglobin: 13.3 g/dL (01-28 @ 05:58)  Hemoglobin: 13.4 g/dL (01-27 @ 07:19)  Hemoglobin: 12.7 g/dL (01-26 @ 05:50)  Hemoglobin: 15.2 g/dL (01-25 @ 18:01)    01-28    141  |  108<H>  |  13  ----------------------------<  95  3.5   |  22  |  0.55    Ca    8.4      28 Jan 2020 05:58  Phos  2.0     01-28  Mg     1.7     01-28      Creatinine Trend: 0.55<--, 0.61<--, 0.64<--, 0.67<--  COAGS:       proBNP:   Lipid Profile:   HgA1c:   TSH:     PHYSICAL EXAM:  T(C): 36.8 (01-28-20 @ 04:58), Max: 36.9 (01-27-20 @ 15:51)  HR: 70 (01-28-20 @ 04:58) (70 - 87)  BP: 156/86 (01-28-20 @ 04:58) (145/81 - 156/86)  RR: 18 (01-28-20 @ 04:58) (18 - 18)  SpO2: 100% (01-28-20 @ 04:58) (100% - 100%)  Wt(kg): --  I&O's Summary    27 Jan 2020 07:01  -  28 Jan 2020 07:00  --------------------------------------------------------  IN: 660 mL / OUT: 0 mL / NET: 660 mL    28 Jan 2020 07:01  -  28 Jan 2020 15:27  --------------------------------------------------------  IN: 240 mL / OUT: 0 mL / NET: 240 mL          Gen: Appears well in NAD  HEENT:  (-)icterus (-)pallor  CV: N S1 S2 1/6 SIMONE (+)2 Pulses B/l  Resp:  Clear to ausculatation B/L, normal effort  GI: (+) BS Soft, NT, ND  Lymph:  (-)Edema, (-)obvious lymphadenopathy  Skin: Warm to touch, Normal turgor  Psych: Appropriate mood and affect      TELEMETRY: 	  not on tele         ASSESSMENT/PLAN: 	5 y/o F w/ PMHx non obs CAD on last cath 1/2016, hx NICM which resolved, last NST 4/2019 with no ischemia or infarct, EF 75%, DVT/PE on AC, anemia, asthma, cholelithiasis, diverticulosis, gastritis, hiatal hernia, DM2, HTN, ITP, muscular dystrophy, polymyositis, presenting with swelling of the R side of her neck for 2 days with difficulty swallowing and associated fevers and found with right sialolithiasis (x2 stones) and right submandibular gland sialoadenitis     --trx per ENT  --not in clinical CHF  --Recent NST in my office as noted above  --last TTE 4/2019 with Normal LV/Rv  function  --if procedures are planned, check 12 lead EKG  --f/u with Dr Craft/Dr Mae after DC as scheduled

## 2020-01-28 NOTE — DISCHARGE NOTE NURSING/CASE MANAGEMENT/SOCIAL WORK - NSDCDMETYPESERV_GEN_ALL_CORE_FT
A representative will be in contact to arrange for delivery of the shower chair and raised toilet seat.

## 2020-01-28 NOTE — PROGRESS NOTE ADULT - PROBLEM SELECTOR PLAN 2
TTE 1/2016: EF 25%, severe global LVSD, grade IV diastolic dysfunction, mod pHTN  - appears euvolemic on exam  -On metoprolol 25mg qd and Lasix 20mg qd

## 2020-01-28 NOTE — PROGRESS NOTE ADULT - SUBJECTIVE AND OBJECTIVE BOX
Patient seen and examined at bedside. No acute events overnight. Doing warm compresses and massages. States pain mildly improved. Remains afebrile. On PO clinda.     T(C): 36.8 (01-28-20 @ 04:58), Max: 36.9 (01-27-20 @ 15:51)  HR: 70 (01-28-20 @ 04:58) (70 - 87)  BP: 156/86 (01-28-20 @ 04:58) (145/81 - 156/86)  RR: 18 (01-28-20 @ 04:58) (18 - 18)  SpO2: 100% (01-28-20 @ 04:58) (100% - 100%)    General: NAD, A+Ox3  No respiratory distress, stridor, or stertor  Nose: nasal cavity clear bilaterally, inferior turbinates normal, mucosa normal without crusting or bleeding  OC/OP: tongue normal, minimal purulence expressed from right sided dino's duct, mild ttp, no masses or lesions, OP clear  Neck: right neck with swelling and mild ttp  CNII-XII intact    CT neck 1/26: R SMG sialadenitis/sialolithiasis  Wounc Cx: GPC/GPR    A&P  74F with right  SMG sialolithiasis and sialadenitis, improving on IV abx.   - clindamycin  - Massage to right submandibular gland q4 hours for 15 minutes while awake  - Warm compresses to right submandibular gland q4 hours for 20 minutes while awake  - Sialagogues (lemon or sour candies) q4 hours while awake  - NSAIDs for pain if tolerated  - Aggressive PO hydration, consider IVF if unable to tolerate PO  - page or call with questions

## 2020-01-28 NOTE — DISCHARGE NOTE NURSING/CASE MANAGEMENT/SOCIAL WORK - NSSCNAMETXT_GEN_ALL_CORE
[facility]. Nurse to visit on the day following discharge. Other appropriate services to be arranged thereafter.   Please contact the home care agency at the above phone number if you have not heard from them by approximately 12 noon on the day after your hospital discharge.

## 2020-01-28 NOTE — PROGRESS NOTE ADULT - PROBLEM SELECTOR PLAN 4
Pt states she is still taking Eliquis after starting it approx 5 years ago for PE and reports being told to stay on it  - c/w Eliquis 5 mg bid for now Pt states she is still taking Eliquis after starting it approx 5 years ago for PE and reports being told to stay on it  - c/w Eliquis 5 mg bid for now  -will have to f/u as outpt with her PCP

## 2020-01-28 NOTE — PROGRESS NOTE ADULT - ASSESSMENT
73 y/o F w/ PMH HTN, DM2 (diet controlled), chronic systolic and diastolic HF (on unspecified diuretic), ITP, muscular dystrophy, polymyositis (on prednisone 5 QD), PE approx 5 years ago treated with eliquis, asthma (on albuterol PRN) presenting with acute worsening of several years of R submandibular swelling admitted for sepsis 2/2 right submandibular gland sialoadenitis a/w sialolithiasis up to 6 mm. 75 y/o F w/ PMH HTN, DM2 (diet controlled), chronic systolic and diastolic HF (on unspecified diuretic), ITP, muscular dystrophy, polymyositis (on prednisone 5 QD), PE approx 5 years ago treated with eliquis, asthma (on albuterol PRN) presenting with acute worsening of several years of R submandibular swelling admitted for sepsis 2/2 right submandibular gland sialoadenitis a/w sialolithiasis up to 6 mm, now improved.

## 2020-01-28 NOTE — PROGRESS NOTE ADULT - ATTENDING COMMENTS
Patient seen and examined this am with Makayla.  Agree with above.   No further inpatient cardiac workup needed at this time.     Sully Henriquez MD
Improved symptoms today. Plan d/c home to complete 7 additional days of po clindamycin (will rx probiotic). ENT f/u. Time planning discharge 35 minutes.
Pt notes some improvement in jaw swelling and oral pain. Tolerating diet. Continue clindamycin, will provide sialogogues, and f/u PT consult.
Sepsis d/t sialoadenitis, c/w IV Clinda, f/up wound culture, Decadron x 24hrs, warm compresses, ENT f/up  Chronic pulmonary embolism, c/w Eliquis   Polymyositis, on Decadron today for sialoadenitis and will restart home Prednisone 5mg tomorrow  Chronic combined systolic and diastolic heart failure, monitor volume status, clarify home meds

## 2020-01-28 NOTE — PROGRESS NOTE ADULT - PROBLEM SELECTOR PLAN 1
Right submandibular gland sialoadenitis related to sialolithiasis with 2 stones measuring 6 mm near the midline on CT neck. Pt evaluated by ENT. Per ENT note, white pus expressed from right dino's duct.  - f/u wound culture  - IV clindamycin 600 mg q8 hr  - s/p decadron  - reiterated to patient to massage right submandibular gland q4 hours for 15 minutes   - warm compresses to right submandibular gland q4 hours for 20 minutes    - sialagogues (lemon or sour candies) q4 hours while awake.  will bring some for her  - pt states she is tolerating po  - tylenol PRN Right submandibular gland sialoadenitis related to sialolithiasis with 2 stones measuring 6 mm near the midline on CT neck. Pt evaluated by ENT. Per ENT note, white pus expressed from right dino's duct.  - Cx NGTD  - PO clindamycin 600 mg q8 hr  - s/p decadron  - reiterated to patient to massage right submandibular gland q4 hours for 15 minutes   - warm compresses to right submandibular gland q4 hours for 20 minutes    - sialagogues (lemon or sour candies) q4 hours while awake.   - pt states she is tolerating po  - tylenol PRN

## 2020-01-28 NOTE — PROGRESS NOTE ADULT - SUBJECTIVE AND OBJECTIVE BOX
Kalpesh Rivera, PGY-1  [phone]    Patient is a 74y old  Female who presents with a chief complaint of sialoadenitis (27 Jan 2020 16:20)      SUBJECTIVE/OVERNIGHT EVENTS: No AE ON. Patient seen and examined at bedside this AM.      MEDICATIONS  (STANDING):  apixaban 5 milliGRAM(s) Oral two times a day  cilostazol 50 milliGRAM(s) Oral two times a day  clindamycin IVPB      clindamycin IVPB 600 milliGRAM(s) IV Intermittent every 8 hours  dextrose 5%. 1000 milliLiter(s) (50 mL/Hr) IV Continuous <Continuous>  dextrose 50% Injectable 12.5 Gram(s) IV Push once  dextrose 50% Injectable 25 Gram(s) IV Push once  dextrose 50% Injectable 25 Gram(s) IV Push once  fludroCORTISONE 0.1 milliGRAM(s) Oral daily  furosemide    Tablet 20 milliGRAM(s) Oral daily  insulin lispro (HumaLOG) corrective regimen sliding scale   SubCutaneous three times a day before meals  insulin lispro (HumaLOG) corrective regimen sliding scale   SubCutaneous at bedtime  lactobacillus acidophilus 1 Tablet(s) Oral daily  metoprolol succinate ER 25 milliGRAM(s) Oral daily  potassium phosphate IVPB 15 milliMole(s) IV Intermittent once  predniSONE   Tablet 5 milliGRAM(s) Oral daily    MEDICATIONS  (PRN):  acetaminophen   Tablet .. 650 milliGRAM(s) Oral every 6 hours PRN Temp greater or equal to 38C (100.4F), Mild Pain (1 - 3)  dextrose 40% Gel 15 Gram(s) Oral once PRN Blood Glucose LESS THAN 70 milliGRAM(s)/deciliter  glucagon  Injectable 1 milliGRAM(s) IntraMuscular once PRN Glucose LESS THAN 70 milligrams/deciliter    CAPILLARY BLOOD GLUCOSE      POCT Blood Glucose.: 118 mg/dL (27 Jan 2020 22:15)  POCT Blood Glucose.: 124 mg/dL (27 Jan 2020 18:04)  POCT Blood Glucose.: 93 mg/dL (27 Jan 2020 12:46)  POCT Blood Glucose.: 86 mg/dL (27 Jan 2020 08:54)    I&O's Summary    27 Jan 2020 07:01  -  28 Jan 2020 07:00  --------------------------------------------------------  IN: 660 mL / OUT: 0 mL / NET: 660 mL          Vital Signs Last 24 Hrs  T(C): 36.8 (28 Jan 2020 04:58), Max: 36.9 (27 Jan 2020 15:51)  T(F): 98.3 (28 Jan 2020 04:58), Max: 98.5 (27 Jan 2020 15:51)  HR: 70 (28 Jan 2020 04:58) (70 - 87)  BP: 156/86 (28 Jan 2020 04:58) (145/81 - 156/86)  BP(mean): --  RR: 18 (28 Jan 2020 04:58) (18 - 18)  SpO2: 100% (28 Jan 2020 04:58) (100% - 100%)    Physical Exam:  Gen: Alert, well-developed, NAD  HEENT: NCAT, EOMI, clear conjunctiva, no scleral icterus, no erythema or exudates in the oropharynx, mmm, R submandibular mass with tenderness to light palpation  Neck: Supple, no JVD, no LAD  CV: RRR, S1S2, no m/r/g  Resp: CTAB, normal respiratory effort  Abd: Soft, NT, ND, normal bowel sounds  Ext: no edema, no clubbing or cyanosis  Neuro: AOx3, CN2-12 grossly intact, JEAN  Skin: warm, perfused    LABS                        13.3   15.68 )-----------( 259      ( 28 Jan 2020 05:58 )             39.5                         13.4   17.84 )-----------( 253      ( 27 Jan 2020 07:19 )             39.1     01-28    141  |  108<H>  |  13  ----------------------------<  95  3.5   |  22  |  0.55  01-27    139  |  110<H>  |  20  ----------------------------<  111<H>  3.4<L>   |  17<L>  |  0.61    Ca    8.4      28 Jan 2020 05:58  Ca    8.9      27 Jan 2020 07:19  Phos  2.0     01-28  Mg     1.7     01-28                      RADIOLOGY & ADDITIONAL TESTS:    Imaging Personally Reviewed:    Consultant(s) Notes Reviewed:      Care Discussed with Consultants/Other Providers: Kalpesh Rivera, PGY-1  [phone]    Patient is a 74y old  Female who presents with a chief complaint of sialoadenitis (27 Jan 2020 16:20)      SUBJECTIVE/OVERNIGHT EVENTS: No AE ON. Patient seen and examined at bedside this AM.  Patient feeling much improved. Pain much better. Patient denies any sx. Denies f/c/n/v/d/c, CP, SOB, abd pain, cough. She would like to go home today.     MEDICATIONS  (STANDING):  apixaban 5 milliGRAM(s) Oral two times a day  cilostazol 50 milliGRAM(s) Oral two times a day  clindamycin IVPB      clindamycin IVPB 600 milliGRAM(s) IV Intermittent every 8 hours  dextrose 5%. 1000 milliLiter(s) (50 mL/Hr) IV Continuous <Continuous>  dextrose 50% Injectable 12.5 Gram(s) IV Push once  dextrose 50% Injectable 25 Gram(s) IV Push once  dextrose 50% Injectable 25 Gram(s) IV Push once  fludroCORTISONE 0.1 milliGRAM(s) Oral daily  furosemide    Tablet 20 milliGRAM(s) Oral daily  insulin lispro (HumaLOG) corrective regimen sliding scale   SubCutaneous three times a day before meals  insulin lispro (HumaLOG) corrective regimen sliding scale   SubCutaneous at bedtime  lactobacillus acidophilus 1 Tablet(s) Oral daily  metoprolol succinate ER 25 milliGRAM(s) Oral daily  potassium phosphate IVPB 15 milliMole(s) IV Intermittent once  predniSONE   Tablet 5 milliGRAM(s) Oral daily    MEDICATIONS  (PRN):  acetaminophen   Tablet .. 650 milliGRAM(s) Oral every 6 hours PRN Temp greater or equal to 38C (100.4F), Mild Pain (1 - 3)  dextrose 40% Gel 15 Gram(s) Oral once PRN Blood Glucose LESS THAN 70 milliGRAM(s)/deciliter  glucagon  Injectable 1 milliGRAM(s) IntraMuscular once PRN Glucose LESS THAN 70 milligrams/deciliter    CAPILLARY BLOOD GLUCOSE      POCT Blood Glucose.: 118 mg/dL (27 Jan 2020 22:15)  POCT Blood Glucose.: 124 mg/dL (27 Jan 2020 18:04)  POCT Blood Glucose.: 93 mg/dL (27 Jan 2020 12:46)  POCT Blood Glucose.: 86 mg/dL (27 Jan 2020 08:54)    I&O's Summary    27 Jan 2020 07:01  -  28 Jan 2020 07:00  --------------------------------------------------------  IN: 660 mL / OUT: 0 mL / NET: 660 mL          Vital Signs Last 24 Hrs  T(C): 36.8 (28 Jan 2020 04:58), Max: 36.9 (27 Jan 2020 15:51)  T(F): 98.3 (28 Jan 2020 04:58), Max: 98.5 (27 Jan 2020 15:51)  HR: 70 (28 Jan 2020 04:58) (70 - 87)  BP: 156/86 (28 Jan 2020 04:58) (145/81 - 156/86)  BP(mean): --  RR: 18 (28 Jan 2020 04:58) (18 - 18)  SpO2: 100% (28 Jan 2020 04:58) (100% - 100%)    Physical Exam:  Gen: Alert, well-developed, NAD  HEENT: NCAT, EOMI, clear conjunctiva, no scleral icterus, no erythema or exudates in the oropharynx, mmm, R submandibular mass with no tenderness to light palpation  Neck: Supple, no JVD, no LAD  CV: RRR, S1S2, no m/r/g  Resp: CTAB, normal respiratory effort  Abd: Soft, NT, ND, normal bowel sounds  Ext: no edema, no clubbing or cyanosis  Neuro: AOx3, CN2-12 grossly intact, JEAN  Skin: warm, perfused    LABS                        13.3   15.68 )-----------( 259      ( 28 Jan 2020 05:58 )             39.5                         13.4   17.84 )-----------( 253      ( 27 Jan 2020 07:19 )             39.1     01-28    141  |  108<H>  |  13  ----------------------------<  95  3.5   |  22  |  0.55  01-27    139  |  110<H>  |  20  ----------------------------<  111<H>  3.4<L>   |  17<L>  |  0.61    Ca    8.4      28 Jan 2020 05:58  Ca    8.9      27 Jan 2020 07:19  Phos  2.0     01-28  Mg     1.7     01-28                      RADIOLOGY & ADDITIONAL TESTS:    Imaging Personally Reviewed:    Consultant(s) Notes Reviewed:      Care Discussed with Consultants/Other Providers:

## 2020-03-15 ENCOUNTER — EMERGENCY (EMERGENCY)
Facility: HOSPITAL | Age: 75
LOS: 1 days | Discharge: HOME | End: 2020-03-15
Attending: EMERGENCY MEDICINE
Payer: COMMERCIAL

## 2020-03-15 VITALS
SYSTOLIC BLOOD PRESSURE: 139 MMHG | TEMPERATURE: 99 F | OXYGEN SATURATION: 97 % | HEART RATE: 77 BPM | RESPIRATION RATE: 17 BRPM | DIASTOLIC BLOOD PRESSURE: 82 MMHG

## 2020-03-15 VITALS
HEART RATE: 97 BPM | OXYGEN SATURATION: 96 % | DIASTOLIC BLOOD PRESSURE: 71 MMHG | WEIGHT: 91.93 LBS | TEMPERATURE: 99 F | RESPIRATION RATE: 16 BRPM | SYSTOLIC BLOOD PRESSURE: 110 MMHG | HEIGHT: 64 IN

## 2020-03-15 DIAGNOSIS — Z90.81 ACQUIRED ABSENCE OF SPLEEN: Chronic | ICD-10-CM

## 2020-03-15 LAB
ALBUMIN SERPL ELPH-MCNC: 3.4 G/DL — LOW (ref 3.5–5)
ALP SERPL-CCNC: 69 U/L — SIGNIFICANT CHANGE UP (ref 40–120)
ALT FLD-CCNC: 18 U/L DA — SIGNIFICANT CHANGE UP (ref 10–60)
ANION GAP SERPL CALC-SCNC: 8 MMOL/L — SIGNIFICANT CHANGE UP (ref 5–17)
APTT BLD: 43.3 SEC — HIGH (ref 27.5–36.3)
AST SERPL-CCNC: 21 U/L — SIGNIFICANT CHANGE UP (ref 10–40)
BASOPHILS # BLD AUTO: 0.03 K/UL — SIGNIFICANT CHANGE UP (ref 0–0.2)
BASOPHILS NFR BLD AUTO: 0.3 % — SIGNIFICANT CHANGE UP (ref 0–2)
BILIRUB SERPL-MCNC: 0.4 MG/DL — SIGNIFICANT CHANGE UP (ref 0.2–1.2)
BUN SERPL-MCNC: 20 MG/DL — HIGH (ref 7–18)
CALCIUM SERPL-MCNC: 9.2 MG/DL — SIGNIFICANT CHANGE UP (ref 8.4–10.5)
CHLORIDE SERPL-SCNC: 108 MMOL/L — SIGNIFICANT CHANGE UP (ref 96–108)
CO2 SERPL-SCNC: 25 MMOL/L — SIGNIFICANT CHANGE UP (ref 22–31)
CREAT SERPL-MCNC: 0.84 MG/DL — SIGNIFICANT CHANGE UP (ref 0.5–1.3)
EOSINOPHIL # BLD AUTO: 0.01 K/UL — SIGNIFICANT CHANGE UP (ref 0–0.5)
EOSINOPHIL NFR BLD AUTO: 0.1 % — SIGNIFICANT CHANGE UP (ref 0–6)
GLUCOSE SERPL-MCNC: 135 MG/DL — HIGH (ref 70–99)
HCT VFR BLD CALC: 44.1 % — SIGNIFICANT CHANGE UP (ref 34.5–45)
HGB BLD-MCNC: 14.7 G/DL — SIGNIFICANT CHANGE UP (ref 11.5–15.5)
IMM GRANULOCYTES NFR BLD AUTO: 0.4 % — SIGNIFICANT CHANGE UP (ref 0–1.5)
INR BLD: 1.73 RATIO — HIGH (ref 0.88–1.16)
LACTATE SERPL-SCNC: 3 MMOL/L — HIGH (ref 0.7–2)
LYMPHOCYTES # BLD AUTO: 1.24 K/UL — SIGNIFICANT CHANGE UP (ref 1–3.3)
LYMPHOCYTES # BLD AUTO: 11.7 % — LOW (ref 13–44)
MCHC RBC-ENTMCNC: 30.7 PG — SIGNIFICANT CHANGE UP (ref 27–34)
MCHC RBC-ENTMCNC: 33.3 GM/DL — SIGNIFICANT CHANGE UP (ref 32–36)
MCV RBC AUTO: 92.1 FL — SIGNIFICANT CHANGE UP (ref 80–100)
MONOCYTES # BLD AUTO: 0.66 K/UL — SIGNIFICANT CHANGE UP (ref 0–0.9)
MONOCYTES NFR BLD AUTO: 6.2 % — SIGNIFICANT CHANGE UP (ref 2–14)
NEUTROPHILS # BLD AUTO: 8.66 K/UL — HIGH (ref 1.8–7.4)
NEUTROPHILS NFR BLD AUTO: 81.3 % — HIGH (ref 43–77)
NRBC # BLD: 0 /100 WBCS — SIGNIFICANT CHANGE UP (ref 0–0)
PLATELET # BLD AUTO: 290 K/UL — SIGNIFICANT CHANGE UP (ref 150–400)
POTASSIUM SERPL-MCNC: 4.7 MMOL/L — SIGNIFICANT CHANGE UP (ref 3.5–5.3)
POTASSIUM SERPL-SCNC: 4.7 MMOL/L — SIGNIFICANT CHANGE UP (ref 3.5–5.3)
PROT SERPL-MCNC: 8 G/DL — SIGNIFICANT CHANGE UP (ref 6–8.3)
PROTHROM AB SERPL-ACNC: 19.9 SEC — HIGH (ref 10–12.9)
RBC # BLD: 4.79 M/UL — SIGNIFICANT CHANGE UP (ref 3.8–5.2)
RBC # FLD: 15.6 % — HIGH (ref 10.3–14.5)
SODIUM SERPL-SCNC: 141 MMOL/L — SIGNIFICANT CHANGE UP (ref 135–145)
WBC # BLD: 10.64 K/UL — HIGH (ref 3.8–10.5)
WBC # FLD AUTO: 10.64 K/UL — HIGH (ref 3.8–10.5)

## 2020-03-15 PROCEDURE — 99284 EMERGENCY DEPT VISIT MOD MDM: CPT

## 2020-03-15 PROCEDURE — 85610 PROTHROMBIN TIME: CPT

## 2020-03-15 PROCEDURE — 96374 THER/PROPH/DIAG INJ IV PUSH: CPT

## 2020-03-15 PROCEDURE — 85730 THROMBOPLASTIN TIME PARTIAL: CPT

## 2020-03-15 PROCEDURE — 94640 AIRWAY INHALATION TREATMENT: CPT

## 2020-03-15 PROCEDURE — 99284 EMERGENCY DEPT VISIT MOD MDM: CPT | Mod: 25

## 2020-03-15 PROCEDURE — 80053 COMPREHEN METABOLIC PANEL: CPT

## 2020-03-15 PROCEDURE — 85027 COMPLETE CBC AUTOMATED: CPT

## 2020-03-15 PROCEDURE — 71045 X-RAY EXAM CHEST 1 VIEW: CPT

## 2020-03-15 PROCEDURE — 93005 ELECTROCARDIOGRAM TRACING: CPT

## 2020-03-15 PROCEDURE — 87040 BLOOD CULTURE FOR BACTERIA: CPT

## 2020-03-15 PROCEDURE — 36415 COLL VENOUS BLD VENIPUNCTURE: CPT

## 2020-03-15 PROCEDURE — 83605 ASSAY OF LACTIC ACID: CPT

## 2020-03-15 PROCEDURE — 71045 X-RAY EXAM CHEST 1 VIEW: CPT | Mod: 26

## 2020-03-15 RX ORDER — ALBUTEROL 90 UG/1
2 AEROSOL, METERED ORAL ONCE
Refills: 0 | Status: COMPLETED | OUTPATIENT
Start: 2020-03-15 | End: 2020-03-15

## 2020-03-15 RX ADMIN — Medication 200 MILLIGRAM(S): at 15:50

## 2020-03-15 RX ADMIN — ALBUTEROL 2 PUFF(S): 90 AEROSOL, METERED ORAL at 15:49

## 2020-03-15 NOTE — ED ADULT NURSE NOTE - NSIMPLEMENTINTERV_GEN_ALL_ED
Implemented All Fall Risk Interventions:  [place] to call system. Call bell, personal items and telephone within reach. Instruct patient to call for assistance. Room bathroom lighting operational. Non-slip footwear when patient is off stretcher. Physically safe environment: no spills, clutter or unnecessary equipment. Stretcher in lowest position, wheels locked, appropriate side rails in place. Provide visual cue, wrist band, yellow gown, etc. Monitor gait and stability. Monitor for mental status changes and reorient to person, place, and time. Review medications for side effects contributing to fall risk. Reinforce activity limits and safety measures with patient and family.

## 2020-03-15 NOTE — ED PROVIDER NOTE - PATIENT PORTAL LINK FT
You can access the FollowMyHealth Patient Portal offered by [facility] by registering at the following website: [email or web address]. By joining [employer]’s FollowMyHealth portal, you will also be able to view your health information using other applications (apps) compatible with our system.

## 2020-03-15 NOTE — ED ADULT NURSE NOTE - OBJECTIVE STATEMENT
pt is here for cough.  pt stated that cough for 3 days, denied N/V/D or fever, denied chills, no distress noted at this time.

## 2020-03-15 NOTE — ED PROVIDER NOTE - CONSTITUTIONAL, MLM
normal... Well appearing, slender, awake, alert, oriented to person, place, time/situation and in no apparent distress. Speaking full sentences.

## 2020-03-15 NOTE — ED PROVIDER NOTE - PROGRESS NOTE DETAILS
Shetty: cxr right possible PNA.  pt has hx of polymyalgia rheumatica and on steroids chronically.  pt well appearing. normal lymphocytes.  lactate 3.  pt wants to go home  dx pna.  rx levaquin.  symptomatic management. see pcp. will need wheelchair to go home

## 2020-03-15 NOTE — ED PROVIDER NOTE - PMH
Anemia    Asthma    Cholelithiasis    Compression fracture    Diverticulosis    Gastritis    Hiatal hernia    History of hypertension    ITP (idiopathic thrombocytopenic purpura)    Muscular dystrophy    Pneumonia    Polymyositis  on Prednisone  Pulmonary embolism  in 11/2015, on Eliquis  Renal cyst, left    Type 2 diabetes mellitus without complication

## 2020-03-15 NOTE — ED PROVIDER NOTE - OBJECTIVE STATEMENT
73 y/o F with a significant PMHx of anemia, asthma, cholelithiasis, compression fracture, diverticulosis, gastritis, hiatal hernia, HTN, ITP, muscular dystrophy, PNA, PE, on Eliquis, and DM presents to the ED with complaints of cough and wheezing x 1 week, not worsening or improving with intermittent yellow sputum. Patient tried OTC cough medications to no relief. Denies fever, COVID exposure, leg swelling, chest pain, smoking, or any other acute complaints. Patient has been self quarantined and has no exposure to sick contacts. Patient uses walker to ambulate.

## 2020-09-02 ENCOUNTER — APPOINTMENT (OUTPATIENT)
Dept: NEUROLOGY | Facility: CLINIC | Age: 75
End: 2020-09-02
Payer: MEDICARE

## 2020-09-02 VITALS
SYSTOLIC BLOOD PRESSURE: 154 MMHG | DIASTOLIC BLOOD PRESSURE: 86 MMHG | BODY MASS INDEX: 16.39 KG/M2 | TEMPERATURE: 97.2 F | HEIGHT: 64 IN | HEART RATE: 88 BPM | WEIGHT: 96 LBS | OXYGEN SATURATION: 100 %

## 2020-09-02 PROCEDURE — 99205 OFFICE O/P NEW HI 60 MIN: CPT

## 2020-09-02 RX ORDER — AMLODIPINE BESYLATE 10 MG/1
10 TABLET ORAL
Refills: 0 | Status: ACTIVE | COMMUNITY
Start: 2020-09-02

## 2020-09-02 NOTE — REVIEW OF SYSTEMS
[As Noted in HPI] : as noted in HPI [Muscle Weakness] : muscle weakness [Fever] : no fever [Anxiety] : no anxiety [Eyesight Problems] : no eyesight problems [Loss Of Hearing] : no hearing loss [Shortness Of Breath] : no shortness of breath [Chest Pain] : no chest pain [Vomiting] : no vomiting [Incontinence] : no incontinence [Joint Pain] : no joint pain [Itching] : no itching [Easy Bleeding] : no tendency for easy bleeding

## 2020-09-02 NOTE — HISTORY OF PRESENT ILLNESS
[FreeTextEntry1] : The patient has h/o of polymyosistis and is here for memory problem which started few months ago.  As per the , she has trouble remembering what was just told to her.  She does not have trouble with cooking, cleaning, grooming, dressing or eating.  She does not usually go outside by her own due to the weakness in her legs, she uses a walker.  She does not get lost. Her  does the shopping and the finances, he has always been doing that in the past.\par \par She does not have any bowel or bladder issues.  No focal symptoms of a stroke.  There is no known family h/o of dementia.

## 2020-09-02 NOTE — PHYSICAL EXAM
[General Appearance - Alert] : alert [General Appearance - In No Acute Distress] : in no acute distress [Person] : oriented to person [Registration Intact] : recent registration memory intact [Concentration Intact] : normal concentrating ability [Naming Objects] : no difficulty naming common objects [Repeating Phrases] : no difficulty repeating a phrase [Fluency] : fluency intact [Comprehension] : comprehension intact [Vocabulary] : adequate range of vocabulary [Total Score ___ / 30] : the patient achieved a score of [unfilled] /30 [Date / Time ___ / 5] : date / time [unfilled] / 5 [Place ___ / 5] : place [unfilled] / 5 [Registration ___ / 3] : registration [unfilled] / 3 [Serial Sevens ___/5] : serial sevens [unfilled] / 5 [Naming 2 Objects ___ / 2] : naming two objects [unfilled] / 2 [Repeating a Sentence ___ / 1] : repeating a sentence [unfilled] / 1 [Writing a Sentence ___ / 1] : write sentence [unfilled] / 1 [Written Command ___ / 1] : written command [unfilled] / 1 [3-stage Verbal Command ___ / 3] : three-stage verbal command [unfilled] / 3 [Copy a Design ___ / 1] : copy a design [unfilled] / 1 [Recall ___ / 3] : recall [unfilled] / 3 [Cranial Nerves Optic (II)] : visual acuity intact bilaterally,  visual fields full to confrontation, pupils equal round and reactive to light [Cranial Nerves Oculomotor (III)] : extraocular motion intact [Cranial Nerves Trigeminal (V)] : facial sensation intact symmetrically [Cranial Nerves Facial (VII)] : face symmetrical [Cranial Nerves Vestibulocochlear (VIII)] : hearing was intact bilaterally [Cranial Nerves Glossopharyngeal (IX)] : tongue and palate midline [Cranial Nerves Accessory (XI - Cranial And Spinal)] : head turning and shoulder shrug symmetric [Motor Tone] : muscle tone was normal in all four extremities [Cranial Nerves Hypoglossal (XII)] : there was no tongue deviation with protrusion [Involuntary Movements] : no involuntary movements were seen [Sensation Tactile Decrease] : light touch was intact [1+] : Ankle jerk right 1+ [Place] : disoriented to place [Time] : disoriented to time [Short Term Intact] : short term memory impaired [Coordination - Dysmetria Impaired Finger-to-Nose Bilateral] : not present [Coordination - Dysmetria Impaired Heel-to-Shin Bilateral] : not present [Plantar Reflex Right Only] : normal on the right [Plantar Reflex Left Only] : normal on the left [FreeTextEntry4] : MMSE 23/30, completed HS [FreeTextEntry6] : strength full except bl Iliopsoas 4/5  bl [FreeTextEntry5] : fundi not visualized [FreeTextEntry8] : Uses walker

## 2020-09-02 NOTE — ASSESSMENT
[FreeTextEntry1] : The patient has memory problems, specifically short term.  Her MMSE 23/30, she completed HS.  Her memroy issues are likely for to Alzheimers disease but will get MRI brain and labs for reversible causes of dementia to r/o reversible causes of dementia.  Will discuss treatment at next visit.\par \par Polymyositis\par strength full except bl Iliopsoas 4/5  bl

## 2020-09-02 NOTE — CONSULT LETTER
[Dear  ___] : Dear  [unfilled], [Consult Letter:] : I had the pleasure of evaluating your patient, [unfilled]. [Consult Closing:] : Thank you very much for allowing me to participate in the care of this patient.  If you have any questions, please do not hesitate to contact me. [Please see my note below.] : Please see my note below. [Sincerely,] : Sincerely, [FreeTextEntry3] : Evelyne Jesus MD, MPH\par

## 2020-09-04 LAB
FOLATE SERPL-MCNC: >20 NG/ML
T PALLIDUM AB SER QL IA: NEGATIVE
T3 SERPL-MCNC: 92 NG/DL
T4 SERPL-MCNC: 8.8 UG/DL
TSH SERPL-ACNC: 1.65 UIU/ML
VIT B12 SERPL-MCNC: 1664 PG/ML

## 2020-09-10 LAB — METHYLMALONATE SERPL-SCNC: 134 NMOL/L

## 2020-10-14 ENCOUNTER — APPOINTMENT (OUTPATIENT)
Dept: CT IMAGING | Facility: HOSPITAL | Age: 75
End: 2020-10-14
Payer: MEDICARE

## 2020-10-14 ENCOUNTER — OUTPATIENT (OUTPATIENT)
Dept: OUTPATIENT SERVICES | Facility: HOSPITAL | Age: 75
LOS: 1 days | End: 2020-10-14
Payer: COMMERCIAL

## 2020-10-14 DIAGNOSIS — R41.3 OTHER AMNESIA: ICD-10-CM

## 2020-10-14 DIAGNOSIS — Z90.81 ACQUIRED ABSENCE OF SPLEEN: Chronic | ICD-10-CM

## 2020-10-14 PROCEDURE — 70450 CT HEAD/BRAIN W/O DYE: CPT | Mod: 26

## 2020-10-14 PROCEDURE — 70450 CT HEAD/BRAIN W/O DYE: CPT

## 2020-10-19 ENCOUNTER — APPOINTMENT (OUTPATIENT)
Dept: NEUROLOGY | Facility: CLINIC | Age: 75
End: 2020-10-19
Payer: MEDICARE

## 2020-10-19 VITALS
DIASTOLIC BLOOD PRESSURE: 78 MMHG | WEIGHT: 95 LBS | BODY MASS INDEX: 16.22 KG/M2 | HEIGHT: 64 IN | SYSTOLIC BLOOD PRESSURE: 126 MMHG | HEART RATE: 80 BPM

## 2020-10-19 PROCEDURE — 99072 ADDL SUPL MATRL&STAF TM PHE: CPT

## 2020-10-19 PROCEDURE — 99214 OFFICE O/P EST MOD 30 MIN: CPT

## 2020-10-19 NOTE — DATA REVIEWED
[de-identified] : CTH images reviewed: small chronic cva\par labs for reversible causes unrmarakble

## 2020-10-19 NOTE — HISTORY OF PRESENT ILLNESS
[FreeTextEntry1] : The patient has h/o of polymyosistis and is here for memory problem which started few months ago. As per the , she has trouble remembering what was just told to her. She does not have trouble with cooking, cleaning, grooming, dressing or eating. She does not usually go outside by her own due to the weakness in her legs, she uses a walker. She does not get lost. Her  does the shopping and the finances, he has always been doing that in the past.\par \par She does not have any bowel or bladder issues. No focal symptoms of a stroke. There is no known family h/o of dementia. \par

## 2020-10-19 NOTE — ASSESSMENT
[FreeTextEntry1] : The patient has memory problems, specifically short term with MMSE 23/30, due to Alzheimers disease will start Aricept 5mg.\par \par cva on elaquis\par will start lipitor 20mg\par CD, TTE, LDL/HbA!C, EKG, Holter monitor\par \par Polymyositis\par strength full except bl Iliopsoas 4/5 bl

## 2020-10-19 NOTE — PHYSICAL EXAM
[General Appearance - Alert] : alert [General Appearance - In No Acute Distress] : in no acute distress [Person] : oriented to person [Registration Intact] : recent registration memory intact [Concentration Intact] : normal concentrating ability [Naming Objects] : no difficulty naming common objects [Repeating Phrases] : no difficulty repeating a phrase [Fluency] : fluency intact [Comprehension] : comprehension intact [Vocabulary] : adequate range of vocabulary [Cranial Nerves Optic (II)] : visual acuity intact bilaterally,  visual fields full to confrontation, pupils equal round and reactive to light [Remote Intact] : remote memory intact [Cranial Nerves Oculomotor (III)] : extraocular motion intact [Motor Tone] : muscle tone was normal in all four extremities [Cranial Nerves Facial (VII)] : face symmetrical [Cranial Nerves Trigeminal (V)] : facial sensation intact symmetrically [Involuntary Movements] : no involuntary movements were seen [Sensation Tactile Decrease] : light touch was intact [Place] : disoriented to place [Time] : disoriented to time [Coordination - Dysmetria Impaired Heel-to-Shin Bilateral] : not present [Coordination - Dysmetria Impaired Finger-to-Nose Bilateral] : not present [FreeTextEntry6] : strength full except bl Iliopsoas 4/5 bl [FreeTextEntry8] : walks with walker

## 2020-10-20 LAB
CHOLEST SERPL-MCNC: 215 MG/DL
CHOLEST/HDLC SERPL: 3 RATIO
ESTIMATED AVERAGE GLUCOSE: 128 MG/DL
HBA1C MFR BLD HPLC: 6.1 %
HDLC SERPL-MCNC: 72 MG/DL
LDLC SERPL CALC-MCNC: 127 MG/DL
TRIGL SERPL-MCNC: 77 MG/DL

## 2020-10-21 ENCOUNTER — OUTPATIENT (OUTPATIENT)
Dept: OUTPATIENT SERVICES | Facility: HOSPITAL | Age: 75
LOS: 1 days | End: 2020-10-21
Payer: COMMERCIAL

## 2020-10-21 DIAGNOSIS — I63.9 CEREBRAL INFARCTION, UNSPECIFIED: ICD-10-CM

## 2020-10-21 DIAGNOSIS — Z90.81 ACQUIRED ABSENCE OF SPLEEN: Chronic | ICD-10-CM

## 2020-10-21 PROCEDURE — 93306 TTE W/DOPPLER COMPLETE: CPT

## 2020-10-21 PROCEDURE — 93306 TTE W/DOPPLER COMPLETE: CPT | Mod: 26

## 2020-10-23 ENCOUNTER — APPOINTMENT (OUTPATIENT)
Dept: ULTRASOUND IMAGING | Facility: HOSPITAL | Age: 75
End: 2020-10-23
Payer: MEDICARE

## 2021-01-04 ENCOUNTER — APPOINTMENT (OUTPATIENT)
Dept: NEUROLOGY | Facility: CLINIC | Age: 76
End: 2021-01-04
Payer: MEDICARE

## 2021-01-04 VITALS
TEMPERATURE: 98.4 F | DIASTOLIC BLOOD PRESSURE: 82 MMHG | HEIGHT: 64 IN | OXYGEN SATURATION: 99 % | HEART RATE: 108 BPM | WEIGHT: 95 LBS | BODY MASS INDEX: 16.22 KG/M2 | SYSTOLIC BLOOD PRESSURE: 132 MMHG

## 2021-01-04 PROCEDURE — 99214 OFFICE O/P EST MOD 30 MIN: CPT

## 2021-01-04 PROCEDURE — 99072 ADDL SUPL MATRL&STAF TM PHE: CPT

## 2021-01-04 RX ORDER — DONEPEZIL HYDROCHLORIDE 5 MG/1
5 TABLET ORAL
Qty: 30 | Refills: 2 | Status: DISCONTINUED | COMMUNITY
Start: 2020-10-19 | End: 2021-01-04

## 2021-01-04 NOTE — ASSESSMENT
[FreeTextEntry1] : The patient has memory problems, specifically short term with MMSE 23/30, due to Alzheimers disease \par was on Aricept 5mg but had side effects (aggression), will switch to Namenda\par Recommend: physical exercise x 3-4 times per week for at least 30 min, intellectual stimulation and Mediterranean diet (nutritionist referral given)\par \par cva on elaquis\par will increase lipitor from 20 to 40 mg\par cw AC\par ,. HbA1C 6.1\par TTE: PFO; will get LE dupplex\par CD, EKG, Holter monitor (cards eval)\par \par Polymyositis\par strength full except bl Iliopsoas 4/5 bl.

## 2021-01-04 NOTE — HISTORY OF PRESENT ILLNESS
[FreeTextEntry1] : The patient has h/o of polymyosistis and is here for memory problem which started few months ago. As per the , she has trouble remembering what was just told to her. She does not have trouble with cooking, cleaning, grooming, dressing or eating. She does not usually go outside by her own due to the weakness in her legs, she uses a walker. She does not get lost. Her  does the shopping and the finances, he has always been doing that in the past.\par \par She does not have any bowel or bladder issues. No focal symptoms of a stroke. There is no known family h/o of dementia. \par \par Son says that the patient had a reaction to Aricept, she was was aggressive.  He says that she has improvement of memory with physical exercise and improved nutrition.

## 2021-01-04 NOTE — PHYSICAL EXAM
[General Appearance - Alert] : alert [General Appearance - In No Acute Distress] : in no acute distress [Person] : oriented to person [Registration Intact] : recent registration memory intact [Concentration Intact] : normal concentrating ability [Visual Intact] : visual attention was ~T not ~L decreased [Naming Objects] : no difficulty naming common objects [Fluency] : fluency intact [Comprehension] : comprehension intact [Vocabulary] : adequate range of vocabulary [Cranial Nerves Optic (II)] : visual acuity intact bilaterally,  visual fields full to confrontation, pupils equal round and reactive to light [Cranial Nerves Oculomotor (III)] : extraocular motion intact [Cranial Nerves Trigeminal (V)] : facial sensation intact symmetrically [Cranial Nerves Facial (VII)] : face symmetrical [Motor Tone] : muscle tone was normal in all four extremities [Involuntary Movements] : no involuntary movements were seen [Sensation Tactile Decrease] : light touch was intact [Time] : disoriented to time [Short Term Intact] : short term memory impaired [Coordination - Dysmetria Impaired Finger-to-Nose Bilateral] : not present [Coordination - Dysmetria Impaired Heel-to-Shin Bilateral] : not present [FreeTextEntry4] : 2/3 with prompting, able to choose from 3 choices [FreeTextEntry6] : iliopsoas 4/5+ bl [FreeTextEntry8] : Uses a walker

## 2021-01-08 ENCOUNTER — OUTPATIENT (OUTPATIENT)
Dept: OUTPATIENT SERVICES | Facility: HOSPITAL | Age: 76
LOS: 1 days | End: 2021-01-08
Payer: COMMERCIAL

## 2021-01-08 ENCOUNTER — APPOINTMENT (OUTPATIENT)
Dept: ULTRASOUND IMAGING | Facility: HOSPITAL | Age: 76
End: 2021-01-08

## 2021-01-08 DIAGNOSIS — Z90.81 ACQUIRED ABSENCE OF SPLEEN: Chronic | ICD-10-CM

## 2021-01-08 DIAGNOSIS — R41.3 OTHER AMNESIA: ICD-10-CM

## 2021-01-08 DIAGNOSIS — I63.9 CEREBRAL INFARCTION, UNSPECIFIED: ICD-10-CM

## 2021-01-08 PROCEDURE — 93970 EXTREMITY STUDY: CPT

## 2021-01-08 PROCEDURE — 93970 EXTREMITY STUDY: CPT | Mod: 26

## 2021-02-22 ENCOUNTER — APPOINTMENT (OUTPATIENT)
Dept: NEUROLOGY | Facility: CLINIC | Age: 76
End: 2021-02-22
Payer: SELF-PAY

## 2021-02-22 VITALS
HEART RATE: 83 BPM | HEIGHT: 61 IN | SYSTOLIC BLOOD PRESSURE: 130 MMHG | BODY MASS INDEX: 17.75 KG/M2 | DIASTOLIC BLOOD PRESSURE: 88 MMHG | OXYGEN SATURATION: 100 % | TEMPERATURE: 97.2 F | WEIGHT: 94 LBS

## 2021-02-22 PROCEDURE — 99213 OFFICE O/P EST LOW 20 MIN: CPT

## 2021-02-22 PROCEDURE — 99072 ADDL SUPL MATRL&STAF TM PHE: CPT

## 2021-02-22 NOTE — PHYSICAL EXAM
[General Appearance - Alert] : alert [General Appearance - In No Acute Distress] : in no acute distress [Person] : oriented to person [Place] : disoriented to place [Time] : disoriented to time [Concentration Intact] : normal concentrating ability [Naming Objects] : no difficulty naming common objects [Fluency] : fluency intact [Comprehension] : comprehension intact [Vocabulary] : adequate range of vocabulary

## 2021-02-22 NOTE — ASSESSMENT
[FreeTextEntry1] : The patient has memory problems, specifically short term with MMSE 23/30, due to Alzheimers disease \par was on Aricept 5mg but had side effects (aggression), which has been stopped.  WIll give Rx for Namenda again.\par Recommend: physical exercise x 3-4 times per week for at least 30 min, intellectual stimulation and Mediterranean diet.  Nutritionist referral given.\par \par cva on elaquis\par will increase lipitor from 20 to 40 mg\par cw AC\par ,. HbA1C 6.1\par TTE: PFO\par CD, EKG, Holter monitor (cards eval)\par \par Polymyositis\par strength full except bl Iliopsoas 4/5 bl. \par

## 2021-02-22 NOTE — HISTORY OF PRESENT ILLNESS
[FreeTextEntry1] : The patient has h/o of polymyosistis and is here for memory problem which started few months ago. As per the , she has trouble remembering what was just told to her. She does not have trouble with cooking, cleaning, grooming, dressing or eating. She does not usually go outside by her own due to the weakness in her legs, she uses a walker. She does not get lost. Her  does the shopping and the finances, he has always been doing that in the past.\par \par She does not have any bowel or bladder issues. No focal symptoms of a stroke. There is no known family h/o of dementia. \par \par Son says that the patient had a reaction to Aricept, she was was aggressive. The Aricept was stopped but the patient has not started the Namenda.  She also has not had the recommended stroke w/p.

## 2021-02-26 ENCOUNTER — OUTPATIENT (OUTPATIENT)
Dept: OUTPATIENT SERVICES | Facility: HOSPITAL | Age: 76
LOS: 1 days | End: 2021-02-26
Payer: COMMERCIAL

## 2021-02-26 ENCOUNTER — APPOINTMENT (OUTPATIENT)
Dept: ULTRASOUND IMAGING | Facility: HOSPITAL | Age: 76
End: 2021-02-26
Payer: MEDICARE

## 2021-02-26 DIAGNOSIS — I63.9 CEREBRAL INFARCTION, UNSPECIFIED: ICD-10-CM

## 2021-02-26 DIAGNOSIS — Z90.81 ACQUIRED ABSENCE OF SPLEEN: Chronic | ICD-10-CM

## 2021-02-26 PROCEDURE — 93880 EXTRACRANIAL BILAT STUDY: CPT | Mod: 26

## 2021-02-26 PROCEDURE — 93880 EXTRACRANIAL BILAT STUDY: CPT

## 2021-03-22 NOTE — ED ADULT TRIAGE NOTE - ESI TRIAGE ACUITY LEVEL, MLM
Group Therapy Documentation    PATIENT'S NAME: Portillo Queen  MRN:   [id number]  :   1993  ACCT. NUMBER: [id number]  DATE OF SERVICE: 3/22/21  START TIME: 12:30 PM  END TIME:  2:30 PM  FACILITATOR(S): Shirley Nicole LADC  TOPIC: BEH Group Therapy  Number of patients attending the group: 5  Group Length:  2 Hours    Group Therapy Type: Emotion processing    Summary of Group / Topics Discussed:    Sober coping skills, Disease of addiction, and Emotions/expression      Group Attendance:  Attended group session    Patient's response to the group topic/interactions:  cooperative with task    Patient appeared to be Actively participating, Attentive and Engaged.        Client specific details: Portillo was an active participant in afternoon group therapy session.  
Group Therapy Documentation    PATIENT'S NAME: Portillo Queen  MRN:   [id number]  :   1993  ACCT. NUMBER: [id number]  DATE OF SERVICE: 3/22/21  START TIME:  9:00 AM  END TIME: 11:00 AM  FACILITATOR(S): Radha Gonzales LADC  TOPIC: BEH Group Therapy  Number of patients attending the group:  4  Group Length:  2 hours    Group Therapy Type: Recovery strategies    Summary of Group / Topics Discussed:    Recovery Principles      Group Attendance:  Attended group session    Patient's response to the group topic/interactions:  cooperative with task    Patient appeared to be Actively participating, Attentive and Engaged.        Client specific details:  Portillo attended morning group therapy session.  He participated in a discussion on messages we got from our family and friends before treatment and now that we are in treatment, and a meditative exercise.  Portillo demonstrated support for another group member who presented his relapse prevention play by asking questions and providing positive feedback.  
3

## 2021-05-02 ENCOUNTER — EMERGENCY (EMERGENCY)
Facility: HOSPITAL | Age: 76
LOS: 1 days | Discharge: HOME | End: 2021-05-02
Attending: EMERGENCY MEDICINE
Payer: COMMERCIAL

## 2021-05-02 VITALS
OXYGEN SATURATION: 97 % | DIASTOLIC BLOOD PRESSURE: 76 MMHG | RESPIRATION RATE: 18 BRPM | HEART RATE: 84 BPM | TEMPERATURE: 98 F | SYSTOLIC BLOOD PRESSURE: 122 MMHG

## 2021-05-02 VITALS
WEIGHT: 97 LBS | HEIGHT: 64 IN | OXYGEN SATURATION: 99 % | HEART RATE: 93 BPM | DIASTOLIC BLOOD PRESSURE: 92 MMHG | TEMPERATURE: 98 F | SYSTOLIC BLOOD PRESSURE: 121 MMHG | RESPIRATION RATE: 18 BRPM

## 2021-05-02 DIAGNOSIS — Z90.81 ACQUIRED ABSENCE OF SPLEEN: Chronic | ICD-10-CM

## 2021-05-02 LAB
ALBUMIN SERPL ELPH-MCNC: 3.8 G/DL — SIGNIFICANT CHANGE UP (ref 3.5–5)
ALP SERPL-CCNC: 91 U/L — SIGNIFICANT CHANGE UP (ref 40–120)
ALT FLD-CCNC: 30 U/L DA — SIGNIFICANT CHANGE UP (ref 10–60)
ANION GAP SERPL CALC-SCNC: 7 MMOL/L — SIGNIFICANT CHANGE UP (ref 5–17)
APPEARANCE UR: CLEAR — SIGNIFICANT CHANGE UP
APTT BLD: 36.6 SEC — HIGH (ref 27.5–35.5)
AST SERPL-CCNC: 45 U/L — HIGH (ref 10–40)
B PERT DNA SPEC QL NAA+PROBE: SIGNIFICANT CHANGE UP
BACTERIA # UR AUTO: ABNORMAL /HPF
BASOPHILS # BLD AUTO: 0.05 K/UL — SIGNIFICANT CHANGE UP (ref 0–0.2)
BASOPHILS NFR BLD AUTO: 0.5 % — SIGNIFICANT CHANGE UP (ref 0–2)
BILIRUB SERPL-MCNC: 0.4 MG/DL — SIGNIFICANT CHANGE UP (ref 0.2–1.2)
BILIRUB UR-MCNC: NEGATIVE — SIGNIFICANT CHANGE UP
BUN SERPL-MCNC: 24 MG/DL — HIGH (ref 7–18)
C PNEUM DNA SPEC QL NAA+PROBE: SIGNIFICANT CHANGE UP
CALCIUM SERPL-MCNC: 8.8 MG/DL — SIGNIFICANT CHANGE UP (ref 8.4–10.5)
CHLORIDE SERPL-SCNC: 112 MMOL/L — HIGH (ref 96–108)
CO2 SERPL-SCNC: 25 MMOL/L — SIGNIFICANT CHANGE UP (ref 22–31)
COLOR SPEC: YELLOW — SIGNIFICANT CHANGE UP
CREAT SERPL-MCNC: 0.68 MG/DL — SIGNIFICANT CHANGE UP (ref 0.5–1.3)
DIFF PNL FLD: ABNORMAL
EOSINOPHIL # BLD AUTO: 0.15 K/UL — SIGNIFICANT CHANGE UP (ref 0–0.5)
EOSINOPHIL NFR BLD AUTO: 1.6 % — SIGNIFICANT CHANGE UP (ref 0–6)
EPI CELLS # UR: ABNORMAL /HPF
FLUAV H1 2009 PAND RNA SPEC QL NAA+PROBE: SIGNIFICANT CHANGE UP
FLUAV H1 RNA SPEC QL NAA+PROBE: SIGNIFICANT CHANGE UP
FLUAV H3 RNA SPEC QL NAA+PROBE: SIGNIFICANT CHANGE UP
FLUAV SUBTYP SPEC NAA+PROBE: SIGNIFICANT CHANGE UP
FLUBV RNA SPEC QL NAA+PROBE: SIGNIFICANT CHANGE UP
GLUCOSE SERPL-MCNC: 84 MG/DL — SIGNIFICANT CHANGE UP (ref 70–99)
GLUCOSE UR QL: NEGATIVE — SIGNIFICANT CHANGE UP
HADV DNA SPEC QL NAA+PROBE: SIGNIFICANT CHANGE UP
HCOV PNL SPEC NAA+PROBE: SIGNIFICANT CHANGE UP
HCT VFR BLD CALC: 45.4 % — HIGH (ref 34.5–45)
HGB BLD-MCNC: 15.6 G/DL — HIGH (ref 11.5–15.5)
HMPV RNA SPEC QL NAA+PROBE: SIGNIFICANT CHANGE UP
HPIV1 RNA SPEC QL NAA+PROBE: SIGNIFICANT CHANGE UP
HPIV2 RNA SPEC QL NAA+PROBE: SIGNIFICANT CHANGE UP
HPIV3 RNA SPEC QL NAA+PROBE: SIGNIFICANT CHANGE UP
HPIV4 RNA SPEC QL NAA+PROBE: SIGNIFICANT CHANGE UP
IMM GRANULOCYTES NFR BLD AUTO: 0.5 % — SIGNIFICANT CHANGE UP (ref 0–1.5)
INR BLD: 1.34 RATIO — HIGH (ref 0.88–1.16)
KETONES UR-MCNC: NEGATIVE — SIGNIFICANT CHANGE UP
LACTATE SERPL-SCNC: 1.3 MMOL/L — SIGNIFICANT CHANGE UP (ref 0.7–2)
LEUKOCYTE ESTERASE UR-ACNC: NEGATIVE — SIGNIFICANT CHANGE UP
LYMPHOCYTES # BLD AUTO: 2.02 K/UL — SIGNIFICANT CHANGE UP (ref 1–3.3)
LYMPHOCYTES # BLD AUTO: 21.3 % — SIGNIFICANT CHANGE UP (ref 13–44)
MCHC RBC-ENTMCNC: 31.6 PG — SIGNIFICANT CHANGE UP (ref 27–34)
MCHC RBC-ENTMCNC: 34.4 GM/DL — SIGNIFICANT CHANGE UP (ref 32–36)
MCV RBC AUTO: 91.9 FL — SIGNIFICANT CHANGE UP (ref 80–100)
MONOCYTES # BLD AUTO: 1.01 K/UL — HIGH (ref 0–0.9)
MONOCYTES NFR BLD AUTO: 10.7 % — SIGNIFICANT CHANGE UP (ref 2–14)
NEUTROPHILS # BLD AUTO: 6.19 K/UL — SIGNIFICANT CHANGE UP (ref 1.8–7.4)
NEUTROPHILS NFR BLD AUTO: 65.4 % — SIGNIFICANT CHANGE UP (ref 43–77)
NITRITE UR-MCNC: NEGATIVE — SIGNIFICANT CHANGE UP
NRBC # BLD: 0 /100 WBCS — SIGNIFICANT CHANGE UP (ref 0–0)
NT-PROBNP SERPL-SCNC: 179 PG/ML — HIGH (ref 0–125)
PH UR: 7 — SIGNIFICANT CHANGE UP (ref 5–8)
PLATELET # BLD AUTO: 286 K/UL — SIGNIFICANT CHANGE UP (ref 150–400)
POTASSIUM SERPL-MCNC: 3.7 MMOL/L — SIGNIFICANT CHANGE UP (ref 3.5–5.3)
POTASSIUM SERPL-SCNC: 3.7 MMOL/L — SIGNIFICANT CHANGE UP (ref 3.5–5.3)
PROT SERPL-MCNC: 8 G/DL — SIGNIFICANT CHANGE UP (ref 6–8.3)
PROT UR-MCNC: NEGATIVE — SIGNIFICANT CHANGE UP
PROTHROM AB SERPL-ACNC: 15.7 SEC — HIGH (ref 10.6–13.6)
RAPID RVP RESULT: DETECTED
RBC # BLD: 4.94 M/UL — SIGNIFICANT CHANGE UP (ref 3.8–5.2)
RBC # FLD: 14.4 % — SIGNIFICANT CHANGE UP (ref 10.3–14.5)
RBC CASTS # UR COMP ASSIST: ABNORMAL /HPF (ref 0–2)
RV+EV RNA SPEC QL NAA+PROBE: DETECTED
SARS-COV-2 RNA SPEC QL NAA+PROBE: SIGNIFICANT CHANGE UP
SODIUM SERPL-SCNC: 144 MMOL/L — SIGNIFICANT CHANGE UP (ref 135–145)
SP GR SPEC: 1.01 — SIGNIFICANT CHANGE UP (ref 1.01–1.02)
TROPONIN I SERPL-MCNC: <0.015 NG/ML — SIGNIFICANT CHANGE UP (ref 0–0.04)
UROBILINOGEN FLD QL: NEGATIVE — SIGNIFICANT CHANGE UP
WBC # BLD: 9.47 K/UL — SIGNIFICANT CHANGE UP (ref 3.8–10.5)
WBC # FLD AUTO: 9.47 K/UL — SIGNIFICANT CHANGE UP (ref 3.8–10.5)
WBC UR QL: SIGNIFICANT CHANGE UP /HPF (ref 0–5)

## 2021-05-02 PROCEDURE — 83880 ASSAY OF NATRIURETIC PEPTIDE: CPT

## 2021-05-02 PROCEDURE — 71045 X-RAY EXAM CHEST 1 VIEW: CPT

## 2021-05-02 PROCEDURE — 80053 COMPREHEN METABOLIC PANEL: CPT

## 2021-05-02 PROCEDURE — 85610 PROTHROMBIN TIME: CPT

## 2021-05-02 PROCEDURE — 71045 X-RAY EXAM CHEST 1 VIEW: CPT | Mod: 26

## 2021-05-02 PROCEDURE — 87040 BLOOD CULTURE FOR BACTERIA: CPT

## 2021-05-02 PROCEDURE — 83605 ASSAY OF LACTIC ACID: CPT

## 2021-05-02 PROCEDURE — 99285 EMERGENCY DEPT VISIT HI MDM: CPT | Mod: 25

## 2021-05-02 PROCEDURE — 99285 EMERGENCY DEPT VISIT HI MDM: CPT

## 2021-05-02 PROCEDURE — 85730 THROMBOPLASTIN TIME PARTIAL: CPT

## 2021-05-02 PROCEDURE — 85025 COMPLETE CBC W/AUTO DIFF WBC: CPT

## 2021-05-02 PROCEDURE — 81001 URINALYSIS AUTO W/SCOPE: CPT

## 2021-05-02 PROCEDURE — 0225U NFCT DS DNA&RNA 21 SARSCOV2: CPT

## 2021-05-02 PROCEDURE — 84484 ASSAY OF TROPONIN QUANT: CPT

## 2021-05-02 PROCEDURE — 87086 URINE CULTURE/COLONY COUNT: CPT

## 2021-05-02 PROCEDURE — 36415 COLL VENOUS BLD VENIPUNCTURE: CPT

## 2021-05-02 PROCEDURE — 94640 AIRWAY INHALATION TREATMENT: CPT

## 2021-05-02 RX ORDER — ALBUTEROL 90 UG/1
2 AEROSOL, METERED ORAL
Refills: 0 | Status: COMPLETED | OUTPATIENT
Start: 2021-05-02 | End: 2021-05-02

## 2021-05-02 RX ORDER — AZITHROMYCIN 500 MG/1
500 TABLET, FILM COATED ORAL ONCE
Refills: 0 | Status: COMPLETED | OUTPATIENT
Start: 2021-05-02 | End: 2021-05-02

## 2021-05-02 RX ORDER — ALBUTEROL 90 UG/1
3 AEROSOL, METERED ORAL
Qty: 60 | Refills: 0
Start: 2021-05-02 | End: 2021-05-06

## 2021-05-02 RX ORDER — AZITHROMYCIN 500 MG/1
1 TABLET, FILM COATED ORAL
Qty: 4 | Refills: 0
Start: 2021-05-02 | End: 2021-05-05

## 2021-05-02 RX ADMIN — ALBUTEROL 2 PUFF(S): 90 AEROSOL, METERED ORAL at 06:53

## 2021-05-02 RX ADMIN — ALBUTEROL 2 PUFF(S): 90 AEROSOL, METERED ORAL at 06:32

## 2021-05-02 RX ADMIN — AZITHROMYCIN 500 MILLIGRAM(S): 500 TABLET, FILM COATED ORAL at 07:37

## 2021-05-02 RX ADMIN — ALBUTEROL 2 PUFF(S): 90 AEROSOL, METERED ORAL at 07:15

## 2021-05-02 NOTE — ED ADULT NURSE NOTE - NSIMPLEMENTINTERV_GEN_ALL_ED
Implemented All Fall Risk Interventions:  [place] to call system. Call bell, personal items and telephone within reach. Instruct patient to call for assistance. Room bathroom lighting operational. Non-slip footwear when patient is off stretcher. Physically safe environment: no spills, clutter or unnecessary equipment. Stretcher in lowest position, wheels locked, appropriate side rails in place. Provide visual cue, wrist band, yellow gown, etc. Monitor gait and stability. Monitor for mental status changes and reorient to person, place, and time. Review medications for side effects contributing to fall risk. Reinforce activity limits and safety measures with patient and family.

## 2021-05-02 NOTE — ED PROVIDER NOTE - NSFOLLOWUPCLINICS_GEN_ALL_ED_FT
[facility]  [facility]  [address] - [address]nd [place] [address]  Phone: [phone]  Fax: [phone]

## 2021-05-02 NOTE — ED PROVIDER NOTE - CLINICAL SUMMARY MEDICAL DECISION MAKING FREE TEXT BOX
74yo F w asthma, questionable CHF int he ED for cough/SOB/wheezing for 2 days. Asthma vs CHF exacerbation, will get labs/CXR, treat, reevaluate.

## 2021-05-02 NOTE — ED PROVIDER NOTE - NSFOLLOWUPINSTRUCTIONS_ED_ALL_ED_FT
Take meds as prescribed.  Take Tylenol as needed for pains/fevers.  Drink plenty of fluids.  Follow up with your doctor or in the Clinic as discussed within 1 week.  Return to the ER for any concerns.

## 2021-05-02 NOTE — ED PROVIDER NOTE - PHYSICAL EXAMINATION
Well appearing, in NAD  Moist mucosae  Pink conjunctivae  Lungs w bilat diffuse mild wheezes, good air movement  Cardiac w RRR, no JVD  Abdomen soft/NT  No CVAT  No pedal edema, no calf TTP  Neck supple  AAOx3, no gross neuro deficits

## 2021-05-02 NOTE — ED PROVIDER NOTE - PROGRESS NOTE DETAILS
reevaluated at bedside, feels much better, wants to go home. Labs, UA, CXR wnl. RVP pending, low suspicion for Covid. Discussed options for DC vs admission, wants to go home,  in the waiting room. Good return instructions provided. Will DC w Zpack and Albuterol vials for nebulizer. Advised to f/u w PCP within 1 week.

## 2021-05-02 NOTE — ED PROVIDER NOTE - OBJECTIVE STATEMENT
74yo F w/ PMHx anemia, asthma, cholelithiasis, diverticulosis, gastritis, hiatal hernia, DM, HTN, ITP, muscular dystrophy, polymyositis on 5mg Prednisone daily, PE on Eliquis, in the ER for productive cough of white sputum and wheezing/SOB for 2 days. Denies CP/back pains/fevers/chills. No sick contacts, Covid vaccinated. Has had similar stx prior.

## 2021-05-03 LAB
CULTURE RESULTS: SIGNIFICANT CHANGE UP
SPECIMEN SOURCE: SIGNIFICANT CHANGE UP

## 2021-06-24 ENCOUNTER — APPOINTMENT (OUTPATIENT)
Dept: NEUROLOGY | Facility: CLINIC | Age: 76
End: 2021-06-24

## 2021-12-02 ENCOUNTER — EMERGENCY (EMERGENCY)
Facility: HOSPITAL | Age: 76
LOS: 1 days | Discharge: HOME | End: 2021-12-02
Attending: STUDENT IN AN ORGANIZED HEALTH CARE EDUCATION/TRAINING PROGRAM
Payer: COMMERCIAL

## 2021-12-02 VITALS
WEIGHT: 97 LBS | TEMPERATURE: 99 F | DIASTOLIC BLOOD PRESSURE: 75 MMHG | OXYGEN SATURATION: 97 % | RESPIRATION RATE: 18 BRPM | HEIGHT: 64 IN | HEART RATE: 98 BPM | SYSTOLIC BLOOD PRESSURE: 115 MMHG

## 2021-12-02 DIAGNOSIS — Z90.81 ACQUIRED ABSENCE OF SPLEEN: Chronic | ICD-10-CM

## 2021-12-02 LAB
ALBUMIN SERPL ELPH-MCNC: 3.8 G/DL — SIGNIFICANT CHANGE UP (ref 3.5–5)
ALP SERPL-CCNC: 60 U/L — SIGNIFICANT CHANGE UP (ref 40–120)
ALT FLD-CCNC: 33 U/L DA — SIGNIFICANT CHANGE UP (ref 10–60)
ANION GAP SERPL CALC-SCNC: 8 MMOL/L — SIGNIFICANT CHANGE UP (ref 5–17)
APPEARANCE UR: CLEAR — SIGNIFICANT CHANGE UP
AST SERPL-CCNC: 48 U/L — HIGH (ref 10–40)
BACTERIA # UR AUTO: NEGATIVE /HPF — SIGNIFICANT CHANGE UP
BASOPHILS # BLD AUTO: 0.03 K/UL — SIGNIFICANT CHANGE UP (ref 0–0.2)
BASOPHILS NFR BLD AUTO: 0.2 % — SIGNIFICANT CHANGE UP (ref 0–2)
BILIRUB SERPL-MCNC: 0.7 MG/DL — SIGNIFICANT CHANGE UP (ref 0.2–1.2)
BILIRUB UR-MCNC: NEGATIVE — SIGNIFICANT CHANGE UP
BUN SERPL-MCNC: 20 MG/DL — HIGH (ref 7–18)
CALCIUM SERPL-MCNC: 9.7 MG/DL — SIGNIFICANT CHANGE UP (ref 8.4–10.5)
CHLORIDE SERPL-SCNC: 111 MMOL/L — HIGH (ref 96–108)
CO2 SERPL-SCNC: 27 MMOL/L — SIGNIFICANT CHANGE UP (ref 22–31)
COLOR SPEC: YELLOW — SIGNIFICANT CHANGE UP
CREAT SERPL-MCNC: 0.9 MG/DL — SIGNIFICANT CHANGE UP (ref 0.5–1.3)
DIFF PNL FLD: ABNORMAL
EOSINOPHIL # BLD AUTO: 0.01 K/UL — SIGNIFICANT CHANGE UP (ref 0–0.5)
EOSINOPHIL NFR BLD AUTO: 0.1 % — SIGNIFICANT CHANGE UP (ref 0–6)
EPI CELLS # UR: NEGATIVE /HPF — SIGNIFICANT CHANGE UP
GLUCOSE SERPL-MCNC: 96 MG/DL — SIGNIFICANT CHANGE UP (ref 70–99)
GLUCOSE UR QL: NEGATIVE — SIGNIFICANT CHANGE UP
HCT VFR BLD CALC: 44.6 % — SIGNIFICANT CHANGE UP (ref 34.5–45)
HGB BLD-MCNC: 15 G/DL — SIGNIFICANT CHANGE UP (ref 11.5–15.5)
IMM GRANULOCYTES NFR BLD AUTO: 0.7 % — SIGNIFICANT CHANGE UP (ref 0–1.5)
KETONES UR-MCNC: NEGATIVE — SIGNIFICANT CHANGE UP
LEUKOCYTE ESTERASE UR-ACNC: NEGATIVE — SIGNIFICANT CHANGE UP
LYMPHOCYTES # BLD AUTO: 1.07 K/UL — SIGNIFICANT CHANGE UP (ref 1–3.3)
LYMPHOCYTES # BLD AUTO: 8.1 % — LOW (ref 13–44)
MAGNESIUM SERPL-MCNC: 2.3 MG/DL — SIGNIFICANT CHANGE UP (ref 1.6–2.6)
MCHC RBC-ENTMCNC: 31.1 PG — SIGNIFICANT CHANGE UP (ref 27–34)
MCHC RBC-ENTMCNC: 33.6 GM/DL — SIGNIFICANT CHANGE UP (ref 32–36)
MCV RBC AUTO: 92.5 FL — SIGNIFICANT CHANGE UP (ref 80–100)
MONOCYTES # BLD AUTO: 0.8 K/UL — SIGNIFICANT CHANGE UP (ref 0–0.9)
MONOCYTES NFR BLD AUTO: 6 % — SIGNIFICANT CHANGE UP (ref 2–14)
NEUTROPHILS # BLD AUTO: 11.23 K/UL — HIGH (ref 1.8–7.4)
NEUTROPHILS NFR BLD AUTO: 84.9 % — HIGH (ref 43–77)
NITRITE UR-MCNC: NEGATIVE — SIGNIFICANT CHANGE UP
NRBC # BLD: 0 /100 WBCS — SIGNIFICANT CHANGE UP (ref 0–0)
PH UR: 7 — SIGNIFICANT CHANGE UP (ref 5–8)
PHOSPHATE SERPL-MCNC: 2.9 MG/DL — SIGNIFICANT CHANGE UP (ref 2.5–4.5)
PLATELET # BLD AUTO: 246 K/UL — SIGNIFICANT CHANGE UP (ref 150–400)
POTASSIUM SERPL-MCNC: 3.9 MMOL/L — SIGNIFICANT CHANGE UP (ref 3.5–5.3)
POTASSIUM SERPL-SCNC: 3.9 MMOL/L — SIGNIFICANT CHANGE UP (ref 3.5–5.3)
PROT SERPL-MCNC: 7.8 G/DL — SIGNIFICANT CHANGE UP (ref 6–8.3)
PROT UR-MCNC: NEGATIVE — SIGNIFICANT CHANGE UP
RBC # BLD: 4.82 M/UL — SIGNIFICANT CHANGE UP (ref 3.8–5.2)
RBC # FLD: 15 % — HIGH (ref 10.3–14.5)
RBC CASTS # UR COMP ASSIST: ABNORMAL /HPF (ref 0–2)
SODIUM SERPL-SCNC: 146 MMOL/L — HIGH (ref 135–145)
SP GR SPEC: 1.01 — SIGNIFICANT CHANGE UP (ref 1.01–1.02)
UROBILINOGEN FLD QL: NEGATIVE — SIGNIFICANT CHANGE UP
WBC # BLD: 13.23 K/UL — HIGH (ref 3.8–10.5)
WBC # FLD AUTO: 13.23 K/UL — HIGH (ref 3.8–10.5)
WBC UR QL: SIGNIFICANT CHANGE UP /HPF (ref 0–5)

## 2021-12-02 PROCEDURE — 99284 EMERGENCY DEPT VISIT MOD MDM: CPT | Mod: 25

## 2021-12-02 PROCEDURE — 85025 COMPLETE CBC W/AUTO DIFF WBC: CPT

## 2021-12-02 PROCEDURE — 96360 HYDRATION IV INFUSION INIT: CPT

## 2021-12-02 PROCEDURE — 84100 ASSAY OF PHOSPHORUS: CPT

## 2021-12-02 PROCEDURE — 36415 COLL VENOUS BLD VENIPUNCTURE: CPT

## 2021-12-02 PROCEDURE — 71045 X-RAY EXAM CHEST 1 VIEW: CPT

## 2021-12-02 PROCEDURE — 99284 EMERGENCY DEPT VISIT MOD MDM: CPT

## 2021-12-02 PROCEDURE — 83735 ASSAY OF MAGNESIUM: CPT

## 2021-12-02 PROCEDURE — 71045 X-RAY EXAM CHEST 1 VIEW: CPT | Mod: 26

## 2021-12-02 PROCEDURE — 82962 GLUCOSE BLOOD TEST: CPT

## 2021-12-02 PROCEDURE — 80053 COMPREHEN METABOLIC PANEL: CPT

## 2021-12-02 PROCEDURE — 81001 URINALYSIS AUTO W/SCOPE: CPT

## 2021-12-02 PROCEDURE — 87086 URINE CULTURE/COLONY COUNT: CPT

## 2021-12-02 RX ORDER — SODIUM CHLORIDE 9 MG/ML
1000 INJECTION INTRAMUSCULAR; INTRAVENOUS; SUBCUTANEOUS ONCE
Refills: 0 | Status: COMPLETED | OUTPATIENT
Start: 2021-12-02 | End: 2021-12-02

## 2021-12-02 RX ADMIN — SODIUM CHLORIDE 1000 MILLILITER(S): 9 INJECTION INTRAMUSCULAR; INTRAVENOUS; SUBCUTANEOUS at 15:30

## 2021-12-02 RX ADMIN — SODIUM CHLORIDE 1000 MILLILITER(S): 9 INJECTION INTRAMUSCULAR; INTRAVENOUS; SUBCUTANEOUS at 14:25

## 2021-12-02 NOTE — ED PROVIDER NOTE - NSFOLLOWUPINSTRUCTIONS_ED_ALL_ED_FT
You were seen in the emergency department for weakness.     Please follow-up with your primary care doctor in the next 24-48 hours.     Please take Tylenol as prescribed on the bottle for symptom control.     If you have any worsening symptoms, severe headache, chest pain or shortness of breath, please return to the emergency department.

## 2021-12-02 NOTE — ED PROVIDER NOTE - CLINICAL SUMMARY MEDICAL DECISION MAKING FREE TEXT BOX
76F presenting with weakness and AMS. nonfocal neuro exam. low concern for CVA. family and patient updated on results. symptoms improved after fluids. family comfortable with taking patient home. will discharge.

## 2021-12-02 NOTE — ED PROVIDER NOTE - PHYSICAL EXAMINATION
General: well appearing female, no acute distress   HEENT: normocephalic, atraumatic   Respiratory: normal work of breathing, lungs clear to auscultation bilaterally   Cardiac: regular rate and rhythm   Abdomen: soft, non-tender, no guarding or rebound   MSK: no swelling or tenderness of lower extremities, moving all extremities spontaneously   Skin: warm, dry   Neuro: A&Ox2, cranial nerves II-XII intact   Psych: appropriate affect

## 2021-12-02 NOTE — ED PROVIDER NOTE - PATIENT PORTAL LINK FT
You can access the FollowMyHealth Patient Portal offered by [facility] by registering at the following website: [email or web address]. By joining [employer]’s FollowMyHealth portal, you will also be able to view your health information using other applications (apps) compatible with our system.

## 2021-12-02 NOTE — ED PROVIDER NOTE - OBJECTIVE STATEMENT
76F, pmh of anemia, asthma, DM, HTN, ITP, muscular dystrophy, polymyositis, presenting with altered mental status. patient reports she has been feeling tired and weak. family reports patient appears more confused than usual. no fever, chest pain, shortness of breath, nausea, vomiting.

## 2021-12-02 NOTE — ED ADULT TRIAGE NOTE - HEIGHT IN INCHES
"[facility] [310942]  Chief Complaint   Patient presents with     RECHECK     pt being seen in [facility] clinic for f/u and medication help     Initial BP 96/57 (BP Location: Right arm, Patient Position: Sitting, Cuff Size: Child)   Pulse 64   Ht 4' 0.11\" (122.2 cm)   Wt 45 lb 13.7 oz (20.8 kg)   HC 50.4 cm (19.84\")   BMI 13.93 kg/m   Estimated body mass index is 13.93 kg/m  as calculated from the following:    Height as of this encounter: 4' 0.11\" (122.2 cm).    Weight as of this encounter: 45 lb 13.7 oz (20.8 kg).  Medication Reconciliation: complete   Altagracia Masters LPN    Arm circ: 16.4cm  Altagracia Masters LPN      "
4

## 2021-12-02 NOTE — ED PROVIDER NOTE - NSICDXPASTMEDICALHX_GEN_ALL_CORE_FT
PAST MEDICAL HISTORY:  Anemia     Asthma     Cholelithiasis     Compression fracture     Diverticulosis     Gastritis     Hiatal hernia     History of hypertension     ITP (idiopathic thrombocytopenic purpura)     Muscular dystrophy     Pneumonia     Polymyositis on Prednisone    Pulmonary embolism in 11/2015, on Eliquis    Renal cyst, left     Type 2 diabetes mellitus without complication

## 2021-12-02 NOTE — ED ADULT NURSE NOTE - NSIMPLEMENTINTERV_GEN_ALL_ED
Implemented All Universal Safety Interventions:  [place] to call system. Call bell, personal items and telephone within reach. Instruct patient to call for assistance. Room bathroom lighting operational. Non-slip footwear when patient is off stretcher. Physically safe environment: no spills, clutter or unnecessary equipment. Stretcher in lowest position, wheels locked, appropriate side rails in place.

## 2021-12-02 NOTE — ED ADULT NURSE NOTE - NS ED NURSE LEVEL OF CONSCIOUSNESS ORIENTATION
36 y/o M no significant PMHx p/w left ankle pain s/p eversion, dorsiflexion after getting out of SUV missing the stepoff, states pain with ambulation, denies falling, no other injuries or complaints.
Disoriented

## 2021-12-03 LAB
CULTURE RESULTS: SIGNIFICANT CHANGE UP
SPECIMEN SOURCE: SIGNIFICANT CHANGE UP

## 2022-01-20 ENCOUNTER — APPOINTMENT (OUTPATIENT)
Dept: RHEUMATOLOGY | Facility: CLINIC | Age: 77
End: 2022-01-20

## 2022-02-15 ENCOUNTER — APPOINTMENT (OUTPATIENT)
Dept: NEUROLOGY | Facility: CLINIC | Age: 77
End: 2022-02-15
Payer: MEDICARE

## 2022-02-15 VITALS
WEIGHT: 97 LBS | SYSTOLIC BLOOD PRESSURE: 115 MMHG | TEMPERATURE: 97.7 F | BODY MASS INDEX: 18.31 KG/M2 | HEART RATE: 95 BPM | HEIGHT: 61 IN | DIASTOLIC BLOOD PRESSURE: 72 MMHG | OXYGEN SATURATION: 97 %

## 2022-02-15 PROCEDURE — 99214 OFFICE O/P EST MOD 30 MIN: CPT

## 2022-02-15 NOTE — HISTORY OF PRESENT ILLNESS
[FreeTextEntry1] : The patient has h/o of polymyosistis and is here for memory problem which started few months ago. As per the , she has trouble remembering what was just told to her. She does not have trouble with cooking, cleaning, grooming, dressing or eating. She does not usually go outside by her own due to the weakness in her legs, she uses a walker. She does not get lost. Her  does the shopping and the finances, he has always been doing that in the past.\par \par She does not have any bowel or bladder issues. No focal symptoms of a stroke. There is no known family h/o of dementia. \par \par Son says that the patient had a reaction to Aricept, she was was aggressive. The Aricept was stopped but the patient has not started the Namenda. \par \par No clinical change since last visit.\par

## 2022-02-15 NOTE — ASSESSMENT
[FreeTextEntry1] : The patient has memory problems, specifically short term with MMSE 23/30, due to Alzheimers disease \par was on Aricept 5mg but had side effects (aggression), which has been stopped. WIll give Rx for Namenda again.\par Recommend: physical exercise x 3-4 times per week for at least 30 min, intellectual stimulation and Mediterranean diet. \par \par cva on elaquis\par secondary stroke prevention: will increase lipitor from 20 to 40 mg and cw AC\par ,. HbA1C 6.1\par \par Polymyositis on steroids\par strength full except bl Iliopsoas 4/5 bl. \par will refer to PT\par \par I spent the time noted on the day of this patient encounter preparing for, providing and documenting the above E/M service and counseling and educate patient on differential, workup, disease course, and treatment/management. Education was provided to the patient during this encounter. All questions and concerns were answered and addressed in detail. The patient verbalized understanding and agreed to plan. Patient was advised to continue to monitor for neurologic symptoms and to notify my office or go to the nearest emergency room if there are any changes. Any orders/referrals and communications were provided as well. \par Side effects of the above medications were discussed in detail including but not limited to applicable black box warning and teratogenicity as appropriate. \par Patient was advised to bring previous records to my office, including CD of imaging, when applicable. \par \par 
Head is atraumatic. Head shape is symmetrical.

## 2022-02-22 NOTE — DISCHARGE NOTE PROVIDER - CARE PROVIDER_API CALL
Stroke [place] Survey      Responses   Facility patient discharged from? [facility]   Attempt successful Yes   Call start time 1124   Person spoke with today (if not patient) and relationship Caregiver   Did the patient die within 30 days of admission? No   Did the patient die within 30 days of discharge? No   Call end time [id number]   Patient location 30 days post discharge if known Home   Was the patient readmitted within 30 days of discharge? No   Could you live alone without any help from another person? Yes   Can you do everything that you were doing right before your stroke even if slower and not as much? Yes   Are you completely back to the way you were right before your stroke? No   Can you walk from one room to another without help from another person? Yes   Can the patient sit up in bed without any help? Yes   Call Center [place] Score 1   [place] score call completed Yes   Comments Spoke with wife. She reports occasional word finding difficulty. He is able to manage all his ususal daily activities without difficulty.  He is scheduled for an ablation on Wednesday.           Britany Gallegos RN  
[facility]  Phone: (   )    -  Fax: (   )    -  Follow Up Time:

## 2022-05-16 ENCOUNTER — APPOINTMENT (OUTPATIENT)
Dept: NEUROLOGY | Facility: CLINIC | Age: 77
End: 2022-05-16

## 2022-06-30 ENCOUNTER — APPOINTMENT (OUTPATIENT)
Dept: SURGERY | Facility: CLINIC | Age: 77
End: 2022-06-30

## 2022-06-30 VITALS
HEIGHT: 61 IN | SYSTOLIC BLOOD PRESSURE: 106 MMHG | BODY MASS INDEX: 18.5 KG/M2 | HEART RATE: 80 BPM | WEIGHT: 98 LBS | DIASTOLIC BLOOD PRESSURE: 71 MMHG

## 2022-06-30 VITALS — TEMPERATURE: 98.3 F

## 2022-06-30 DIAGNOSIS — I10 ESSENTIAL (PRIMARY) HYPERTENSION: ICD-10-CM

## 2022-06-30 PROCEDURE — 99203 OFFICE O/P NEW LOW 30 MIN: CPT

## 2022-06-30 NOTE — PLAN
[FreeTextEntry1] : surgical intervention not recommended at this time \par findings discussed w the patient \par apply ice packs, cold compress \par \par patient will follow up if needed. Warning signs, follow up, and restrictions were discussed with the patient.\par Patient's questions and concerns addressed to their satisfaction, and patient verbalized an understanding of the information discussed.\par

## 2022-06-30 NOTE — REVIEW OF SYSTEMS
[Lower Ext Edema] : lower extremity edema [Arthralgias] : arthralgias [Fever] : no fever [Chills] : no chills [Feeling Poorly] : not feeling poorly [Chest Pain] : no chest pain [Shortness Of Breath] : no shortness of breath [Cough] : no cough [Abdominal Pain] : no abdominal pain [Skin Lesions] : no skin lesions [Skin Wound] : no skin wound [Dizziness] : no dizziness [Anxiety] : no anxiety [Muscle Weakness] : no muscle weakness [Swollen Glands] : no swollen glands [FreeTextEntry5] : pain , swelling to LE, bilaterally  [FreeTextEntry9] : ambulates via wheel walker

## 2022-06-30 NOTE — ASSESSMENT
[FreeTextEntry1] : Ms. QUIGLEY is a 76 year y/o F presents with LE erythema and edema with fluctuance, no evidence of acute infection, no masses felt. Most recent imaging reviewed. \par On exam, she was found to have fluctuance w erythema to anteromedial aspect of LE, to the same location, bilaterally, appears to be c/w trauma.

## 2022-06-30 NOTE — CONSULT LETTER
[Dear  ___] : Dear  [unfilled], [Consult Letter:] : I had the pleasure of evaluating your patient, [unfilled]. [Consult Closing:] : Thank you very much for allowing me to participate in the care of this patient.  If you have any questions, please do not hesitate to contact me. [Sincerely,] : Sincerely, [FreeTextEntry3] : Warren Dimas MD\par

## 2022-06-30 NOTE — PHYSICAL EXAM
[Skin Induration] : induration [Alert] : alert [Oriented to Person] : oriented to person [Oriented to Place] : oriented to place [Oriented to Time] : oriented to time [Calm] : calm [Skin Ulcer] : no ulcer [de-identified] : A/Ox3; NAD. appears comfortable [de-identified] : EOMI; sclera anicteric. [de-identified] : no cervical lymphadenopathy  [de-identified] : airway patent, no use of accessory muscles [de-identified] : abd is soft, NT/ND\par  [de-identified] : ambulates via wheel walker  [de-identified] : erythema, LE, bilaterally , no masses felt , she has some swelling /fluctuance to both legs, anteromedial aspect, to the same location on both legs

## 2022-06-30 NOTE — HISTORY OF PRESENT ILLNESS
[de-identified] : DERRICK QUIGLEY is a 76 year old F who is referred to the office for consultation visit, she presents w the cc of having pain/swelling to both legs. She is on Eliquis. She had a sonogram done, 05/18/22, c/w no evidence of DVT to the RLE, complex lesion measuring 4.7 x 1.9 x 4.0 cm without internal or wall vascularity in the soft tissues of the mid calf probably representing hematoma.

## 2022-06-30 NOTE — REASON FOR VISIT
[Consultation] : a consultation visit [Family Member] : family member [FreeTextEntry1] : BILATERAL LE Pain

## 2022-07-27 ENCOUNTER — APPOINTMENT (OUTPATIENT)
Dept: NEUROLOGY | Facility: CLINIC | Age: 77
End: 2022-07-27

## 2022-08-05 ENCOUNTER — OUTPATIENT (OUTPATIENT)
Dept: OUTPATIENT SERVICES | Facility: HOSPITAL | Age: 77
LOS: 1 days | End: 2022-08-05
Payer: COMMERCIAL

## 2022-08-05 DIAGNOSIS — I34.0 NONRHEUMATIC MITRAL (VALVE) INSUFFICIENCY: ICD-10-CM

## 2022-08-05 DIAGNOSIS — Z90.81 ACQUIRED ABSENCE OF SPLEEN: Chronic | ICD-10-CM

## 2022-08-05 PROCEDURE — 93320 DOPPLER ECHO COMPLETE: CPT

## 2022-08-05 PROCEDURE — 93320 DOPPLER ECHO COMPLETE: CPT | Mod: 26

## 2022-08-05 PROCEDURE — 93325 DOPPLER ECHO COLOR FLOW MAPG: CPT

## 2022-08-05 PROCEDURE — 93312 ECHO TRANSESOPHAGEAL: CPT

## 2022-08-05 PROCEDURE — 93325 DOPPLER ECHO COLOR FLOW MAPG: CPT | Mod: 26

## 2022-08-05 PROCEDURE — 93312 ECHO TRANSESOPHAGEAL: CPT | Mod: 26

## 2022-08-12 ENCOUNTER — APPOINTMENT (OUTPATIENT)
Dept: VASCULAR SURGERY | Facility: CLINIC | Age: 77
End: 2022-08-12

## 2022-08-12 VITALS
SYSTOLIC BLOOD PRESSURE: 119 MMHG | WEIGHT: 98 LBS | BODY MASS INDEX: 18.5 KG/M2 | HEIGHT: 61 IN | DIASTOLIC BLOOD PRESSURE: 74 MMHG | HEART RATE: 91 BPM

## 2022-08-12 DIAGNOSIS — I87.2 VENOUS INSUFFICIENCY (CHRONIC) (PERIPHERAL): ICD-10-CM

## 2022-08-12 PROCEDURE — 99204 OFFICE O/P NEW MOD 45 MIN: CPT

## 2022-08-12 PROCEDURE — 93970 EXTREMITY STUDY: CPT

## 2022-08-12 NOTE — PHYSICAL EXAM
[2+] : left 2+ [Ankle Swelling (On Exam)] : present [Ankle Swelling Bilaterally] : bilaterally  [] : bilaterally [Ankle Swelling On The Left] : moderate [No Rash or Lesion] : No rash or lesion [Alert] : alert [Calm] : calm [JVD] : no jugular venous distention  [Varicose Veins Of Lower Extremities] : not present [Skin Ulcer] : no ulcer [de-identified] : Appears well [de-identified] : Thinned out skin over the base of the left hallux

## 2022-08-12 NOTE — ASSESSMENT
[FreeTextEntry1] : 76-year-old female with a past medical history of hypertension, congestive heart failure, A. fib on anticoagulation presents for evaluation of bilateral lower extremity edema\par \par Venous duplex shows no evidence of DVT or SVT bilaterally right mid calf noted for nonvascular fluid collection measuring about 2.3 x 0.8 cm, left lower extremity noted for reflux in the greater saphenous vein at the level of the thigh only mid calf fluid collection also noted to be nonvascular measuring 4.3 x 1.5 cm in size.\par \par At this time would recommend compression stockings and elevation we will repeat the duplex in 1 month

## 2022-08-12 NOTE — HISTORY OF PRESENT ILLNESS
[FreeTextEntry1] : 76-year-old female with a past medical history of hypertension, congestive heart failure, A. fib on anticoagulation presents for evaluation of bilateral lower extremity edema.  Patient denies any history of trauma to the left lower extremity reports that the edema has been a common progressively worse over the last couple of months and mass in the left calf has been present for 2 months.  Patient also reports wound over left first MTP joint being followed by podiatry given history of chronic fracture and toe deformity with persistent pressure on the skin.  Patient states that she has worn compression stockings in the past without any improvement in her symptoms.  Patient denies any history of blood clots in the past patient denies any history of wounds around the medial malleolus or anterior shin

## 2022-09-19 ENCOUNTER — RX RENEWAL (OUTPATIENT)
Age: 77
End: 2022-09-19

## 2022-09-22 ENCOUNTER — APPOINTMENT (OUTPATIENT)
Dept: NEUROLOGY | Facility: CLINIC | Age: 77
End: 2022-09-22

## 2022-09-22 VITALS
TEMPERATURE: 97.6 F | SYSTOLIC BLOOD PRESSURE: 106 MMHG | HEART RATE: 102 BPM | DIASTOLIC BLOOD PRESSURE: 72 MMHG | OXYGEN SATURATION: 96 % | HEIGHT: 61 IN | WEIGHT: 86 LBS | BODY MASS INDEX: 16.24 KG/M2

## 2022-09-22 DIAGNOSIS — R41.3 OTHER AMNESIA: ICD-10-CM

## 2022-09-22 PROCEDURE — 99215 OFFICE O/P EST HI 40 MIN: CPT

## 2022-09-22 RX ORDER — MEMANTINE HYDROCHLORIDE 5 MG/1
5 TABLET, FILM COATED ORAL
Qty: 30 | Refills: 5 | Status: DISCONTINUED | COMMUNITY
Start: 2021-01-04 | End: 2022-09-22

## 2022-09-22 NOTE — ASSESSMENT
[FreeTextEntry1] : The patient has memory problems, specifically short term with MMSE 23/30, due to Alzheimers disease \par was on Aricept 5mg but had side effects (aggression), which has been stopped. Cw Namenda 5mg bid.\par Recommend: physical exercise x 3-4 times per week for at least 30 min, intellectual stimulation and Mediterranean diet. \par will repeat MMSE and eval for increase Namenda when ICH resolve\par \par cva on elaquis\par secondary stroke prevention: will increase lipitor from 20 to 40 mg and cw AC\par ,. HbA1C 6.1\par \par ICH s/p fall, mechanical, seen at [facility]\par was on Keppra for sz prophylaxis, has not had Sz, has SE of hallucination and behavioral chages and stopped by family\par will get repeat CTH to eval for interval blood\par \par Polymyositis on steroids\par strength full except bl Iliopsoas 4/5 bl. \par will refer to PT\par \par I spent the time noted on the day of this patient encounter preparing for, providing and documenting the above E/M service and counseling and educate patient on differential, workup, disease course, and treatment/management. Education was provided to the patient during this encounter. All questions and concerns were answered and addressed in detail. The patient verbalized understanding and agreed to plan. Patient was advised to continue to monitor for neurologic symptoms and to notify my office or go to the nearest emergency room if there are any changes. Any orders/referrals and communications were provided as well. \par Side effects of the above medications were discussed in detail including but not limited to applicable black box warning and teratogenicity as appropriate. \par Patient was advised to bring previous records to my office, including CD of imaging, when applicable. \par

## 2022-09-22 NOTE — HISTORY OF PRESENT ILLNESS
[FreeTextEntry1] : The patient has h/o of polymyosistis and is here for memory problem which started few months ago. As per the , she has trouble remembering what was just told to her. She does not have trouble with cooking, cleaning, grooming, dressing or eating. She does not usually go outside by her own due to the weakness in her legs, she uses a walker. She does not get lost. Her  does the shopping and the finances, he has always been doing that in the past.\par \par She does not have any bowel or bladder issues. No focal symptoms of a stroke. There is no known family h/o of dementia. \par \par Son says that the patient had a reaction to Aricept, she was was aggressive. The Aricept was stopped and patient was started the Namenda. \par \par Patient had a fall mechanical and had ICH at [facility], has not had sz, Keppra d/elaine by family due to hallucinations which have now resolved.  Patient's memory has been been progressing.\par \par

## 2022-09-22 NOTE — PHYSICAL EXAM
[General Appearance - Alert] : alert [General Appearance - In No Acute Distress] : in no acute distress [Person] : oriented to person [Concentration Intact] : normal concentrating ability [Naming Objects] : no difficulty naming common objects [Fluency] : fluency intact [Comprehension] : comprehension intact [Vocabulary] : adequate range of vocabulary [Cranial Nerves Optic (II)] : visual acuity intact bilaterally,  visual fields full to confrontation, pupils equal round and reactive to light [Cranial Nerves Oculomotor (III)] : extraocular motion intact [Cranial Nerves Trigeminal (V)] : facial sensation intact symmetrically [Cranial Nerves Facial (VII)] : face symmetrical [Motor Tone] : muscle tone was normal in all four extremities [Motor Strength] : muscle strength was normal in all four extremities [Involuntary Movements] : no involuntary movements were seen [Sensation Tactile Decrease] : light touch was intact [Non-ambulatory] : Non-ambulatory

## 2022-09-30 ENCOUNTER — APPOINTMENT (OUTPATIENT)
Dept: VASCULAR SURGERY | Facility: CLINIC | Age: 77
End: 2022-09-30

## 2022-10-06 ENCOUNTER — INPATIENT (INPATIENT)
Facility: HOSPITAL | Age: 77
LOS: 1 days | Discharge: HOME | DRG: 871 | End: 2022-10-08
Attending: INTERNAL MEDICINE | Admitting: INTERNAL MEDICINE
Payer: COMMERCIAL

## 2022-10-06 VITALS
OXYGEN SATURATION: 97 % | TEMPERATURE: 101 F | WEIGHT: 85.1 LBS | HEIGHT: 64 IN | SYSTOLIC BLOOD PRESSURE: 117 MMHG | HEART RATE: 115 BPM | RESPIRATION RATE: 18 BRPM | DIASTOLIC BLOOD PRESSURE: 72 MMHG

## 2022-10-06 DIAGNOSIS — A41.9 SEPSIS, UNSPECIFIED ORGANISM: ICD-10-CM

## 2022-10-06 DIAGNOSIS — D64.9 ANEMIA, UNSPECIFIED: ICD-10-CM

## 2022-10-06 DIAGNOSIS — F03.90 UNSPECIFIED DEMENTIA, UNSPECIFIED SEVERITY, WITHOUT BEHAVIORAL DISTURBANCE, PSYCHOTIC DISTURBANCE, MOOD DISTURBANCE, AND ANXIETY: ICD-10-CM

## 2022-10-06 DIAGNOSIS — I10 ESSENTIAL (PRIMARY) HYPERTENSION: ICD-10-CM

## 2022-10-06 DIAGNOSIS — Z29.9 ENCOUNTER FOR PROPHYLACTIC MEASURES, UNSPECIFIED: ICD-10-CM

## 2022-10-06 DIAGNOSIS — E78.5 HYPERLIPIDEMIA, UNSPECIFIED: ICD-10-CM

## 2022-10-06 DIAGNOSIS — R50.9 FEVER, UNSPECIFIED: ICD-10-CM

## 2022-10-06 DIAGNOSIS — Z90.81 ACQUIRED ABSENCE OF SPLEEN: Chronic | ICD-10-CM

## 2022-10-06 DIAGNOSIS — M33.20 POLYMYOSITIS, ORGAN INVOLVEMENT UNSPECIFIED: ICD-10-CM

## 2022-10-06 LAB
ALBUMIN SERPL ELPH-MCNC: 2.9 G/DL — LOW (ref 3.5–5)
ALP SERPL-CCNC: 86 U/L — SIGNIFICANT CHANGE UP (ref 40–120)
ALT FLD-CCNC: 25 U/L DA — SIGNIFICANT CHANGE UP (ref 10–60)
ANION GAP SERPL CALC-SCNC: 10 MMOL/L — SIGNIFICANT CHANGE UP (ref 5–17)
APPEARANCE UR: CLEAR — SIGNIFICANT CHANGE UP
APTT BLD: 32.5 SEC — SIGNIFICANT CHANGE UP (ref 27.5–35.5)
AST SERPL-CCNC: 41 U/L — HIGH (ref 10–40)
BASOPHILS # BLD AUTO: 0.03 K/UL — SIGNIFICANT CHANGE UP (ref 0–0.2)
BASOPHILS NFR BLD AUTO: 0.2 % — SIGNIFICANT CHANGE UP (ref 0–2)
BILIRUB SERPL-MCNC: 0.4 MG/DL — SIGNIFICANT CHANGE UP (ref 0.2–1.2)
BILIRUB UR-MCNC: NEGATIVE — SIGNIFICANT CHANGE UP
BUN SERPL-MCNC: 45 MG/DL — HIGH (ref 7–18)
CALCIUM SERPL-MCNC: 9.2 MG/DL — SIGNIFICANT CHANGE UP (ref 8.4–10.5)
CHLORIDE SERPL-SCNC: 111 MMOL/L — HIGH (ref 96–108)
CK SERPL-CCNC: 220 U/L — HIGH (ref 21–215)
CO2 SERPL-SCNC: 22 MMOL/L — SIGNIFICANT CHANGE UP (ref 22–31)
COLOR SPEC: YELLOW — SIGNIFICANT CHANGE UP
CREAT SERPL-MCNC: 0.64 MG/DL — SIGNIFICANT CHANGE UP (ref 0.5–1.3)
DIFF PNL FLD: ABNORMAL
EGFR: 92 ML/MIN/1.73M2 — SIGNIFICANT CHANGE UP
EOSINOPHIL # BLD AUTO: 0.02 K/UL — SIGNIFICANT CHANGE UP (ref 0–0.5)
EOSINOPHIL NFR BLD AUTO: 0.1 % — SIGNIFICANT CHANGE UP (ref 0–6)
GLUCOSE SERPL-MCNC: 95 MG/DL — SIGNIFICANT CHANGE UP (ref 70–99)
GLUCOSE UR QL: NEGATIVE — SIGNIFICANT CHANGE UP
HCT VFR BLD CALC: 33.1 % — LOW (ref 34.5–45)
HGB BLD-MCNC: 10.7 G/DL — LOW (ref 11.5–15.5)
IMM GRANULOCYTES NFR BLD AUTO: 0.7 % — SIGNIFICANT CHANGE UP (ref 0–0.9)
INR BLD: 1.17 RATIO — HIGH (ref 0.88–1.16)
KETONES UR-MCNC: NEGATIVE — SIGNIFICANT CHANGE UP
LACTATE SERPL-SCNC: 1.7 MMOL/L — SIGNIFICANT CHANGE UP (ref 0.7–2)
LDH SERPL L TO P-CCNC: 213 U/L — SIGNIFICANT CHANGE UP (ref 120–225)
LEUKOCYTE ESTERASE UR-ACNC: ABNORMAL
LYMPHOCYTES # BLD AUTO: 1.76 K/UL — SIGNIFICANT CHANGE UP (ref 1–3.3)
LYMPHOCYTES # BLD AUTO: 9.9 % — LOW (ref 13–44)
MCHC RBC-ENTMCNC: 30.7 PG — SIGNIFICANT CHANGE UP (ref 27–34)
MCHC RBC-ENTMCNC: 32.3 GM/DL — SIGNIFICANT CHANGE UP (ref 32–36)
MCV RBC AUTO: 94.8 FL — SIGNIFICANT CHANGE UP (ref 80–100)
MONOCYTES # BLD AUTO: 1.11 K/UL — HIGH (ref 0–0.9)
MONOCYTES NFR BLD AUTO: 6.2 % — SIGNIFICANT CHANGE UP (ref 2–14)
NEUTROPHILS # BLD AUTO: 14.72 K/UL — HIGH (ref 1.8–7.4)
NEUTROPHILS NFR BLD AUTO: 82.9 % — HIGH (ref 43–77)
NITRITE UR-MCNC: NEGATIVE — SIGNIFICANT CHANGE UP
NRBC # BLD: 0 /100 WBCS — SIGNIFICANT CHANGE UP (ref 0–0)
PH UR: 6 — SIGNIFICANT CHANGE UP (ref 5–8)
PLATELET # BLD AUTO: 317 K/UL — SIGNIFICANT CHANGE UP (ref 150–400)
POTASSIUM SERPL-MCNC: 3.8 MMOL/L — SIGNIFICANT CHANGE UP (ref 3.5–5.3)
POTASSIUM SERPL-SCNC: 3.8 MMOL/L — SIGNIFICANT CHANGE UP (ref 3.5–5.3)
PROT SERPL-MCNC: 7 G/DL — SIGNIFICANT CHANGE UP (ref 6–8.3)
PROT UR-MCNC: NEGATIVE — SIGNIFICANT CHANGE UP
PROTHROM AB SERPL-ACNC: 13.9 SEC — HIGH (ref 10.5–13.4)
RAPID RVP RESULT: SIGNIFICANT CHANGE UP
RBC # BLD: 3.49 M/UL — LOW (ref 3.8–5.2)
RBC # FLD: 16.9 % — HIGH (ref 10.3–14.5)
SARS-COV-2 RNA SPEC QL NAA+PROBE: SIGNIFICANT CHANGE UP
SODIUM SERPL-SCNC: 143 MMOL/L — SIGNIFICANT CHANGE UP (ref 135–145)
SP GR SPEC: 1.01 — SIGNIFICANT CHANGE UP (ref 1.01–1.02)
TROPONIN I, HIGH SENSITIVITY RESULT: 66.8 NG/L — HIGH
TROPONIN I, HIGH SENSITIVITY RESULT: 75.4 NG/L — HIGH
UROBILINOGEN FLD QL: NEGATIVE — SIGNIFICANT CHANGE UP
WBC # BLD: 17.77 K/UL — HIGH (ref 3.8–10.5)
WBC # FLD AUTO: 17.77 K/UL — HIGH (ref 3.8–10.5)

## 2022-10-06 PROCEDURE — 99285 EMERGENCY DEPT VISIT HI MDM: CPT

## 2022-10-06 PROCEDURE — 93010 ELECTROCARDIOGRAM REPORT: CPT

## 2022-10-06 PROCEDURE — 93970 EXTREMITY STUDY: CPT | Mod: 26

## 2022-10-06 PROCEDURE — 99223 1ST HOSP IP/OBS HIGH 75: CPT | Mod: GC

## 2022-10-06 PROCEDURE — 71045 X-RAY EXAM CHEST 1 VIEW: CPT | Mod: 26

## 2022-10-06 PROCEDURE — 71260 CT THORAX DX C+: CPT | Mod: 26

## 2022-10-06 PROCEDURE — 70491 CT SOFT TISSUE NECK W/DYE: CPT | Mod: 26

## 2022-10-06 RX ORDER — CILOSTAZOL 100 MG/1
1 TABLET ORAL
Qty: 0 | Refills: 0 | DISCHARGE

## 2022-10-06 RX ORDER — BACLOFEN 100 %
5 POWDER (GRAM) MISCELLANEOUS ONCE
Refills: 0 | Status: COMPLETED | OUTPATIENT
Start: 2022-10-06 | End: 2022-10-06

## 2022-10-06 RX ORDER — SODIUM CHLORIDE 9 MG/ML
1200 INJECTION, SOLUTION INTRAVENOUS ONCE
Refills: 0 | Status: COMPLETED | OUTPATIENT
Start: 2022-10-06 | End: 2022-10-06

## 2022-10-06 RX ORDER — ACETAMINOPHEN 500 MG
975 TABLET ORAL ONCE
Refills: 0 | Status: COMPLETED | OUTPATIENT
Start: 2022-10-06 | End: 2022-10-06

## 2022-10-06 RX ORDER — FLUDROCORTISONE ACETATE 0.1 MG/1
1 TABLET ORAL
Qty: 0 | Refills: 0 | DISCHARGE

## 2022-10-06 RX ORDER — ENOXAPARIN SODIUM 100 MG/ML
40 INJECTION SUBCUTANEOUS EVERY 24 HOURS
Refills: 0 | Status: DISCONTINUED | OUTPATIENT
Start: 2022-10-06 | End: 2022-10-08

## 2022-10-06 RX ORDER — MEMANTINE HYDROCHLORIDE 10 MG/1
5 TABLET ORAL
Refills: 0 | Status: DISCONTINUED | OUTPATIENT
Start: 2022-10-07 | End: 2022-10-08

## 2022-10-06 RX ORDER — AMLODIPINE BESYLATE 2.5 MG/1
10 TABLET ORAL DAILY
Refills: 0 | Status: DISCONTINUED | OUTPATIENT
Start: 2022-10-07 | End: 2022-10-08

## 2022-10-06 RX ORDER — SODIUM CHLORIDE 9 MG/ML
1000 INJECTION INTRAMUSCULAR; INTRAVENOUS; SUBCUTANEOUS
Refills: 0 | Status: DISCONTINUED | OUTPATIENT
Start: 2022-10-06 | End: 2022-10-08

## 2022-10-06 RX ORDER — FLUDROCORTISONE ACETATE 0.1 MG/1
0.1 TABLET ORAL DAILY
Refills: 0 | Status: DISCONTINUED | OUTPATIENT
Start: 2022-10-07 | End: 2022-10-08

## 2022-10-06 RX ORDER — METRONIDAZOLE 500 MG
500 TABLET ORAL EVERY 8 HOURS
Refills: 0 | Status: DISCONTINUED | OUTPATIENT
Start: 2022-10-06 | End: 2022-10-08

## 2022-10-06 RX ORDER — ATORVASTATIN CALCIUM 80 MG/1
10 TABLET, FILM COATED ORAL AT BEDTIME
Refills: 0 | Status: DISCONTINUED | OUTPATIENT
Start: 2022-10-06 | End: 2022-10-08

## 2022-10-06 RX ADMIN — ATORVASTATIN CALCIUM 10 MILLIGRAM(S): 80 TABLET, FILM COATED ORAL at 23:42

## 2022-10-06 RX ADMIN — Medication 975 MILLIGRAM(S): at 18:08

## 2022-10-06 RX ADMIN — Medication 5 MILLIGRAM(S): at 18:59

## 2022-10-06 RX ADMIN — Medication 100 MILLIGRAM(S): at 23:43

## 2022-10-06 RX ADMIN — SODIUM CHLORIDE 1200 MILLILITER(S): 9 INJECTION, SOLUTION INTRAVENOUS at 11:04

## 2022-10-06 RX ADMIN — ENOXAPARIN SODIUM 40 MILLIGRAM(S): 100 INJECTION SUBCUTANEOUS at 19:00

## 2022-10-06 RX ADMIN — Medication 975 MILLIGRAM(S): at 11:04

## 2022-10-06 NOTE — H&P ADULT - NSHPPHYSICALEXAM_GEN_ALL_CORE
PHYSICAL EXAMINATION:  GENERAL: NAD,   HEAD:  Atraumatic, Normocephalic  EYES:  conjunctiva and sclera clear  NECK: Supple, No JVD, Normal thyroid, right neck tenderness  CHEST/LUNG: Clear to auscultation. Clear to percussion bilaterally; No rales, rhonchi, wheezing, or rubs  HEART: Regular rate and rhythm; No murmurs, rubs, or gallops  ABDOMEN: Soft, Nontender, Nondistended; Bowel sounds present  NERVOUS SYSTEM:  Alert & Oriented X 1  EXTREMITIES:  2+ Peripheral Pulses, No clubbing, cyanosis, or edema  SKIN: warm dry

## 2022-10-06 NOTE — H&P ADULT - PROBLEM SELECTOR PLAN 6
IMPROVE VTE Individual Risk Assessment          RISK                                                          Points  [  ] Previous VTE                                                3  [  ] Thrombophilia                                             2  [  ] Lower limb paralysis                                   2        (unable to hold up >15 seconds)    [  ] Current Cancer                                             2         (within 6 months)  [x  ] Immobilization > 24 hrs                              1  [  ] ICU/CCU stay > 24 hours                             1  [x  ] Age > 60                                                         1    IMPROVE VTE Score:         [    2     ]      Will start Lovenox

## 2022-10-06 NOTE — ED ADULT NURSE NOTE - OBJECTIVE STATEMENT
pt is here for fever.  As per spouse, h/s dementia, feeling fever and chills since today, wheelchair bound, denied chest pain or sob, c/o chronic cogh

## 2022-10-06 NOTE — H&P ADULT - NSICDXPASTMEDICALHX_GEN_ALL_CORE_FT
PAST MEDICAL HISTORY:  Anemia     Asthma     Cholelithiasis     Compression fracture     Diverticulosis     Gastritis     Hiatal hernia     History of hypertension     ITP (idiopathic thrombocytopenic purpura)     Muscular dystrophy     Polymyositis on Prednisone    Pulmonary embolism in 11/2015, on Eliquis    Renal cyst, left     Type 2 diabetes mellitus without complication

## 2022-10-06 NOTE — H&P ADULT - HISTORY OF PRESENT ILLNESS
76 year old  w/ PMHx anemia, asthma, HTN, ITP, muscular dystrophy, polymyositis (prev on prednisone) PE (prev on Eliquis) , presents with increase in weakness. Patient states she returned from vacation from [place] two days ago and then this morning started to feel more tired and was having right neck pain. Patient also endorses having a hx of chronic cough, but now her cough is productive with pink/cream phlegm. Denies any fever, chills, nausea, vomiting headache, visual changes, chest pain, SOB, abdominal pain, diarrhea or constipation.  According to  at bedside, patient was recently taken off prednisone due to worsening dementia. For the past year, her dementia has been getting progressively worse, now A/O x1, but still able to ambulate with walker and cook and clean for herself.     In the ED:   76 year old  w/ PMHx anemia, asthma, HTN, ITP, muscular dystrophy, polymyositis (prev on prednisone) PE (prev on Eliquis) , presents with increase in weakness. Patient states she returned from vacation from [place] two days ago and then this morning started to feel more tired and was having right neck pain. Patient also endorses having a hx of chronic cough, but now her cough is productive with pink/cream phlegm. Denies any fever, chills, nausea, vomiting headache, visual changes, chest pain, SOB, abdominal pain, diarrhea or constipation.  According to  at bedside, patient was recently taken off prednisone due to worsening dementia. For the past year, her dementia has been getting progressively worse, now A/O x1, but still able to ambulate with walker and cook and clean for herself.     In the ED:  Temp 110.5, , /72, 97% on RA  WBC 17  CXR Slight increase left base interstitial markings  s/p Levaquin + 1.2L bolus  76 year old  w/ PMHx anemia, asthma, HTN, ITP, muscular dystrophy, polymyositis (prev on prednisone) PE (prev on Eliquis) , presents with increase in weakness. Patient states she returned from vacation from [place] two days ago and then this morning started to feel more tired and was having right neck pain. Patient also endorses having a hx of chronic cough, but now her cough is productive with pink/cream phlegm. Denies any fever, chills, nausea, vomiting headache, visual changes, chest pain, SOB, abdominal pain, diarrhea or constipation.  According to  at bedside, patient was recently taken off prednisone due to worsening dementia. For the past year, her dementia has been getting progressively worse, now A/O x1, but still able to ambulate with walker and cook and clean for herself.     In the ED:  Temp 100.5, , /72, 97% on RA  WBC 17  CXR Slight increase left base interstitial markings  s/p Levaquin + 1.2L bolus  77 year old  w/ PMHx anemia, asthma, HTN, ITP, muscular dystrophy, polymyositis (prev on prednisone) PE (prev on Eliquis) , presents with increase in weakness. Patient states she returned from vacation from [place] two days ago and then this morning started to feel more tired and was having right neck pain. Patient also endorses having a hx of chronic cough, but now her cough is productive with pink/cream phlegm. Denies any fever, chills, nausea, vomiting headache, visual changes, chest pain, SOB, abdominal pain, diarrhea or constipation.  According to  at bedside, patient was recently taken off prednisone due to worsening dementia. For the past year, her dementia has been getting progressively worse, now A/O x1, but still able to ambulate with walker and cook and clean for herself.     In the ED:  Temp 100.5, , /72, 97% on RA  WBC 17  CXR Slight increase left base interstitial markings  s/p Levaquin + 1.2L bolus

## 2022-10-06 NOTE — ED PROVIDER NOTE - NS ED ROS FT
General: +fever, chills  HEENT: Denies sensory changes, sore throat  Neck: Denies neck pain, neck stiffness  Resp: Denies coughing, SOB  Cardiovascular: Denies CP, palpitations, LE edema  GI: Denies nausea, vomiting, abdominal pain, diarrhea, constipation, blood in stool  : Denies dysuria, hematuria, frequency, incontinence  MSK: Denies back pain  Neuro: Denies HA, dizziness, numbness, +weakness  Skin: Denies rashes.

## 2022-10-06 NOTE — H&P ADULT - ASSESSMENT
[Age ___] : Age: [unfilled] [Healthy] : healthy [] :  [de-identified] : [place] descent per mother  76 year old  w/ PMHx anemia, asthma, HTN, ITP, muscular dystrophy, polymyositis (prev on prednisone) PE (prev on Eliquis) , presents with increase in weakness. Found to be septic, unknown source at this time.

## 2022-10-06 NOTE — H&P ADULT - PROBLEM SELECTOR PLAN 5
A/O x1-2 at baseline, able to cook and dress herself  Currently At baseline   On Namenda at home  c/w home meds

## 2022-10-06 NOTE — H&P ADULT - NSHPREVIEWOFSYSTEMS_GEN_ALL_CORE
REVIEW OF SYSTEMS:    CONSTITUTIONAL: + weakness, fevers no chills  EYES/ENT: No visual changes;  No vertigo or throat pain   NECK: No pain or stiffness  RESPIRATORY: + cough, no wheezing, hemoptysis; No shortness of breath  CARDIOVASCULAR: No chest pain or palpitations  GASTROINTESTINAL: No abdominal or epigastric pain. No nausea, vomiting, or hematemesis; No diarrhea or constipation. No melena or hematochezia.  GENITOURINARY: No dysuria, frequency or hematuria  NEUROLOGICAL: No numbness or weakness  SKIN: No itching, burning, rashes, or lesions   All other review of systems is negative unless indicated above.

## 2022-10-06 NOTE — H&P ADULT - PROBLEM SELECTOR PLAN 1
p/w right neck pain, and cough w/ pink/cream phlegm p/w right neck pain, and cough w/ pink/cream phlegm  100.5,   WBC 17  UA negative, COVID negative   CXR shows small opacities LLL  unknown cause of sepsis, will get CTA chest to evaluate for LLL opacities  s/p Levaquin, s/p 1.2 L  Will start XXXXX p/w right neck pain, and cough w/ pink/cream phlegm  100.5,   WBC 17  UA negative, COVID negative   CXR shows small opacities LLL  unknown cause of sepsis, will get CTA chest to evaluate for LLL opacities  s/p Levaquin, s/p 1.2 L  Will start Flagyl and Levaquin

## 2022-10-06 NOTE — ED PROVIDER NOTE - INTERPRETATION
I called the pt to reschedule his apt and the first available was 11/20/19 with Dr. Branch. The pt accepted that apt and I was able to schedule the apt.   
sinus tachycardia

## 2022-10-06 NOTE — ED PROVIDER NOTE - CLINICAL SUMMARY MEDICAL DECISION MAKING FREE TEXT BOX
76 y/o F hx of polymyositis, muscular dystrophy, DM, anemia, HTN presents w/ fever for past 1 day. endorses getting back from [place] 1 day ago. per son at bedside, states she has appeared weaker diffusely.  sepsis on arrival, tachycardic and febrile. empiric coverage w/ abx. fluid bolus 30 cc/kg. labs. rvp w/ covid for possible viral syndrome. cxr, ua/uc. tylenol for fever. will reassess s/p labs.

## 2022-10-06 NOTE — H&P ADULT - ATTENDING COMMENTS
76 year old  w/ PMHx anemia, asthma, HTN, ITP, muscular dystrophy, polymyositis (prev on prednisone) PE (prev on Eliquis) , presents with increase in weakness. Found to be septic, unknown source at this time. Patient has been off Prednisone for Patient seen and examined with PGY2 MATEUSZ Fry    76 year old  w/ PMHx anemia, asthma, HTN, ITP, muscular dystrophy, polymyositis (prev on prednisone) PE (prev on Eliquis) , presents with increase in weakness. Found to be septic, unknown source at this time. 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 Code sepsis; unclear source; COVID negative    Patient has some localized neck pain on left side but no visible redness or swelling    Vital Signs Last 24 Hrs  T(C): 37.1 (06 Oct 2022 22:50), Max: 38.1 (06 Oct 2022 09:38)  T(F): 98.8 (06 Oct 2022 22:50), Max: 100.5 (06 Oct 2022 09:38)  HR: 91 (06 Oct 2022 22:50) (87 - 115)  BP: 111/69 (06 Oct 2022 22:50) (108/64 - 117/72)  RR: 18 (06 Oct 2022 22:50) (18 - 18)  SpO2: 99% (06 Oct 2022 22:50) (97% - 99%)    P/E: as above; limited  mild hard lump like feeling left neck  No nuchal rigidity or photophobia  elderly female, oriented x 1-2  CVS: S1S2+, Regular  Resp: BLAE+, No wheeze or Rhonchi  GI: Soft, BS+  Extr; No edema or calf tenderness      Labs:                      10.7   17.77 )-----------( 317      ( 06 Oct 2022 11:00 )             33.1   10-06    143  |  111<H>  |  45<H>  ----------------------------<  95  3.8   |  22  |  0.64  Ca    9.2      06 Oct 2022 11:00    TPro  7.0  /  Alb  2.9<L>  /  TBili  0.4  /  DBili  x   /  AST  41<H>  /  ALT  25  /  AlkPhos  86  10-06    Xray Chest 1 View- PORTABLE-Urgent (10.06.22 @ 11:42)     Shallow inspiration crowds the chest. Heart magnified by technique. Slight increase left base interstitial markings compared to 2021.    IMPRESSION: As above.    Urinalysis Basic - ( 06 Oct 2022 12:45 )  Color: Yellow / Appearance: Clear / S.010 / pH: x  Gluc: x / Ketone: Negative  / Bili: Negative / Urobili: Negative   Blood: x / Protein: Negative / Nitrite: Negative   Leuk Esterase: Small / RBC: 0-2 /HPF / WBC 0-2 /HPF   Sq Epi: x / Non Sq Epi: Negative /HPF / Bacteria: Negative /HPF    Respiratory Viral Panel with COVID-19 by [facility] (10.06.22 @ 11:00)   Rapid RVP Result: NotDete   SARS-CoV-2: NotDetec    D/D:  Sepsis of unclear etiology suspected LLL PNA  ? Flare of Polymyositis  ??Vascular dementia     Plan:  Medicine  Blood Cx  Get CT Neck with contrast and Chest CT  Empirically cover for any aspiration given chronic cough with Levaquin and Flagyl; F/U CT Chest  Baclofen trial x one dose; if no improvement in neck pain can give Flexeril for possible Torticollis  Send ESR, CRP, Aldolase  Consider ID consult if leucocytosis persist or persistent fever  Hold Lasix, May continue Amlodipine with parameters  Rest as per MAR; d/w PGY2 DR. Fry Patient seen and examined with PGY2 MATEUSZ Fry    76 year old  w/ PMHx anemia, asthma, HTN, ITP, muscular dystrophy, polymyositis (prev on prednisone) PE (prev on Eliquis) , presents with increase in weakness. Found to be septic, unknown source at this time. 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 Code sepsis; unclear source; COVID negative    Patient has some localized neck pain on left side but no visible redness or swelling    Vital Signs Last 24 Hrs  T(C): 37.1 (06 Oct 2022 22:50), Max: 38.1 (06 Oct 2022 09:38)  T(F): 98.8 (06 Oct 2022 22:50), Max: 100.5 (06 Oct 2022 09:38)  HR: 91 (06 Oct 2022 22:50) (87 - 115)  BP: 111/69 (06 Oct 2022 22:50) (108/64 - 117/72)  RR: 18 (06 Oct 2022 22:50) (18 - 18)  SpO2: 99% (06 Oct 2022 22:50) (97% - 99%)    P/E: as above; limited  mild hard lump like feeling left neck  No nuchal rigidity or photophobia  elderly female, oriented x 1-2  CVS: S1S2+, Regular  Resp: BLAE+, No wheeze or Rhonchi  GI: Soft, BS+  Extr; No edema or calf tenderness      Labs:                      10.7   17.77 )-----------( 317      ( 06 Oct 2022 11:00 )             33.1   10-06    143  |  111<H>  |  45<H>  ----------------------------<  95  3.8   |  22  |  0.64  Ca    9.2      06 Oct 2022 11:00    TPro  7.0  /  Alb  2.9<L>  /  TBili  0.4  /  DBili  x   /  AST  41<H>  /  ALT  25  /  AlkPhos  86  10-06    Xray Chest 1 View- PORTABLE-Urgent (10.06.22 @ 11:42)     Shallow inspiration crowds the chest. Heart magnified by technique. Slight increase left base interstitial markings compared to 2021.    IMPRESSION: As above.    Urinalysis Basic - ( 06 Oct 2022 12:45 )  Color: Yellow / Appearance: Clear / S.010 / pH: x  Gluc: x / Ketone: Negative  / Bili: Negative / Urobili: Negative   Blood: x / Protein: Negative / Nitrite: Negative   Leuk Esterase: Small / RBC: 0-2 /HPF / WBC 0-2 /HPF   Sq Epi: x / Non Sq Epi: Negative /HPF / Bacteria: Negative /HPF    Respiratory Viral Panel with COVID-19 by [facility] (10.06.22 @ 11:00)   Rapid RVP Result: NotDetec   SARS-CoV-2: NotDetec    D/D:  Sepsis of unclear etiology suspected LLL PNA  ? Flare of Polymyositis  ??Vascular dementia     Plan:  Medicine  Blood Cx  Get CT Neck with contrast and Chest CT  Empirically cover for any aspiration given chronic cough with Levaquin and Flagyl; F/U CT Chest  Baclofen trial x one dose; if no improvement in neck pain can give Flexeril for possible Torticollis  Send ESR, CRP, Aldolase  Consider ID consult if leucocytosis persist or persistent fever  Hold Lasix, May continue Amlodipine with parameters  Will get a Folate, B12, Homocysteine, MMA and TSH with AM labs to rule out reversible causes of dementia as I am not sure if induced by Prednisone.   DVT ppx with Lovenox    Rest as per MAR; d/w PGY2 DR. Fry Patient seen and examined with PGY2 MATEUSZ Fry    77 year old  w/ PMHx anemia, asthma, HTN, ITP, muscular dystrophy, polymyositis (prev on prednisone) PE (prev on Eliquis) , presents with increase in weakness. Found to be septic, unknown source at this time. 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 Code sepsis; unclear source; COVID negative    Patient has some localized neck pain on left side but no visible redness or swelling    Vital Signs Last 24 Hrs  T(C): 37.1 (06 Oct 2022 22:50), Max: 38.1 (06 Oct 2022 09:38)  T(F): 98.8 (06 Oct 2022 22:50), Max: 100.5 (06 Oct 2022 09:38)  HR: 91 (06 Oct 2022 22:50) (87 - 115)  BP: 111/69 (06 Oct 2022 22:50) (108/64 - 117/72)  RR: 18 (06 Oct 2022 22:50) (18 - 18)  SpO2: 99% (06 Oct 2022 22:50) (97% - 99%)    P/E: as above; limited  mild hard lump like feeling left neck  No nuchal rigidity or photophobia  elderly female, oriented x 1-2  CVS: S1S2+, Regular  Resp: BLAE+, No wheeze or Rhonchi  GI: Soft, BS+  Extr; No edema or calf tenderness      Labs:                      10.7   17.77 )-----------( 317      ( 06 Oct 2022 11:00 )             33.1   10-06    143  |  111<H>  |  45<H>  ----------------------------<  95  3.8   |  22  |  0.64  Ca    9.2      06 Oct 2022 11:00    TPro  7.0  /  Alb  2.9<L>  /  TBili  0.4  /  DBili  x   /  AST  41<H>  /  ALT  25  /  AlkPhos  86  10-06    Xray Chest 1 View- PORTABLE-Urgent (10.06.22 @ 11:42)     Shallow inspiration crowds the chest. Heart magnified by technique. Slight increase left base interstitial markings compared to 2021.    IMPRESSION: As above.    Urinalysis Basic - ( 06 Oct 2022 12:45 )  Color: Yellow / Appearance: Clear / S.010 / pH: x  Gluc: x / Ketone: Negative  / Bili: Negative / Urobili: Negative   Blood: x / Protein: Negative / Nitrite: Negative   Leuk Esterase: Small / RBC: 0-2 /HPF / WBC 0-2 /HPF   Sq Epi: x / Non Sq Epi: Negative /HPF / Bacteria: Negative /HPF    Respiratory Viral Panel with COVID-19 by [facility] (10.06.22 @ 11:00)   Rapid RVP Result: NotDetec   SARS-CoV-2: NotDetec    D/D:  Sepsis of unclear etiology suspected LLL PNA  ? Flare of Polymyositis  ??Vascular dementia     Plan:  Medicine  Blood Cx  Get CT Neck with contrast and Chest CT  Empirically cover for any aspiration given chronic cough with Levaquin and Flagyl; F/U CT Chest  Baclofen trial x one dose; if no improvement in neck pain can give Flexeril for possible Torticollis  Send ESR, CRP, Aldolase  Consider ID consult if leucocytosis persist or persistent fever  Hold Lasix, May continue Amlodipine with parameters  Will get a Folate, B12, Homocysteine, MMA and TSH with AM labs to rule out reversible causes of dementia as I am not sure if induced by Prednisone.   DVT ppx with Lovenox    Rest as per MAR; d/w PGY2 DR. Fry

## 2022-10-06 NOTE — ED PROVIDER NOTE - PHYSICAL EXAMINATION
General: Well appearing female in no acute distress  HEENT: Normocephalic, atraumatic. Moist mucous membranes. Oropharynx clear. No lymphadenopathy.  Eyes: No scleral icterus. EOMI. JUVENAL.  Neck:. Soft and supple. Full ROM without pain. No midline tenderness  Cardiac: Regular rate and regular rhythm. No murmurs, rubs, gallops. Peripheral pulses 2+ and symmetric. No LE edema.  Resp: Lungs CTAB. Speaking in full sentences. No wheezes, rales or rhonchi.  Abd: Soft, non-tender, non-distended. No guarding or rebound. No scars, masses, or lesions.  Back: Spine midline and non-tender. No CVA tenderness.    Skin: No rashes, abrasions, or lacerations.  Neuro: AO x 3. Moves all extremities symmetrically. Motor strength and sensation grossly intact.

## 2022-10-06 NOTE — H&P ADULT - PROBLEM SELECTOR PLAN 3
pt takes amlodipine 10mg at home  c/w home meds  DASH diet pt takes amlodipine 10mg and lasix at home  Will hold lasix as pt looks dry  c/w amlodipine   DASH diet

## 2022-10-06 NOTE — H&P ADULT - PROBLEM SELECTOR PLAN 2
on chronic prednisone at home, recently was stopped and placed on Florinet on chronic prednisone at home, recently was stopped and placed on Florinet  c/o right neck pain, very tensed on physical exam  Will get CT neck to evaluate  f/u CPK, aldolase, ESR, CRP

## 2022-10-06 NOTE — ED PROVIDER NOTE - ATTENDING CONTRIBUTION TO CARE
Agree with resident H&P.  78 y/o female with PMHx of polymyositis, muscular dystrophy, DM, anemia, HTN presents w/ fever and weakness for past 1 day. No chest pain, no shortness of breath, no abdominal pain or urinary complaints.  pt febrile in ED and tachycardic.  code sepsis called.  Labs. xray, COVID, and anticipate admission.

## 2022-10-06 NOTE — ED PROVIDER NOTE - OBJECTIVE STATEMENT
76 y/o F hx of polymyositis, muscular dystrophy, DM, anemia, HTN presents w/ fever for past 1 day. endorses getting back from [place] 1 day ago. per son at bedside, states she has appeared weaker diffusely. endorsing cough as well but states that not different from her baseline. subjective fever/chills at home. denies chest pain/sob. denies abdominal pain. denies dysuria. denies changes in bowel movements. denies trauma.

## 2022-10-07 LAB
24R-OH-CALCIDIOL SERPL-MCNC: 79 NG/ML — SIGNIFICANT CHANGE UP (ref 30–80)
ANION GAP SERPL CALC-SCNC: 11 MMOL/L — SIGNIFICANT CHANGE UP (ref 5–17)
BASOPHILS # BLD AUTO: 0.03 K/UL — SIGNIFICANT CHANGE UP (ref 0–0.2)
BASOPHILS NFR BLD AUTO: 0.2 % — SIGNIFICANT CHANGE UP (ref 0–2)
BUN SERPL-MCNC: 18 MG/DL — SIGNIFICANT CHANGE UP (ref 7–18)
CALCIUM SERPL-MCNC: 8.8 MG/DL — SIGNIFICANT CHANGE UP (ref 8.4–10.5)
CHLORIDE SERPL-SCNC: 115 MMOL/L — HIGH (ref 96–108)
CO2 SERPL-SCNC: 20 MMOL/L — LOW (ref 22–31)
CREAT SERPL-MCNC: 0.5 MG/DL — SIGNIFICANT CHANGE UP (ref 0.5–1.3)
CRP SERPL-MCNC: 81 MG/L — HIGH
EGFR: 98 ML/MIN/1.73M2 — SIGNIFICANT CHANGE UP
EOSINOPHIL # BLD AUTO: 0.14 K/UL — SIGNIFICANT CHANGE UP (ref 0–0.5)
EOSINOPHIL NFR BLD AUTO: 1 % — SIGNIFICANT CHANGE UP (ref 0–6)
ERYTHROCYTE [SEDIMENTATION RATE] IN BLOOD: 36 MM/HR — HIGH (ref 0–20)
FOLATE SERPL-MCNC: >20 NG/ML — SIGNIFICANT CHANGE UP
GLUCOSE SERPL-MCNC: 71 MG/DL — SIGNIFICANT CHANGE UP (ref 70–99)
HCT VFR BLD CALC: 31 % — LOW (ref 34.5–45)
HCYS SERPL-MCNC: 4.7 UMOL/L — SIGNIFICANT CHANGE UP
HGB BLD-MCNC: 10 G/DL — LOW (ref 11.5–15.5)
IMM GRANULOCYTES NFR BLD AUTO: 0.4 % — SIGNIFICANT CHANGE UP (ref 0–0.9)
LYMPHOCYTES # BLD AUTO: 1.79 K/UL — SIGNIFICANT CHANGE UP (ref 1–3.3)
LYMPHOCYTES # BLD AUTO: 13.1 % — SIGNIFICANT CHANGE UP (ref 13–44)
MAGNESIUM SERPL-MCNC: 2.1 MG/DL — SIGNIFICANT CHANGE UP (ref 1.6–2.6)
MCHC RBC-ENTMCNC: 30.4 PG — SIGNIFICANT CHANGE UP (ref 27–34)
MCHC RBC-ENTMCNC: 32.3 GM/DL — SIGNIFICANT CHANGE UP (ref 32–36)
MCV RBC AUTO: 94.2 FL — SIGNIFICANT CHANGE UP (ref 80–100)
MONOCYTES # BLD AUTO: 0.84 K/UL — SIGNIFICANT CHANGE UP (ref 0–0.9)
MONOCYTES NFR BLD AUTO: 6.1 % — SIGNIFICANT CHANGE UP (ref 2–14)
NEUTROPHILS # BLD AUTO: 10.82 K/UL — HIGH (ref 1.8–7.4)
NEUTROPHILS NFR BLD AUTO: 79.2 % — HIGH (ref 43–77)
NRBC # BLD: 0 /100 WBCS — SIGNIFICANT CHANGE UP (ref 0–0)
PHOSPHATE SERPL-MCNC: 2.4 MG/DL — LOW (ref 2.5–4.5)
PLATELET # BLD AUTO: 308 K/UL — SIGNIFICANT CHANGE UP (ref 150–400)
POTASSIUM SERPL-MCNC: 3.1 MMOL/L — LOW (ref 3.5–5.3)
POTASSIUM SERPL-SCNC: 3.1 MMOL/L — LOW (ref 3.5–5.3)
RBC # BLD: 3.29 M/UL — LOW (ref 3.8–5.2)
RBC # FLD: 17.2 % — HIGH (ref 10.3–14.5)
SODIUM SERPL-SCNC: 146 MMOL/L — HIGH (ref 135–145)
TSH SERPL-MCNC: 2.55 UU/ML — SIGNIFICANT CHANGE UP (ref 0.34–4.82)
VIT B12 SERPL-MCNC: 968 PG/ML — SIGNIFICANT CHANGE UP (ref 232–1245)
WBC # BLD: 13.68 K/UL — HIGH (ref 3.8–10.5)
WBC # FLD AUTO: 13.68 K/UL — HIGH (ref 3.8–10.5)

## 2022-10-07 PROCEDURE — 99233 SBSQ HOSP IP/OBS HIGH 50: CPT | Mod: GC

## 2022-10-07 RX ORDER — POTASSIUM CHLORIDE 20 MEQ
40 PACKET (EA) ORAL ONCE
Refills: 0 | Status: COMPLETED | OUTPATIENT
Start: 2022-10-07 | End: 2022-10-07

## 2022-10-07 RX ADMIN — Medication 40 MILLIEQUIVALENT(S): at 18:01

## 2022-10-07 RX ADMIN — ATORVASTATIN CALCIUM 10 MILLIGRAM(S): 80 TABLET, FILM COATED ORAL at 21:41

## 2022-10-07 RX ADMIN — MEMANTINE HYDROCHLORIDE 5 MILLIGRAM(S): 10 TABLET ORAL at 05:53

## 2022-10-07 RX ADMIN — FLUDROCORTISONE ACETATE 0.1 MILLIGRAM(S): 0.1 TABLET ORAL at 05:52

## 2022-10-07 RX ADMIN — Medication 100 MILLIGRAM(S): at 21:41

## 2022-10-07 RX ADMIN — Medication 100 MILLIGRAM(S): at 05:53

## 2022-10-07 RX ADMIN — MEMANTINE HYDROCHLORIDE 5 MILLIGRAM(S): 10 TABLET ORAL at 18:01

## 2022-10-07 RX ADMIN — Medication 100 MILLIGRAM(S): at 14:35

## 2022-10-07 RX ADMIN — AMLODIPINE BESYLATE 10 MILLIGRAM(S): 2.5 TABLET ORAL at 05:52

## 2022-10-07 RX ADMIN — ENOXAPARIN SODIUM 40 MILLIGRAM(S): 100 INJECTION SUBCUTANEOUS at 18:00

## 2022-10-07 NOTE — PHARMACOTHERAPY INTERVENTION NOTE - COMMENTS
As per policy, dose adjusted levofloxacin from 500mg PO q24h to levofloxacin 750mg PO q24h in the setting of being utilized empirically for pneumonia.  As per policy, dose adjusted levofloxacin from 500mg IV q24h to levofloxacin 750mg IV q24h in the setting of being utilized empirically for pneumonia.  As per policy, dose adjusted levofloxacin from 500mg IV q24h to levofloxacin 750mg IV q24h in the setting of being utilized empirically for pneumonia.     Desean Issa, PharmD  Clinical Pharmacy Specialist, Infectious Diseases  Tele-[facility]tim[facility] Stewardship Program (Tele-[facility])  Tele-[facility] Phone: [phone]

## 2022-10-07 NOTE — CONSULT NOTE ADULT - GASTROINTESTINAL
soft/nontender/nondistended/normal active bowel sounds/no guarding/no rigidity/no organomegaly/no masses palpable details…

## 2022-10-07 NOTE — CONSULT NOTE ADULT - SUBJECTIVE AND OBJECTIVE BOX
HPI:  76 year old  w/ PMHx anemia, asthma, HTN, ITP, muscular dystrophy, polymyositis (prev on prednisone) PE (prev on Eliquis) , presents with increase in weakness. Patient states she returned from vacation from [place] two days ago and then this morning started to feel more tired and was having right neck pain. Patient also endorses having a hx of chronic cough, but now her cough is productive with pink/cream phlegm. Denies any fever, chills, nausea, vomiting headache, visual changes, chest pain, SOB, abdominal pain, diarrhea or constipation.  According to  at bedside, patient was recently taken off prednisone due to worsening dementia. For the past year, her dementia has been getting progressively worse, now A/O x1, but still able to ambulate with walker and cook and clean for herself.     In the ED:  Temp 100.5, , /72, 97% on RA  WBC 17  CXR Slight increase left base interstitial markings  s/p Levaquin + 1.2L bolus  (06 Oct 2022 15:08)      PAST MEDICAL & SURGICAL HISTORY:  Asthma      History of hypertension      ITP (idiopathic thrombocytopenic purpura)      Type 2 diabetes mellitus without complication      Gastritis      Compression fracture      Pulmonary embolism  in 2015, on Eliquis      Polymyositis  on Prednisone      Anemia      Diverticulosis      Cholelithiasis      Renal cyst, left      Hiatal hernia      Muscular dystrophy      H/O splenectomy          Keflex (Rash)      Meds:  amLODIPine   Tablet 10 milliGRAM(s) Oral daily  atorvastatin 10 milliGRAM(s) Oral at bedtime  enoxaparin Injectable 40 milliGRAM(s) SubCutaneous every 24 hours  fludroCORTISONE 0.1 milliGRAM(s) Oral daily  levoFLOXacin IVPB 500 milliGRAM(s) IV Intermittent every 24 hours  memantine 5 milliGRAM(s) Oral two times a day  metroNIDAZOLE  IVPB 500 milliGRAM(s) IV Intermittent every 8 hours  potassium chloride   Powder 40 milliEquivalent(s) Oral once  sodium chloride 0.9%. 1000 milliLiter(s) IV Continuous <Continuous>      SOCIAL HISTORY:  Smoker:  YES / NO        PACK YEARS:                         WHEN QUIT?  ETOH use:  YES / NO               FREQUENCY / QUANTITY:  [employer] Drug use:  YES / NO  Occupation:  Assisted device use (Cane / Walker):  Live with:    FAMILY HISTORY:  Family history of diabetes mellitus        VITALS:  Vital Signs Last 24 Hrs  T(C): 36.6 (07 Oct 2022 13:14), Max: 37.1 (06 Oct 2022 22:50)  T(F): 97.9 (07 Oct 2022 13:14), Max: 98.8 (06 Oct 2022 22:50)  HR: 99 (07 Oct 2022 13:14) (87 - 99)  BP: 117/70 (07 Oct 2022 13:14) (108/64 - 117/70)  BP(mean): --  RR: 18 (07 Oct 2022 13:14) (17 - 18)  SpO2: 98% (07 Oct 2022 13:14) (96% - 99%)    Parameters below as of 07 Oct 2022 13:14  Patient On (Oxygen Delivery Method): room air        LABS/DIAGNOSTIC TESTS:                          10.0   13.68 )-----------( 308      ( 07 Oct 2022 06:33 )             31.0     WBC Count: 13.68 K/uL (10-07 @ 06:33)  WBC Count: 17.77 K/uL (10-06 @ 11:00)      10-07    146<H>  |  115<H>  |  18  ----------------------------<  71  3.1<L>   |  20<L>  |  0.50    Ca    8.8      07 Oct 2022 06:33  Phos  2.4     10-07  Mg     2.1     10-    TPro  7.0  /  Alb  2.9<L>  /  TBili  0.4  /  DBili  x   /  AST  41<H>  /  ALT  25  /  AlkPhos  86  10-      Urinalysis Basic - ( 06 Oct 2022 12:45 )    Color: Yellow / Appearance: Clear / S.010 / pH: x  Gluc: x / Ketone: Negative  / Bili: Negative / Urobili: Negative   Blood: x / Protein: Negative / Nitrite: Negative   Leuk Esterase: Small / RBC: 0-2 /HPF / WBC 0-2 /HPF   Sq Epi: x / Non Sq Epi: Negative /HPF / Bacteria: Negative /HPF        LIVER FUNCTIONS - ( 06 Oct 2022 11:00 )  Alb: 2.9 g/dL / Pro: 7.0 g/dL / ALK PHOS: 86 U/L / ALT: 25 U/L DA / AST: 41 U/L / GGT: x             PT/INR - ( 06 Oct 2022 11:00 )   PT: 13.9 sec;   INR: 1.17 ratio         PTT - ( 06 Oct 2022 11:00 )  PTT:32.5 sec    LACTATE:    ABG -     CULTURES:       RADIOLOGY:< from: CT Neck Soft Tissue w/ IV Cont (10.06.22 @ 21:01) >  ACC: [id number] EXAM:  CT NECK SOFT TISSUE IC                          PROCEDURE DATE:  10/06/2022          INTERPRETATION:  CT EXAMINATION OF THE NECK    CLINICAL INDICATION: Neck pain.    TECHNIQUE: Multidetector reformatted CT images of the neck were obtained   after contrast administration. Omnipaque 350 IV contrast dose: 60 cc   administered.    COMPARISON: CT neck 2020.    FINDINGS:  Aerodigestive structures: No evidence of mass or abnormal enhancement.    Lymph nodes: No pathologic adenopathy by imaging size criteria.    Parotid and submandibular glands:  There is right submandibular gland   ductal dilatation, decreased in prominence since comparison study.   Previously described sialoliths are not currently visualized.    Left submandibular gland is unremarkable. The bilateral parotid glands   are unremarkable.    Thyroid gland: Normal.    Vascular structures: Atherosclerotic changes are noted bilaterally.    Osseous structures: Interval development of a mild T4 compression   fracture, age-indeterminate. Chronic mild C7 and severe T7 compression   fractures are again seen.    Paranasal sinuses: Clear.  Mastoid air cells:  Clear.    Partially visualized orbits: Bilateral cataract surgery. Orbits are   otherwise unremarkable.    Partially visualized intracranial structures: Interval development of a   chronic infarct in the right occipital lobe/posterior of medial temporal   lobe. 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    Partially visualized lung apices: Please see concurrent dedicated CT   chest.    IMPRESSION:  -No mass or abnormal enhancement is identified.  -Interval development of age indeterminate mild T4 compression fracture.  -Interval development of right PCA territory chronic infarct.  -Right submandibular gland ductal dilatation, decreased in prominence   since comparison study. Previously described sialoliths are not currently   visualized.    --- End of Report ---            ISHA MURRAY MD; Attending Radiologist  Thisdocument has been electronically signed. Oct  7 2022 12:43PM    < end of copied text >    ---------------------------------------------------------------------------------------------------------------------------------------------------------------------------------------------------------  ACC: [id number] EXAM:  CT CHEST IC                          PROCEDURE DATE:  10/06/2022          INTERPRETATION:  INDICATION: Productive cough, fever    TECHNIQUE: A volumetric CT acquisition of the chest was obtained from the   thoracic inlet to theupper abdomen, following the administration of 30cc   intravenous contrast. Coronal and sagittal reconstructed images are   provided.    COMPARISON: Chest x-ray 10/6/2022 and CT chest 2015    FINDINGS:    Lungs/Airways/Pleura: The airways are patent. There are mild nodular   groundglass opacities in the right upper and middle lobes. Mild linear   bibasilar atelectasis. Unchanged 3 mm nodule (series 3:82) in the left   lower lobe when compared to CT chest on 2015    Mediastinum/Lymph nodes: No thoracic adenopathy.  Heart and Vessels: There is a two-vessel (bovine) left aortic arch, with   a common origin of the brachiocephalic and left common carotid arteries   (this is a normal anatomic variant).  Calcifications of the aorta. There is cardiomegaly. Calcified mitral   valve leaflet. There are coronary artery calcifications. No pericardial   effusion. Calcifications at the left carotid bifurcation.    Upper Abdomen: Bilateral renal cysts. Hiatal hernia.    Osseous structures andSoft Tissues: No aggressive bone lesions. Severe   compression deformity of the T7 vertebral body, moderate compression   deformity of the T9 vertebral body and mild compression deformity of the   T11-12 vertebral bodies, new since CT chest 2015. Stable   compression deformities of the L2 and L3 vertebral bodies. Multilevel   degenerative changes of the spine. There is hyperkyphosis. Chronic,   healing fractures of the left 6-11th ribs.    IMPRESSION:  Nodular groundglass opacities in the right upper and middle lobes are   likely infectious/inflammatory in etiology. Follow-up CT chest is   suggested 4-6 weeks after treatment to assess for resolution.    Several new vertebral body compression fractures since CT chest 2015, including a severe compression deformity of the T7 vertebral   body, and moderate compression deformity of the T9 vertebral body.    Notification to clinician of alert:  Provider Thom Thorne MD was notified about the impression on   10/7/2022 3:33 PM via secure email by Dr. Oneil Johnson. Readback   confirmation was obtained.    --- End of Report ---           EDSON ANDRES MD; Resident Radiologist  This document has been electronically signed.   ONEIL JOHNSON DO; Attending Radiologist  This document has been electronically signed. Oct  7 2022  3:34PM    < end of copied text >      ROS  [  ] UNABLE TO ELICIT               HPI:  76 year old  w/ PMHx anemia, asthma, HTN, ITP, muscular dystrophy, polymyositis (prev on prednisone) PE (prev on Eliquis) , presents with increase in weakness. Patient states she returned from vacation from [place] two days ago and then this morning started to feel more tired and was having right neck pain. Patient also endorses having a hx of chronic cough, but now her cough is productive with pink/cream phlegm. Denies any fever, chills, nausea, vomiting headache, visual changes, chest pain, SOB, abdominal pain, diarrhea or constipation.  According to  at bedside, patient was recently taken off prednisone due to worsening dementia. For the past year, her dementia has been getting progressively worse, now A/O x1, but still able to ambulate with walker and cook and clean for herself.     In the ED:  Temp 100.5, , /72, 97% on RA  WBC 17  CXR Slight increase left base interstitial markings  s/p Levaquin + 1.2L bolus  (06 Oct 2022 15:08)      History as above, asked to see this patient who had just returned 2 days ago from a vacation in [place] and brought to the hospital for a cough which is more than her usual chronic cough, she has some sputum production with it but denies being SOB or having any pleuritic chest pain, she has some chills at home but no fevers, she was found to have a low grade fever here of 100.5. She also c/o some right sided neck pain but no sore throat or other complaints. The patient is answering questions appropriately for the most part despite having a H/O dementia. She does state that she had some coughing after eating but not sure that is actually accurate as her  refutes this. Her CT chest shows right sided infiltrates, mainly ground glass.      PAST MEDICAL & SURGICAL HISTORY:  Asthma      History of hypertension      ITP (idiopathic thrombocytopenic purpura)      Type 2 diabetes mellitus without complication      Gastritis      Compression fracture      Pulmonary embolism  in 2015, on Eliquis      Polymyositis  on Prednisone      Anemia      Diverticulosis      Cholelithiasis      Renal cyst, left      Hiatal hernia      Muscular dystrophy      H/O splenectomy          Keflex (Rash)      Meds:  amLODIPine   Tablet 10 milliGRAM(s) Oral daily  atorvastatin 10 milliGRAM(s) Oral at bedtime  enoxaparin Injectable 40 milliGRAM(s) SubCutaneous every 24 hours  fludroCORTISONE 0.1 milliGRAM(s) Oral daily  levoFLOXacin IVPB 500 milliGRAM(s) IV Intermittent every 24 hours  memantine 5 milliGRAM(s) Oral two times a day  metroNIDAZOLE  IVPB 500 milliGRAM(s) IV Intermittent every 8 hours  potassium chloride   Powder 40 milliEquivalent(s) Oral once  sodium chloride 0.9%. 1000 milliLiter(s) IV Continuous <Continuous>      SOCIAL HISTORY:  Smoker:  no  ETOH use:  no      FAMILY HISTORY:  Family history of diabetes mellitus        VITALS:  Vital Signs Last 24 Hrs  T(C): 36.6 (07 Oct 2022 13:14), Max: 37.1 (06 Oct 2022 22:50)  T(F): 97.9 (07 Oct 2022 13:14), Max: 98.8 (06 Oct 2022 22:50)  HR: 99 (07 Oct 2022 13:14) (87 - 99)  BP: 117/70 (07 Oct 2022 13:14) (108/64 - 117/70)  BP(mean): --  RR: 18 (07 Oct 2022 13:14) (17 - 18)  SpO2: 98% (07 Oct 2022 13:14) (96% - 99%)    Parameters below as of 07 Oct 2022 13:14  Patient On (Oxygen Delivery Method): room air        LABS/DIAGNOSTIC TESTS:                          10.0   13.68 )-----------( 308      ( 07 Oct 2022 06:33 )             31.0     WBC Count: 13.68 K/uL (10-07 @ 06:33)  WBC Count: 17.77 K/uL (10-06 @ 11:00)      10-07    146<H>  |  115<H>  |  18  ----------------------------<  71  3.1<L>   |  20<L>  |  0.50    Ca    8.8      07 Oct 2022 06:33  Phos  2.4     10-07  Mg     2.1     10-07    TPro  7.0  /  Alb  2.9<L>  /  TBili  0.4  /  DBili  x   /  AST  41<H>  /  ALT  25  /  AlkPhos  86  10-06      Urinalysis Basic - ( 06 Oct 2022 12:45 )    Color: Yellow / Appearance: Clear / S.010 / pH: x  Gluc: x / Ketone: Negative  / Bili: Negative / Urobili: Negative   Blood: x / Protein: Negative / Nitrite: Negative   Leuk Esterase: Small / RBC: 0-2 /HPF / WBC 0-2 /HPF   Sq Epi: x / Non Sq Epi: Negative /HPF / Bacteria: Negative /HPF        LIVER FUNCTIONS - ( 06 Oct 2022 11:00 )  Alb: 2.9 g/dL / Pro: 7.0 g/dL / ALK PHOS: 86 U/L / ALT: 25 U/L DA / AST: 41 U/L / GGT: x             PT/INR - ( 06 Oct 2022 11:00 )   PT: 13.9 sec;   INR: 1.17 ratio         PTT - ( 06 Oct 2022 11:00 )  PTT:32.5 sec    LACTATE:    ABG -     CULTURES:       RADIOLOGY:< from: CT Neck Soft Tissue w/ IV Cont (10.06.22 @ 21:01) >  ACC: [id number] EXAM:  CT NECK SOFT TISSUE IC                          PROCEDURE DATE:  10/06/2022          INTERPRETATION:  CT EXAMINATION OF THE NECK    CLINICAL INDICATION: Neck pain.    TECHNIQUE: Multidetector reformatted CT images of the neck were obtained   after contrast administration. Omnipaque 350 IV contrast dose: 60 cc   administered.    COMPARISON: CT neck 2020.    FINDINGS:  Aerodigestive structures: No evidence of mass or abnormal enhancement.    Lymph nodes: No pathologic adenopathy by imaging size criteria.    Parotid and submandibular glands:  There is right submandibular gland   ductal dilatation, decreased in prominence since comparison study.   Previously described sialoliths are not currently visualized.    Left submandibular gland is unremarkable. The bilateral parotid glands   are unremarkable.    Thyroid gland: Normal.    Vascular structures: Atherosclerotic changes are noted bilaterally.    Osseous structures: Interval development of a mild T4 compression   fracture, age-indeterminate. Chronic mild C7 and severe T7 compression   fractures are again seen.    Paranasal sinuses: Clear.  Mastoid air cells:  Clear.    Partially visualized orbits: Bilateral cataract surgery. Orbits are   otherwise unremarkable.    Partially visualized intracranial structures: Interval development of a   chronic infarct in the right occipital lobe/posterior of medial temporal   lobe. 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    Partially visualized lung apices: Please see concurrent dedicated CT   chest.    IMPRESSION:  -No mass or abnormal enhancement is identified.  -Interval development of age indeterminate mild T4 compression fracture.  -Interval development of right PCA territory chronic infarct.  -Right submandibular gland ductal dilatation, decreased in prominence   since comparison study. Previously described sialoliths are not currently   visualized.    --- End of Report ---            ISHA MURRAY MD; Attending Radiologist  Thisdocument has been electronically signed. Oct  7 2022 12:43PM    < end of copied text >    ---------------------------------------------------------------------------------------------------------------------------------------------------------------------------------------------------------  ACC: [id number] EXAM:  CT CHEST IC                          PROCEDURE DATE:  10/06/2022          INTERPRETATION:  INDICATION: Productive cough, fever    TECHNIQUE: A volumetric CT acquisition of the chest was obtained from the   thoracic inlet to theupper abdomen, following the administration of 30cc   intravenous contrast. Coronal and sagittal reconstructed images are   provided.    COMPARISON: Chest x-ray 10/6/2022 and CT chest 2015    FINDINGS:    Lungs/Airways/Pleura: The airways are patent. There are mild nodular   groundglass opacities in the right upper and middle lobes. Mild linear   bibasilar atelectasis. Unchanged 3 mm nodule (series 3:82) in the left   lower lobe when compared to CT chest on 2015    Mediastinum/Lymph nodes: No thoracic adenopathy.  Heart and Vessels: There is a two-vessel (bovine) left aortic arch, with   a common origin of the brachiocephalic and left common carotid arteries   (this is a normal anatomic variant).  Calcifications of the aorta. There is cardiomegaly. Calcified mitral   valve leaflet. There are coronary artery calcifications. No pericardial   effusion. Calcifications at the left carotid bifurcation.    Upper Abdomen: Bilateral renal cysts. Hiatal hernia.    Osseous structures andSoft Tissues: No aggressive bone lesions. Severe   compression deformity of the T7 vertebral body, moderate compression   deformity of the T9 vertebral body and mild compression deformity of the   T11-12 vertebral bodies, new since CT chest 2015. Stable   compression deformities of the L2 and L3 vertebral bodies. Multilevel   degenerative changes of the spine. There is hyperkyphosis. Chronic,   healing fractures of the left 6-11th ribs.    IMPRESSION:  Nodular groundglass opacities in the right upper and middle lobes are   likely infectious/inflammatory in etiology. Follow-up CT chest is   suggested 4-6 weeks after treatment to assess for resolution.    Several new vertebral body compression fractures since CT chest 2015, including a severe compression deformity of the T7 vertebral   body, and moderate compression deformity of the T9 vertebral body.    Notification to clinician of alert:  Provider Thom Thorne MD was notified about the impression on   10/7/2022 3:33 PM via secure email by Dr. Oneil Johnson. Readback   confirmation was obtained.    --- End of Report ---           EDSON ANDRES MD; Resident Radiologist  This document has been electronically signed.   ONEIL JOHNSON DO; Attending Radiologist  This document has been electronically signed. Oct  7 2022  3:34PM    < end of copied text >      ROS  [  ] UNABLE TO ELICIT

## 2022-10-07 NOTE — CONSULT NOTE ADULT - ASSESSMENT
Pneumonia - likely CAP and less likely to be aspiration   Fever - low grade  Leukocytosis - decreasing      Plan - Cont Levaquin 750mgs iv q24hrs for today and tomorrow  Cont Flagyl 500mgs q8hrs for now  If stable and doing well tomorrow then can plan to DC home on Levaquin 500mgs po qd x 5-6 days more ( given she is very thin)  would likely DC Flagyl after tomorrow.  If patient is not clinically ready for DC home tomorrow then  will have to sign out AMA.

## 2022-10-07 NOTE — PROGRESS NOTE ADULT - ATTENDING COMMENTS
Patient seen and examined with PGY1 Dr. Flood    76 year old Female with PMH anemia, asthma, HTN, ITP, muscular dystrophy, polymyositis (prev on prednisone stopped due to ??Dementia) PE (prev on Eliquis completed treatment) , presents with increase in weakness. Found to be septic in ED with elevated BC and fever, unknown source at this time. Patient has been off Prednisone for sometime but noted to be on Florinef    Patient is unable to provide clear details  about her meds or PMH but oriented, able to follow commands; Patient has some localized neck pain on left side but no visible redness or swelling.     No new complaints. No fever overnight. Patient denies fever, chills, SOB, palpitations, chest pain, nausea, vomiting, diarrhea, constipation, dizziness    Vital Signs Last 24 Hrs  T(C): 36.6 (07 Oct 2022 13:14), Max: 37.1 (06 Oct 2022 22:50)  T(F): 97.9 (07 Oct 2022 13:14), Max: 98.8 (06 Oct 2022 22:50)  HR: 99 (07 Oct 2022 13:14) (87 - 99)  BP: 117/70 (07 Oct 2022 13:14) (108/64 - 117/70)  RR: 18 (07 Oct 2022 13:14) (17 - 18)  SpO2: 98% (07 Oct 2022 13:14) (96% - 99%) room air    P/E: as above; limited  mild lump like feeling left neck, tebder  No nuchal rigidity or photophobia  elderly female, oriented x 1-2  CVS: S1S2+, Regular  Resp: BLAE+, No wheeze or Rhonchi  GI: Soft, BS+,   Extr; No edema or calf tenderness      Labs:                          10.0   13.68 )-----------( 308      ( 07 Oct 2022 06:33 )             31.0   10-07  146<H>  |  115<H>  |  18  ----------------------------<  71  3.1<L>   |  20<L>  |  0.50  Ca    8.8      07 Oct 2022 06:33  Phos  2.4     10-07  Mg     2.1     10-07    TPro  7.0  /  Alb  2.9<L>  /  TBili  0.4  /  DBili  x   /  AST  41<H>  /  ALT  25  /  AlkPhos  86  10-06    Xray Chest 1 View- PORTABLE-Urgent (10.06.22 @ 11:42)   Shallow inspiration crowds the chest. Heart magnified by technique. Slight increase left base interstitial markings compared to 2021.      Urinalysis Basic - ( 06 Oct 2022 12:45 )  Color: Yellow / Appearance: Clear / S.010 / pH: x  Gluc: x / Ketone: Negative  / Bili: Negative / Urobili: Negative   Blood: x / Protein: Negative / Nitrite: Negative   Leuk Esterase: Small / RBC: 0-2 /HPF / WBC 0-2 /HPF   Sq Epi: x / Non Sq Epi: Negative /HPF / Bacteria: Negative /HPF    Respiratory Viral Panel with COVID-19 by [facility] (10.06.22 @ 11:00)   Rapid RVP Result: [facility]   SARS-CoV-2: NotDete    D/D:  Sepsis of unclear etiology suspected LLL PNA  ? Flare of Polymyositis  ??Vascular dementia     Plan:  Await CT Chest report; CT Neck with submandibular sialolith   Follow up Blood Cx and Urine Cx    Empirically cover for any aspiration given chronic cough with Levaquin and Flagyl; F/U CT Chest  Baclofen trial x one dose; if no improvement in neck pain can give Flexeril for possible Torticollis  Send ESR, CRP, Aldolase  Consider ID consult if leucocytosis persist or persistent fever  Hold Lasix, May continue Amlodipine with parameters  Will get a Folate, B12, Homocysteine, MMA and TSH with AM labs to rule out reversible causes of dementia as I am not sure if induced by Prednisone.   DVT ppx with Lovenox    Rest as per MAR; d/w PGY2 DR. Fry Patient seen and examined with PGY1 Dr. Flood    76 year old Female with PMH anemia, asthma, HTN, ITP, muscular dystrophy, polymyositis (prev on prednisone stopped due to ??Dementia) PE (prev on Eliquis completed treatment) , presents with increase in weakness. Found to be septic in ED with elevated BC and fever, unknown source at this time. Patient has been off Prednisone for sometime but noted to be on Florinef    Patient is unable to provide clear details  about her meds or PMH but oriented, able to follow commands; Patient has some localized neck pain on left side but no visible redness or swelling.     No new complaints. No fever overnight. Patient denies fever, chills, SOB, palpitations, chest pain, nausea, vomiting, diarrhea, constipation, dizziness    Vital Signs Last 24 Hrs  T(C): 36.6 (07 Oct 2022 13:14), Max: 37.1 (06 Oct 2022 22:50)  T(F): 97.9 (07 Oct 2022 13:14), Max: 98.8 (06 Oct 2022 22:50)  HR: 99 (07 Oct 2022 13:14) (87 - 99)  BP: 117/70 (07 Oct 2022 13:14) (108/64 - 117/70)  RR: 18 (07 Oct 2022 13:14) (17 - 18)  SpO2: 98% (07 Oct 2022 13:14) (96% - 99%) room air    P/E: as above; limited  mild lump like feeling left neck, mildly tender  No nuchal rigidity or photophobia  elderly female, oriented x 1-2 at baseline  CVS: S1S2+, Regular  Resp: BLAE+, No wheeze or Rhonchi  GI: Soft, BS+,   Extr; No edema or calf tenderness    Labs:                          10.0   13.68 )-----------( 308      ( 07 Oct 2022 06:33 )             31.0   10-07  146<H>  |  115<H>  |  18  ----------------------------<  71  3.1<L>   |  20<L>  |  0.50  Ca    8.8      07 Oct 2022 06:33  Phos  2.4     10-07  Mg     2.1     10-07  TPro  7.0  /  Alb  2.9<L>  /  TBili  0.4  /  DBili  x   /  AST  41<H>  /  ALT  25  /  AlkPhos  86  10-06    Thyroid Stimulating Hormone, Serum (10.07.22 @ 06:33) Thyroid Stimulating Hormone, Serum: 2.55 uU/mL     Respiratory Viral Panel with COVID-19 by [facility] (10.06.22 @ 11:00)   Rapid RVP Result: [facility] SARS-CoV-2: [facility]    Culture - Blood (10.06.22 @ 11:00) Specimen Source: .Blood Blood-Peripheral Culture Results: No growth to date.  Culture - Blood (10.06.22 @ 11:00) Specimen Source: .Blood Blood-Peripheral Culture Results: No growth to date.     CT Neck Soft Tissue w/ IV Cont (10.06.22 @ 21:01)   IMPRESSION:  -No mass or abnormal enhancement is identified.  -Interval development of age indeterminate mild T4 compression fracture.  -Interval development of right PCA territory chronic infarct.  -Right submandibular gland ductal dilatation, decreased in prominence since comparison study. Previously described sialoliths are not currently   visualized.    CT Chest w/ IV Cont (10.06.22 @ 21:01)   IMPRESSION: Nodular ground glass opacities in the right upper and middle lobes are likely infectious/inflammatory in etiology. Follow-up CT chest is suggested 4-6 weeks after treatment to assess for resolution.  Several new vertebral body compression fractures since CT chest December 20, 2015, including a severe compression deformity of the T7 vertebral   body, and moderate compression deformity of the T9 vertebral body.      D/D:  Sepsis of unclear etiology suspected LLL PNA  ? Flare of Polymyositis  ??Vascular dementia     Plan:   CT Neck with submandibular sialolith; CT Chest with GGO upper and middle lobe concern for Aspiration Pneumonia   Follow up Blood Cx remain negative at 24 hrs; U/A was negative  Empirically cover for any aspiration given chronic cough with Levaquin and Flagyl;   ID Consult appreciated; d/w Dr. Dillon; agree with Levaquin and Flagyl  Hold Lasix, May continue Amlodipine with parameters  TSH WNL  F/U Folate, B12, Homocysteine, MMA to rule out reversible causes of dementia as I am not sure if induced by Prednisone.   DVT ppx with Lovenox    Rest as per PGY1/MS3 above; d/w Dr. Maldonado and RN; OOB to Chair and PT michael  Discussed with Patrice Kunz at bedside; He is worried about copay during Hospital stay  Myself and ID d/w Spouse and reviewed CT findings, if remain stable, no further fever and Blood Cx remain negative at 48 hrs will consider D/C Plan possibly tomorrow;

## 2022-10-08 ENCOUNTER — TRANSCRIPTION ENCOUNTER (OUTPATIENT)
Age: 77
End: 2022-10-08

## 2022-10-08 VITALS
SYSTOLIC BLOOD PRESSURE: 108 MMHG | TEMPERATURE: 99 F | HEART RATE: 113 BPM | OXYGEN SATURATION: 94 % | DIASTOLIC BLOOD PRESSURE: 68 MMHG | RESPIRATION RATE: 18 BRPM

## 2022-10-08 LAB
ANION GAP SERPL CALC-SCNC: 12 MMOL/L — SIGNIFICANT CHANGE UP (ref 5–17)
ANION GAP SERPL CALC-SCNC: 5 MMOL/L — SIGNIFICANT CHANGE UP (ref 5–17)
BUN SERPL-MCNC: 10 MG/DL — SIGNIFICANT CHANGE UP (ref 7–18)
BUN SERPL-MCNC: 12 MG/DL — SIGNIFICANT CHANGE UP (ref 7–18)
CALCIUM SERPL-MCNC: 8.9 MG/DL — SIGNIFICANT CHANGE UP (ref 8.4–10.5)
CALCIUM SERPL-MCNC: 9 MG/DL — SIGNIFICANT CHANGE UP (ref 8.4–10.5)
CHLORIDE SERPL-SCNC: 115 MMOL/L — HIGH (ref 96–108)
CHLORIDE SERPL-SCNC: 115 MMOL/L — HIGH (ref 96–108)
CO2 SERPL-SCNC: 19 MMOL/L — LOW (ref 22–31)
CO2 SERPL-SCNC: 22 MMOL/L — SIGNIFICANT CHANGE UP (ref 22–31)
CREAT SERPL-MCNC: 0.47 MG/DL — LOW (ref 0.5–1.3)
CREAT SERPL-MCNC: 0.67 MG/DL — SIGNIFICANT CHANGE UP (ref 0.5–1.3)
CULTURE RESULTS: SIGNIFICANT CHANGE UP
EGFR: 91 ML/MIN/1.73M2 — SIGNIFICANT CHANGE UP
EGFR: 99 ML/MIN/1.73M2 — SIGNIFICANT CHANGE UP
GLUCOSE SERPL-MCNC: 129 MG/DL — HIGH (ref 70–99)
GLUCOSE SERPL-MCNC: 99 MG/DL — SIGNIFICANT CHANGE UP (ref 70–99)
HCT VFR BLD CALC: 32 % — LOW (ref 34.5–45)
HGB BLD-MCNC: 10.4 G/DL — LOW (ref 11.5–15.5)
MAGNESIUM SERPL-MCNC: 2.2 MG/DL — SIGNIFICANT CHANGE UP (ref 1.6–2.6)
MCHC RBC-ENTMCNC: 30.2 PG — SIGNIFICANT CHANGE UP (ref 27–34)
MCHC RBC-ENTMCNC: 32.5 GM/DL — SIGNIFICANT CHANGE UP (ref 32–36)
MCV RBC AUTO: 93 FL — SIGNIFICANT CHANGE UP (ref 80–100)
NRBC # BLD: 0 /100 WBCS — SIGNIFICANT CHANGE UP (ref 0–0)
PHOSPHATE SERPL-MCNC: 2.1 MG/DL — LOW (ref 2.5–4.5)
PLATELET # BLD AUTO: 312 K/UL — SIGNIFICANT CHANGE UP (ref 150–400)
POTASSIUM SERPL-MCNC: 3.1 MMOL/L — LOW (ref 3.5–5.3)
POTASSIUM SERPL-MCNC: 5.4 MMOL/L — HIGH (ref 3.5–5.3)
POTASSIUM SERPL-SCNC: 3.1 MMOL/L — LOW (ref 3.5–5.3)
POTASSIUM SERPL-SCNC: 5.4 MMOL/L — HIGH (ref 3.5–5.3)
RBC # BLD: 3.44 M/UL — LOW (ref 3.8–5.2)
RBC # FLD: 16.9 % — HIGH (ref 10.3–14.5)
SODIUM SERPL-SCNC: 142 MMOL/L — SIGNIFICANT CHANGE UP (ref 135–145)
SODIUM SERPL-SCNC: 146 MMOL/L — HIGH (ref 135–145)
SPECIMEN SOURCE: SIGNIFICANT CHANGE UP
TROPONIN I, HIGH SENSITIVITY RESULT: 87.4 NG/L — HIGH
WBC # BLD: 8.03 K/UL — SIGNIFICANT CHANGE UP (ref 3.8–10.5)
WBC # FLD AUTO: 8.03 K/UL — SIGNIFICANT CHANGE UP (ref 3.8–10.5)

## 2022-10-08 PROCEDURE — 99239 HOSP IP/OBS DSCHRG MGMT >30: CPT | Mod: GC

## 2022-10-08 PROCEDURE — 99223 1ST HOSP IP/OBS HIGH 75: CPT

## 2022-10-08 RX ORDER — SODIUM,POTASSIUM PHOSPHATES 278-250MG
1 POWDER IN PACKET (EA) ORAL ONCE
Refills: 0 | Status: COMPLETED | OUTPATIENT
Start: 2022-10-08 | End: 2022-10-08

## 2022-10-08 RX ORDER — POTASSIUM CHLORIDE 20 MEQ
40 PACKET (EA) ORAL EVERY 4 HOURS
Refills: 0 | Status: COMPLETED | OUTPATIENT
Start: 2022-10-08 | End: 2022-10-08

## 2022-10-08 RX ORDER — FAMOTIDINE 10 MG/ML
20 INJECTION INTRAVENOUS
Refills: 0 | Status: DISCONTINUED | OUTPATIENT
Start: 2022-10-08 | End: 2022-10-08

## 2022-10-08 RX ORDER — METRONIDAZOLE 500 MG
1 TABLET ORAL
Qty: 12 | Refills: 0
Start: 2022-10-08 | End: 2022-10-11

## 2022-10-08 RX ORDER — PANTOPRAZOLE SODIUM 20 MG/1
40 TABLET, DELAYED RELEASE ORAL ONCE
Refills: 0 | Status: COMPLETED | OUTPATIENT
Start: 2022-10-08 | End: 2022-10-08

## 2022-10-08 RX ORDER — ALBUTEROL 90 UG/1
3 AEROSOL, METERED ORAL
Qty: 60 | Refills: 0
Start: 2022-10-08 | End: 2022-10-12

## 2022-10-08 RX ORDER — FAMOTIDINE 10 MG/ML
1 INJECTION INTRAVENOUS
Qty: 60 | Refills: 0
Start: 2022-10-08 | End: 2022-11-06

## 2022-10-08 RX ORDER — FUROSEMIDE 40 MG
1 TABLET ORAL
Qty: 0 | Refills: 0 | DISCHARGE

## 2022-10-08 RX ORDER — LEVOFLOXACIN 5 MG/ML
1 INJECTION, SOLUTION INTRAVENOUS
Qty: 4 | Refills: 0
Start: 2022-10-08 | End: 2022-10-11

## 2022-10-08 RX ORDER — POTASSIUM CHLORIDE 20 MEQ
10 PACKET (EA) ORAL
Refills: 0 | Status: DISCONTINUED | OUTPATIENT
Start: 2022-10-08 | End: 2022-10-08

## 2022-10-08 RX ADMIN — AMLODIPINE BESYLATE 10 MILLIGRAM(S): 2.5 TABLET ORAL at 05:28

## 2022-10-08 RX ADMIN — MEMANTINE HYDROCHLORIDE 5 MILLIGRAM(S): 10 TABLET ORAL at 05:28

## 2022-10-08 RX ADMIN — Medication 40 MILLIEQUIVALENT(S): at 10:01

## 2022-10-08 RX ADMIN — FLUDROCORTISONE ACETATE 0.1 MILLIGRAM(S): 0.1 TABLET ORAL at 05:28

## 2022-10-08 RX ADMIN — Medication 100 MILLIGRAM(S): at 15:37

## 2022-10-08 RX ADMIN — Medication 1 PACKET(S): at 08:34

## 2022-10-08 RX ADMIN — Medication 100 MILLIGRAM(S): at 05:27

## 2022-10-08 RX ADMIN — PANTOPRAZOLE SODIUM 40 MILLIGRAM(S): 20 TABLET, DELAYED RELEASE ORAL at 11:15

## 2022-10-08 RX ADMIN — Medication 100 MILLIEQUIVALENT(S): at 08:34

## 2022-10-08 NOTE — DISCHARGE NOTE PROVIDER - NSDCMRMEDTOKEN_GEN_ALL_CORE_FT
albuterol 2.5 mg/3 mL (0.083%) inhalation solution: 3 milliliter(s) by nebulizer every 6 hours   amLODIPine 10 mg oral tablet: 1 tab(s) orally once a day  Florinef Acetate 0.1 mg oral tablet: 1 tab(s) orally once a day  furosemide 20 mg oral tablet: 1 tab(s) orally once a day  Lipitor 10 mg oral tablet: 1 tab(s) orally once a day  Melatonin 5 mg oral tablet: 1 tab(s) orally once a day (at bedtime)  Namenda 5 mg oral tablet: 1 tab(s) orally 2 times a day   albuterol 2.5 mg/3 mL (0.083%) inhalation solution: 3 milliliter(s) by nebulizer every 6 hours   amLODIPine 10 mg oral tablet: 1 tab(s) orally once a day  famotidine 20 mg oral tablet: 1 tab(s) orally 2 times a day  Florinef Acetate 0.1 mg oral tablet: 1 tab(s) orally once a day  furosemide 20 mg oral tablet: 1 tab(s) orally once a day  levoFLOXacin 500 mg oral tablet: 1 tab(s) orally every 24 hours  Lipitor 10 mg oral tablet: 1 tab(s) orally once a day  Melatonin 5 mg oral tablet: 1 tab(s) orally once a day (at bedtime)  metroNIDAZOLE 500 mg oral tablet: 1 tab(s) orally every 8 hours   Namenda 5 mg oral tablet: 1 tab(s) orally 2 times a day   albuterol 2.5 mg/3 mL (0.083%) inhalation solution: 3 milliliter(s) by nebulizer every 6 hours   amLODIPine 10 mg oral tablet: 1 tab(s) orally once a day  famotidine 20 mg oral tablet: 1 tab(s) orally 2 times a day  Florinef Acetate 0.1 mg oral tablet: 1 tab(s) orally once a day  levoFLOXacin 500 mg oral tablet: 1 tab(s) orally every 24 hours  Lipitor 10 mg oral tablet: 1 tab(s) orally once a day  Melatonin 5 mg oral tablet: 1 tab(s) orally once a day (at bedtime)  metroNIDAZOLE 500 mg oral tablet: 1 tab(s) orally every 8 hours   Namenda 5 mg oral tablet: 1 tab(s) orally 2 times a day

## 2022-10-08 NOTE — PROGRESS NOTE ADULT - SUBJECTIVE AND OBJECTIVE BOX
MS3 Progress Note discussed with resident and attending    TEAMS or PAGER #: [[phone]]  TILL 5:00 PM  PLEASE CONTACT ON CALL TEAM:  - On Call Team (Please refer to Ava) FROM 5:00 PM - 8:30PM  - Nightfloat Team FROM 8:30 -7:30 AM      INTERVAL HPI/OVERNIGHT EVENTS: No events overnight. Pt assessed at the bedside, in NAD, denies any chest pain, sob, fever, chills, n/v/d. She is drowsy upon assessment and states to have headache and shakiness in upper extremities that began upon awakening this morning, no fevers or chills. Pt states that weakness is worse today and that she has no appetite, was able to drink some milk but not eat breakfast.  Will continue to monitor and reassess        MEDICATIONS  (STANDING):  amLODIPine   Tablet 10 milliGRAM(s) Oral daily  atorvastatin 10 milliGRAM(s) Oral at bedtime  enoxaparin Injectable 40 milliGRAM(s) SubCutaneous every 24 hours  fludroCORTISONE 0.1 milliGRAM(s) Oral daily  levoFLOXacin IVPB 500 milliGRAM(s) IV Intermittent every 24 hours  memantine 5 milliGRAM(s) Oral two times a day  metroNIDAZOLE  IVPB 500 milliGRAM(s) IV Intermittent every 8 hours  sodium chloride 0.9%. 1000 milliLiter(s) (70 mL/Hr) IV Continuous <Continuous>    MEDICATIONS  (PRN):      REVIEW OF SYSTEMS:  CONSTITUTIONAL: No fever, weight loss, or fatigue  RESPIRATORY: No cough, wheezing, chills or hemoptysis; No shortness of breath  CARDIOVASCULAR: No chest pain, palpitations, dizziness, or leg swelling  GASTROINTESTINAL: No abdominal pain. No nausea, vomiting, or hematemesis; No diarrhea or constipation. No melena or hematochezia.  GENITOURINARY: No dysuria or hematuria, urinary frequency  NEUROLOGICAL: + headaches, loss of strength. No memory loss, numbness.  SKIN: No itching, burning, rashes, or lesions     Vital Signs Last 24 Hrs  T(C): 36.7 (07 Oct 2022 05:48), Max: 37.1 (06 Oct 2022 22:50)  T(F): 98 (07 Oct 2022 05:48), Max: 98.8 (06 Oct 2022 22:50)  HR: 94 (07 Oct 2022 05:48) (87 - 94)  BP: 114/65 (07 Oct 2022 05:48) (108/64 - 114/65)  BP(mean): --  RR: 18 (07 Oct 2022 05:48) (17 - 18)  SpO2: 96% (07 Oct 2022 05:48) (96% - 99%)    Parameters below as of 07 Oct 2022 05:48  Patient On (Oxygen Delivery Method): room air        PHYSICAL EXAMINATION:  GENERAL: NAD, tired appearing, elderly  HEAD:  Atraumatic, Normocephalic  EYES:  conjunctiva and sclera clear  NECK: Supple, No JVD, Normal thyroid  CHEST/LUNG: + b/l crackles; No rhonchi, wheezing, or rubs  HEART: Regular rate and rhythm; No murmurs, rubs, or gallops  ABDOMEN: Soft, Nontender, Nondistended; Bowel sounds present  NERVOUS SYSTEM:  Alert & Oriented X2  EXTREMITIES:  2+ Peripheral Pulses, No clubbing, cyanosis, or edema  SKIN: warm, dry                          10.0   13.68 )-----------( 308      ( 07 Oct 2022 06:33 )             31.0     10-07    146<H>  |  115<H>  |  18  ----------------------------<  71  3.1<L>   |  20<L>  |  0.50    Ca    8.8      07 Oct 2022 06:33  Phos  2.4     10-07  Mg     2.1     10-07    TPro  7.0  /  Alb  2.9<L>  /  TBili  0.4  /  DBili  x   /  AST  41<H>  /  ALT  25  /  AlkPhos  86  10-06    LIVER FUNCTIONS - ( 06 Oct 2022 11:00 )  Alb: 2.9 g/dL / Pro: 7.0 g/dL / ALK PHOS: 86 U/L / ALT: 25 U/L DA / AST: 41 U/L / GGT: x           CARDIAC MARKERS ( 06 Oct 2022 18:21 )  x     / x     / 220 U/L / x     / x          PT/INR - ( 06 Oct 2022 11:00 )   PT: 13.9 sec;   INR: 1.17 ratio         PTT - ( 06 Oct 2022 11:00 )  PTT:32.5 sec    CAPILLARY BLOOD GLUCOSE      RADIOLOGY & ADDITIONAL TESTS:                  
PGY-1 Progress Note discussed with attending    TEAMS or PAGER #: [[phone]]  TILL 5:00 PM  PLEASE CONTACT ON CALL TEAM:  - On Call Team (Please refer to Ava) FROM 5:00 PM - 8:30PM  - Nightfloat Team FROM 8:30 -7:30 AM      INTERVAL HPI/OVERNIGHT EVENTS: No events overnight. Pt assessed at the bedside, in NAD, reports substernal 7/10 chest pain, EKG was done and has not changed since admission, no acute findings discussed with attending Dr. Tomas. Ordered PPI IV, Otherwise pt has no, dyspnea, fever, chills, n/v/d. Pt was seen by ID yesterday, spoke to  at the bedside regarding pts status. Will continue to monitor and reassess.         MEDICATIONS  (STANDING):  amLODIPine   Tablet 10 milliGRAM(s) Oral daily  atorvastatin 10 milliGRAM(s) Oral at bedtime  enoxaparin Injectable 40 milliGRAM(s) SubCutaneous every 24 hours  famotidine    Tablet 20 milliGRAM(s) Oral two times a day  fludroCORTISONE 0.1 milliGRAM(s) Oral daily  levoFLOXacin IVPB 750 milliGRAM(s) IV Intermittent every 24 hours  memantine 5 milliGRAM(s) Oral two times a day  metroNIDAZOLE  IVPB 500 milliGRAM(s) IV Intermittent every 8 hours  pantoprazole  Injectable 40 milliGRAM(s) IV Push once  potassium chloride   Powder 40 milliEquivalent(s) Oral every 4 hours  sodium chloride 0.9%. 1000 milliLiter(s) (70 mL/Hr) IV Continuous <Continuous>    MEDICATIONS  (PRN):      REVIEW OF SYSTEMS:  CONSTITUTIONAL: No fever, weight loss, or fatigue  RESPIRATORY: No cough, wheezing, chills or hemoptysis; No shortness of breath  CARDIOVASCULAR: No chest pain, palpitations, dizziness, or leg swelling  GASTROINTESTINAL: No abdominal pain. No nausea, vomiting, or hematemesis; No diarrhea or constipation. No melena or hematochezia.  GENITOURINARY: No dysuria or hematuria, urinary frequency  NEUROLOGICAL: No headaches, memory loss, loss of strength, numbness, or tremors  SKIN: No itching, burning, rashes, or lesions     Vital Signs Last 24 Hrs  T(C): 36.7 (08 Oct 2022 05:21), Max: 37.5 (07 Oct 2022 18:29)  T(F): 98 (08 Oct 2022 05:21), Max: 99.5 (07 Oct 2022 18:29)  HR: 93 (08 Oct 2022 05:21) (93 - 102)  BP: 118/72 (08 Oct 2022 05:21) (111/56 - 118/72)  BP(mean): --  RR: 18 (08 Oct 2022 05:21) (18 - 18)  SpO2: 99% (08 Oct 2022 05:21) (98% - 100%)    Parameters below as of 08 Oct 2022 05:21  Patient On (Oxygen Delivery Method): room air        PHYSICAL EXAMINATION:  GENERAL: NAD, elderly, cachectic  HEAD:  Atraumatic, Normocephalic  EYES:  conjunctiva and sclera clear  NECK: Supple, No JVD, Normal thyroid  CHEST/LUNG: Clear to auscultation. Clear to percussion bilaterally; No rales, rhonchi, wheezing, or rubs  HEART: Regular rate and rhythm; No murmurs, rubs, or gallops  ABDOMEN: Soft, Nontender, Nondistended; Bowel sounds present  NERVOUS SYSTEM:  Alert & Oriented X2,    EXTREMITIES:  2+ Peripheral Pulses, No clubbing, cyanosis, or edema  SKIN: warm dry                          10.4   8.03  )-----------( 312      ( 08 Oct 2022 06:33 )             32.0     10-08    146<H>  |  115<H>  |  10  ----------------------------<  99  3.1<L>   |  19<L>  |  0.47<L>    Ca    9.0      08 Oct 2022 06:33  Phos  2.1     10-08  Mg     2.2     10-08    TPro  7.0  /  Alb  2.9<L>  /  TBili  0.4  /  DBili  x   /  AST  41<H>  /  ALT  25  /  AlkPhos  86  10-06    LIVER FUNCTIONS - ( 06 Oct 2022 11:00 )  Alb: 2.9 g/dL / Pro: 7.0 g/dL / ALK PHOS: 86 U/L / ALT: 25 U/L DA / AST: 41 U/L / GGT: x           CARDIAC MARKERS ( 06 Oct 2022 18:21 )  x     / x     / 220 U/L / x     / x          PT/INR - ( 06 Oct 2022 11:00 )   PT: 13.9 sec;   INR: 1.17 ratio         PTT - ( 06 Oct 2022 11:00 )  PTT:32.5 sec    CAPILLARY BLOOD GLUCOSE      RADIOLOGY & ADDITIONAL TESTS:

## 2022-10-08 NOTE — PROGRESS NOTE ADULT - PROBLEM SELECTOR PLAN 2
on chronic prednisone at home, recently was stopped and placed on Florinet  c/o right neck pain, very tensed on physical exam  ESR- 36 (10/5)  f/u CPK, aldolase, CRP  CT neck- No mass or abnormal enhancement is identified.
on chronic prednisone at home, recently was stopped and placed on Florinet  c/o right neck pain, very tensed on physical exam  Will get CT neck to evaluate  ESR- 36 (10/5)  f/u CPK, aldolase, CRP

## 2022-10-08 NOTE — DISCHARGE NOTE PROVIDER - NSDCFUSCHEDAPPT_GEN_ALL_CORE_FT
Evelyne Jesus  [facility] Physician [facility]  NEUROLOGY [address] [facility]  Scheduled Appointment: 12/08/2022

## 2022-10-08 NOTE — DISCHARGE NOTE NURSING/CASE MANAGEMENT/SOCIAL WORK - NSDCPEFALRISK_GEN_ALL_CORE
For information on Fall & Injury Prevention, visit: [email or web address] OR  [email or web address] OR  https://www.cdc.gov/steadi/patient.html

## 2022-10-08 NOTE — DISCHARGE NOTE PROVIDER - NSDCHHASSISTILLNESS_GEN_ALL_CORE
Advance Care Planning     PCP:  NIYAH Ayoub  Discussion Leader:  Dr. Nevarez  Participants: myself, Coral [place] (MS2), Dr. Ca, patient  Location:  bedside    Goals of Care Discussion    Patient has one or more life-limiting conditions: no    A discussion of the patient’s Goals of Care has been completed with the patient regarding the following:   · Reason for Discussion: Goals of care discussion needed  · Treatment Considerations: n/a  · Prognostication:   · Is patient likely to return to baseline function?: Yes  · Surprise Question: No  · Patient-Centered Goals: Aggressive, usual medical care  · Plan for Future Deterioration: Would be ok with returning to hospital for care.    Discussed Goals of Care and those present have a good understanding of everything discussed above.    The following additional treatment/referrals is recommended: n/a  Recommend follow-up Goals of Care discussion within the following timeframe: n/a    Summary of Discussion: discussed code status. Patient wishes to be full code.    Does the patient have a state-issued Ambulatory Code Status ([place]-POLST / [place]-DNR) order: No  After discussion, the patient has decided on Full Resuscitation     Discussion Start Time: 915  Discussion End Time: 920    Signed  Ramirez Nevarez MD  PGY-1, Internal Medicine  3/20/2022 5:46 PM  P     Dementia and memory impairment

## 2022-10-08 NOTE — PROGRESS NOTE ADULT - PROBLEM SELECTOR PROBLEM 1
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  Denies any chest pain no shortness of breath is eating and drinking okay      Cough   Cough characteristics:  Non-productive  Severity:  Mild  Onset quality:  Gradual  Duration:  1 week  Progression:  Unable to specify  Chronicity:  Recurrent  Context: sick contacts    Relieved by:  None tried  Worsened by:  Nothing  Ineffective treatments:  None tried  Associated symptoms: headaches, myalgias and sinus congestion    Associated symptoms: no chest pain, no chills, no diaphoresis, no ear fullness, no ear pain, no eye discharge, no fever, no rash, no shortness of breath, no sore throat, no weight loss and no wheezing        Review of Systems   Constitutional: Negative for activity change, appetite change, chills, diaphoresis, fatigue, fever and weight loss.   HENT: Negative for congestion, ear pain, sore throat and trouble swallowing.    Eyes: Negative for discharge and redness.   Respiratory: Positive for cough. Negative for apnea, chest tightness, shortness of breath, wheezing and stridor.    Cardiovascular: Negative for chest pain, palpitations and leg swelling.   Gastrointestinal: Negative for abdominal distention, abdominal pain and nausea.   Musculoskeletal: Positive for myalgias. Negative for back pain, joint swelling and neck pain.   Skin: Negative for color change, pallor and rash.   Neurological: Positive for headaches. Negative for dizziness and numbness.       No past medical history on file.    Allergies   Allergen Reactions   • Penicillins Swelling       No past surgical history on file.    No family history on file.    Social History     Socioeconomic History   • Marital status: Single     Spouse name: Not on 
file   • Number of children: Not on file   • Years of education: Not on file   • Highest education level: Not on file           Objective   Physical Exam   Constitutional: He is oriented to person, place, and time. He appears well-developed and well-nourished. No distress.   HENT:   Head: Normocephalic and atraumatic.   Eyes: Pupils are equal, round, and reactive to light. Conjunctivae and EOM are normal.   Neck: Normal range of motion. Neck supple.   Cardiovascular: Normal rate, regular rhythm, normal heart sounds and intact distal pulses. Exam reveals no gallop and no friction rub.   No murmur heard.  Pulmonary/Chest: Effort normal and breath sounds normal. No respiratory distress. He has no wheezes. He has no rales. He exhibits no tenderness.   Abdominal: Soft. Bowel sounds are normal. He exhibits no distension. There is no tenderness.   Musculoskeletal: Normal range of motion.   Neurological: He is alert and oriented to person, place, and time.   Skin: Skin is warm and dry. No rash noted. He is not diaphoretic.   Psychiatric: He has a normal mood and affect. His behavior is normal. Judgment and thought content normal.   Nursing note and vitals reviewed.      Procedures           ED Course  ED Course as of Jul 18 0830   Sat Jul 18, 2020   0746 Proper PPE was worn whole entire exam    []   0828 Chest x-ray normal    []      ED Course User Index  [] Марина Villanueva APRN          Xr Chest 1 View    Result Date: 7/18/2020  No acute cardiopulmonary abnormality.  Electronically Signed By-Chris Clark On:7/18/2020 8:23 AM This report was finalized on [id number] by  Chris Clark, .    Medications - No data to display  Labs Reviewed   RESPIRATORY PANEL PCR W/ COVID-19 (SARS-COV-2) [facility] IN-HOUSE, NP SWAB IN UTM/VTP, 8-12 HR TAT                                       MDM  Number of Diagnoses or Management Options  Cough:   Exposure to COVID-19 virus:   Viral syndrome:   Diagnosis management comments: 
Disposition: Discharged.    Patient discharged in stable condition.    Reviewed implications of results, diagnosis, meds, responsibility to follow up, warning signs and symptoms of possible worsening, potential complications and reasons to return to ER increase symptoms chest pain shortness of breath fever chills    Patient/Family voiced understanding of above instructions.    Discussed plan for discharge, as there is no emergent indication for admission.  Pt/family is agreeable and understands need for follow up and repeat testing.  Pt is aware that discharge does not mean that nothing is wrong but it indicates no emergency is present and they must continue care with follow-up as given below or physician of their choice.     FOLLOW-UP  Ward Chavez  [facility] PT [address] [phone] an appointment as soon as possible for a visit in 2 daysIf symptoms worsen  [facility] EMERGENCY [id number] [facility] [phone] symptoms worsen       Medication List      No changes were made to your prescriptions during this visit.            Amount and/or Complexity of Data Reviewed  Tests in the radiology section of CPT®: reviewed        Final diagnoses:   Viral syndrome   Cough   Exposure to COVID-19 virus            Марина Villanueva, APRN  07/18/20 [id number]    
Sepsis
Sepsis

## 2022-10-08 NOTE — DISCHARGE NOTE NURSING/CASE MANAGEMENT/SOCIAL WORK - PATIENT PORTAL LINK FT
You can access the FollowMyHealth Patient Portal offered by [facility] by registering at the following website: [email or web address]. By joining [facility]’s FollowMyHealth portal, you will also be able to view your health information using other applications (apps) compatible with our system.

## 2022-10-08 NOTE — DISCHARGE NOTE PROVIDER - PROVIDER TOKENS
PROVIDER:[TOKEN:[[id number]],FOLLOWUP:[1 week]],PROVIDER:[TOKEN:[[id number]],FOLLOWUP:[1 week]]

## 2022-10-08 NOTE — DISCHARGE NOTE PROVIDER - CARE PROVIDERS DIRECT ADDRESSES
[email or web address]

## 2022-10-08 NOTE — DISCHARGE NOTE PROVIDER - HOSPITAL COURSE
76 year old  w/ PMHx anemia, asthma, HTN, ITP, muscular dystrophy, polymyositis (prev on prednisone) PE (prev on Eliquis) , presents with increase in weakness. Patient states she returned from vacation from [place] two days ago and then this morning started to feel more tired and was having right neck pain. Patient also endorses having a hx of chronic cough, but now her cough is productive with pink/cream phlegm. Denies any fever, chills, nausea, vomiting headache, visual changes, chest pain, SOB, abdominal pain, diarrhea or constipation.  According to  at bedside, patient was recently taken off prednisone due to worsening dementia. For the past year, her dementia has been getting progressively worse, now A/O x1, but still able to ambulate with walker and cook and clean for herself.   In the ED:Temp 100.5, , /72, 97% on RA, WBC 17, CXR Slight increase left base interstitial markings, s/p Levaquin + 1.2L bolus. Found to be septic, unknown source at this time. Was admitted to [address] [place] [address]. CT chest showed ground glass opacities. CT neck was negative. ID cosulted, pt continued on Flagyl and Levaquin, pt WBC came down to 8k on 10/8.   Patient is able to tolerate diet prior to discharge. Patient is stable for discharge per attending and is advised to follow up with PCP as outpatient. Please refer to patient's complete medical chart with documents for a full hospital course, for this is only a brief summary.

## 2022-10-08 NOTE — PHYSICAL THERAPY INITIAL EVALUATION ADULT - RANGE OF MOTION EXAMINATION, REHAB EVAL
except for left sh flex to ~ 90 deg/bilateral upper extremity ROM was WFL (within functional limits)/bilateral lower extremity ROM was WFL (within functional limits)

## 2022-10-08 NOTE — CONSULT NOTE ADULT - TIME BILLING
I counseled the patient about her likely diagnoses, and the appropriate next steps for management of her symptoms.

## 2022-10-08 NOTE — PROGRESS NOTE ADULT - PROBLEM SELECTOR PLAN 3
pt takes amlodipine 10mg and lasix at home  Will hold lasix as pt looks dry  c/w amlodipine   DASH diet
pt takes amlodipine 10mg and lasix at home  Will hold lasix as pt looks dry  c/w amlodipine   DASH diet

## 2022-10-08 NOTE — PROGRESS NOTE ADULT - PROBLEM SELECTOR PLAN 5
A/O x1-2 at baseline, able to cook and dress herself  Currently At baseline   On Namenda at home  c/w home meds
A/O x1-2 at baseline, able to cook and dress herself  Currently At baseline   On Namenda at home  c/w home meds

## 2022-10-08 NOTE — PHYSICAL THERAPY INITIAL EVALUATION ADULT - CRITERIA FOR SKILLED THERAPEUTIC INTERVENTIONS
pt will benefit from D/C to [facility] however prefers D/C home with home PT/impairments found/functional limitations in following categories/anticipated discharge recommendation

## 2022-10-08 NOTE — PROGRESS NOTE ADULT - PROBLEM SELECTOR PLAN 1
p/w right neck pain, and cough w/ pink/cream phlegm  100.5,   WBC 8k (10/8), decreased from 17 (10/6)  UA small LE and trace blood, COVID negative, RVP negative  CXR shows small opacities LLL  s/p Levaquin, s/p 1.2 L  CT chest- Nodular groundglass opacities in the right upper and middle lobes are   likely infectious/inflammatory in etiology  Will continue Flagyl and Levaquin as per ID
p/w right neck pain, and cough w/ pink/cream phlegm  100.5,   WBC 10 (10/7), decreased from 17 (10/6)  UA small LE and trace blood, COVID negative, RVP negative  CXR shows small opacities LLL  unknown cause of sepsis, will get CTA chest to evaluate for LLL opacities  s/p Levaquin, s/p 1.2 L  Will continue Flagyl and Levaquin

## 2022-10-08 NOTE — PROGRESS NOTE ADULT - PROBLEM SELECTOR PLAN 6
IMPROVE VTE Individual Risk Assessment          RISK                                                          Points  [  ] Previous VTE                                                3  [  ] Thrombophilia                                             2  [  ] Lower limb paralysis                                   2        (unable to hold up >15 seconds)    [  ] Current Cancer                                             2         (within 6 months)  [x  ] Immobilization > 24 hrs                              1  [  ] ICU/CCU stay > 24 hours                             1  [x  ] Age > 60                                                         1    IMPROVE VTE Score:         [    2     ]      B/l LE US duplex- no evidence of DVT (10/6), will continue lovenox for DVT prophylaxis
IMPROVE VTE Individual Risk Assessment          RISK                                                          Points  [  ] Previous VTE                                                3  [  ] Thrombophilia                                             2  [  ] Lower limb paralysis                                   2        (unable to hold up >15 seconds)    [  ] Current Cancer                                             2         (within 6 months)  [x  ] Immobilization > 24 hrs                              1  [  ] ICU/CCU stay > 24 hours                             1  [x  ] Age > 60                                                         1    IMPROVE VTE Score:         [    2     ]      B/l LE US duplex- no evidence of DVT (10/6), will continue lovenox for DVT prophylaxis

## 2022-10-08 NOTE — PROGRESS NOTE ADULT - ASSESSMENT
76 year old  w/ PMHx anemia, asthma, HTN, ITP, muscular dystrophy, polymyositis (prev on prednisone) PE (prev on Eliquis) , presents with increase in weakness. Found to be septic, unknown source at this time. 
76 year old  w/ PMHx anemia, asthma, HTN, ITP, muscular dystrophy, polymyositis (prev on prednisone) PE (prev on Eliquis) , presents with increase in weakness. Found to be septic, unknown source at this time.

## 2022-10-08 NOTE — DISCHARGE NOTE PROVIDER - CARE PROVIDER_API CALL
Michelle Wilkinson)  Critical Care Medicine; Pulmonary Disease  Medicine at [facility] [address] [place] [address]  Phone: [phone]  Fax: [phone]  Follow Up Time: 1 week    SAKSHI Spears  INTERNAL MEDICINE  [address] [place] [address]  Phone: [phone]  Fax: [phone]  Follow Up Time: 1 week

## 2022-10-08 NOTE — PHYSICAL THERAPY INITIAL EVALUATION ADULT - GENERAL OBSERVATIONS, REHAB EVAL
Pt seen supine in bed with IV, denied any c/o pain/discomfort, was cooperative during assessment,  at bedside

## 2022-10-08 NOTE — DISCHARGE NOTE PROVIDER - NSDCCPCAREPLAN_GEN_ALL_CORE_FT
PRINCIPAL DISCHARGE DIAGNOSIS  Diagnosis: Sepsis  Assessment and Plan of Treatment: Complete the antibiotic course. You presented with fever, elevated heart rate, elevated white blood cell count indicating a possible infection. As well as weakness and altered mental status. You were treated with Intravenous antibiotics and fluids. Keep taking the prescribed medication and Follow up with your Primary Care Doctor in 1 week  Your chest xray showed some abnormality. You may be benefitted from bronchoscopy. Follow up with the Lung doctor for further workup.  - You presented with leukocytosis likely secondary to pneumonia  - You were treated with Intravenous antibiotics and fluids.  - empiric abx therapy for unknown source - Flagyl+ Levaquin  - Antibiotics stopped upon discharge  - ID - consulted Dr Dillon  - Follow up with your Primary Care Doctor in 1 week         PRINCIPAL DISCHARGE DIAGNOSIS  Diagnosis: Sepsis  Assessment and Plan of Treatment: Complete the antibiotic course. You presented with fever, elevated heart rate, elevated white blood cell count indicating a possible infection. As well as weakness and altered mental status. You were treated with Intravenous antibiotics and fluids. Keep taking the prescribed medication and Follow up with your Primary Care Doctor in 1 week  Your chest xray showed some abnormality. You may be benefitted from bronchoscopy. Follow up with the Lung doctor for further workup.  - You presented with leukocytosis likely secondary to pneumonia  - You were treated with Intravenous antibiotics and fluids.  - empiric abx therapy for unknown source - Flagyl+ Levaquin  - Please take the antibiotics Flagyl 500mg oral every 8 hours   and Levaquin 500mg oral every day, both for 4 more days after discharge.  - ID - consulted Dr Dillon  - Follow up with your Primary Care Doctor in 1 week         PRINCIPAL DISCHARGE DIAGNOSIS  Diagnosis: Sepsis  Assessment and Plan of Treatment: You presented with fever, elevated heart rate, elevated white blood cell count indicating a possible infection. As well as weakness and altered mental status and reported chills and rigors. There was no evidence of a Urinary tract infection. Chest X-Ray showed possible Pneumonia.  You were treated with Intravenous antibiotics Levaquin and Metronidazole for possible Aspiiration Pneumonia. You was also resuscitated in ED wiht IV Normal Saline Bolus and mainteneance IV fluids.   Complete the course of antibiotics for 4 more days to complete the treatment.  You was treated for Sepsis with suspected Aspiration Pneumonia.    Follow up with your Primary Care Doctor in 1 week  You have been provided with report of your CT Scan. You may benefit form a non emergent visit with a Lung Specialist (Pulmonary) as outpatient. You may follow up with our Lung Specialist Dr. Michelle Herrera in the [place] office or any Lung specialist of your or PCP choice.   - Please take the antibiotics Flagyl 500mg oral every 8 hours   and Levaquin 500mg oral every day, both for 4 more days after discharge.  - ID - consulted Dr Dillon  - Follow up with your Primary Care Doctor in 1 week         PRINCIPAL DISCHARGE DIAGNOSIS  Diagnosis: Sepsis  Assessment and Plan of Treatment: You presented with fever, elevated heart rate, elevated white blood cell count indicating a possible infection. As well as weakness and altered mental status and reported chills and rigors. There was no evidence of a Urinary tract infection. Chest X-Ray showed possible Pneumonia.  You were treated with Intravenous antibiotics Levaquin and Metronidazole for possible Aspiiration Pneumonia. You was also resuscitated in ED wiht IV Normal Saline Bolus and mainteneance IV fluids.   Complete the course of antibiotics for 4 more days to complete the treatment.  You was treated for Sepsis with suspected Aspiration Pneumonia.    Follow up with your Primary Care Doctor in 1 week  You have been provided with report of your CT Scan. You may benefit form a non emergent visit with a Lung Specialist (Pulmonary) as outpatient. You may follow up with our Lung Specialist Dr. Michelle Herrera in the [place] office or any Lung specialist of your or PCP choice.   - Please take the antibiotics Flagyl 500mg oral every 8 hours   and Levaquin 500mg oral every day, both for 4 more days after discharge.  - ID - consulted Dr Dillon  - Follow up with your Primary Care Doctor in 1 week        SECONDARY DISCHARGE DIAGNOSES  Diagnosis: Leucocytosis  Assessment and Plan of Treatment: You had elevated WBC to 17,000 on admission resolved with antibiotic treatment and fluids.    Diagnosis: Polymyositis  Assessment and Plan of Treatment: Please continue with home medication Florinef and see your primary care physician and Rheumatologist for a follow up.    Diagnosis: Dementia  Assessment and Plan of Treatment: Continue with your memantine, follow up with your primary care physician. We did some routine blood work to rule out reversible causes of Demrentia. Levels of TSH was normal suggesting no thyroid issues. Also B12, Folate and Homocysteine levels were normal. 25 Hydroxy Vitamin D level was also normal.    Diagnosis: HTN (hypertension)  Assessment and Plan of Treatment: continue with your home medication Amlodipine, follow up with your primary care physician.  Please note that you may have leg swelling possibly from Amlodipine and instead of water pill may benfit from reduction in dose of Amlodipine to 5mg and use another agent for Blood pressure control if BP more than 130/80 mm Hg.    Diagnosis: HLD (hyperlipidemia)  Assessment and Plan of Treatment: Continue with your home medication Atoravastatin, follow up with your primary care physician.     PRINCIPAL DISCHARGE DIAGNOSIS  Diagnosis: Sepsis  Assessment and Plan of Treatment: You presented with fever, elevated heart rate, elevated white blood cell count indicating a possible infection. As well as weakness and altered mental status and reported chills and rigors. There was no evidence of a Urinary tract infection. Chest X-Ray showed possible Pneumonia.  You were treated with Intravenous antibiotics Levaquin and Metronidazole for possible Aspiiration Pneumonia. You was also resuscitated in ED wiht IV Normal Saline Bolus and mainteneance IV fluids.   Complete the course of antibiotics for 4 more days to complete the treatment.  You was treated for Sepsis with suspected Aspiration Pneumonia.    Follow up with your Primary Care Doctor in 1 week  You have been provided with report of your CT Scan. You may benefit form a non emergent visit with a Lung Specialist (Pulmonary) as outpatient. You may follow up with our Lung Specialist Dr. Michelle Herrera in the [place] office or any Lung specialist of your or PCP choice.   - Please take the antibiotics Flagyl 500mg oral every 8 hours   and Levaquin 500mg oral every day, both for 4 more days after discharge.  - ID - consulted Dr Dillon  - Follow up with your Primary Care Doctor in 1 week for follow up after hospitalization        SECONDARY DISCHARGE DIAGNOSES  Diagnosis: Leucocytosis  Assessment and Plan of Treatment: You had elevated WBC to 17,000 on admission resolved with antibiotic treatment and fluids.    Diagnosis: Polymyositis  Assessment and Plan of Treatment: Please continue with home medication Florinef and see your primary care physician and Rheumatologist for a follow up.    Diagnosis: Dementia  Assessment and Plan of Treatment: Continue with your memantine, follow up with your primary care physician. We did some routine blood work to rule out reversible causes of Demrentia. Levels of TSH was normal suggesting no thyroid issues. Also B12, Folate and Homocysteine levels were normal. 25 Hydroxy Vitamin D level was also normal.  Follow up with your Neurologist as outpatient.    Diagnosis: HTN (hypertension)  Assessment and Plan of Treatment: continue with your home medication Amlodipine, follow up with your primary care physician.  Please note that you may have leg swelling possibly from Amlodipine and instead of water pill may benfit from reduction in dose of Amlodipine to 5mg and use another agent for Blood pressure control if BP more than 130/80 mm Hg.    Diagnosis: HLD (hyperlipidemia)  Assessment and Plan of Treatment: Continue with your home medication Atoravastatin, follow up with your primary care physician.    Diagnosis: Electrolyte depletion  Assessment and Plan of Treatment: You was noted to have low potassium likely form poor oral intake as well as from Diuretics (Furosemide). We replaced potassium during your stay. Repeat potassium was slightly elevated likely falsely elevated due to reepeating levels too soon after replacement.  Repeat Blood work with PCP in 7 to 10 days.

## 2022-10-08 NOTE — PHYSICAL THERAPY INITIAL EVALUATION ADULT - GAIT DEVIATIONS NOTED, PT EVAL
decreased ramandeep/increased time in double stance/decreased velocity of limb motion/decreased step length/decreased stride length/decreased weight-shifting ability

## 2022-10-09 LAB — GLUCOSE BLDC GLUCOMTR-MCNC: 87 MG/DL — SIGNIFICANT CHANGE UP (ref 70–99)

## 2022-10-10 LAB — ALDOLASE SERPL-CCNC: 10.6 U/L — HIGH (ref 3.3–10.3)

## 2022-10-11 LAB
CULTURE RESULTS: SIGNIFICANT CHANGE UP
CULTURE RESULTS: SIGNIFICANT CHANGE UP
METHYLMALONATE SERPL-SCNC: 81 NMOL/L — SIGNIFICANT CHANGE UP (ref 0–378)
SPECIMEN SOURCE: SIGNIFICANT CHANGE UP
SPECIMEN SOURCE: SIGNIFICANT CHANGE UP

## 2022-10-20 PROCEDURE — 93005 ELECTROCARDIOGRAM TRACING: CPT

## 2022-10-20 PROCEDURE — 87086 URINE CULTURE/COLONY COUNT: CPT

## 2022-10-20 PROCEDURE — 83615 LACTATE (LD) (LDH) ENZYME: CPT

## 2022-10-20 PROCEDURE — 83090 ASSAY OF HOMOCYSTEINE: CPT

## 2022-10-20 PROCEDURE — 80053 COMPREHEN METABOLIC PANEL: CPT

## 2022-10-20 PROCEDURE — 70491 CT SOFT TISSUE NECK W/DYE: CPT

## 2022-10-20 PROCEDURE — 85730 THROMBOPLASTIN TIME PARTIAL: CPT

## 2022-10-20 PROCEDURE — 71045 X-RAY EXAM CHEST 1 VIEW: CPT

## 2022-10-20 PROCEDURE — 85610 PROTHROMBIN TIME: CPT

## 2022-10-20 PROCEDURE — 85027 COMPLETE CBC AUTOMATED: CPT

## 2022-10-20 PROCEDURE — 84484 ASSAY OF TROPONIN QUANT: CPT

## 2022-10-20 PROCEDURE — 85652 RBC SED RATE AUTOMATED: CPT

## 2022-10-20 PROCEDURE — 81001 URINALYSIS AUTO W/SCOPE: CPT

## 2022-10-20 PROCEDURE — 83735 ASSAY OF MAGNESIUM: CPT

## 2022-10-20 PROCEDURE — 82607 VITAMIN B-12: CPT

## 2022-10-20 PROCEDURE — 36415 COLL VENOUS BLD VENIPUNCTURE: CPT

## 2022-10-20 PROCEDURE — 84443 ASSAY THYROID STIM HORMONE: CPT

## 2022-10-20 PROCEDURE — 80048 BASIC METABOLIC PNL TOTAL CA: CPT

## 2022-10-20 PROCEDURE — 0225U NFCT DS DNA&RNA 21 SARSCOV2: CPT

## 2022-10-20 PROCEDURE — 82085 ASSAY OF ALDOLASE: CPT

## 2022-10-20 PROCEDURE — 87040 BLOOD CULTURE FOR BACTERIA: CPT

## 2022-10-20 PROCEDURE — 83921 ORGANIC ACID SINGLE QUANT: CPT

## 2022-10-20 PROCEDURE — 82306 VITAMIN D 25 HYDROXY: CPT

## 2022-10-20 PROCEDURE — 97162 PT EVAL MOD COMPLEX 30 MIN: CPT

## 2022-10-20 PROCEDURE — 84100 ASSAY OF PHOSPHORUS: CPT

## 2022-10-20 PROCEDURE — 82962 GLUCOSE BLOOD TEST: CPT

## 2022-10-20 PROCEDURE — 99285 EMERGENCY DEPT VISIT HI MDM: CPT | Mod: 25

## 2022-10-20 PROCEDURE — 93970 EXTREMITY STUDY: CPT

## 2022-10-20 PROCEDURE — 86140 C-REACTIVE PROTEIN: CPT

## 2022-10-20 PROCEDURE — 71260 CT THORAX DX C+: CPT

## 2022-10-20 PROCEDURE — 82746 ASSAY OF FOLIC ACID SERUM: CPT

## 2022-10-20 PROCEDURE — 83605 ASSAY OF LACTIC ACID: CPT

## 2022-10-20 PROCEDURE — 82550 ASSAY OF CK (CPK): CPT

## 2022-10-20 PROCEDURE — 85025 COMPLETE CBC W/AUTO DIFF WBC: CPT

## 2022-10-24 ENCOUNTER — APPOINTMENT (OUTPATIENT)
Dept: PULMONOLOGY | Facility: CLINIC | Age: 77
End: 2022-10-24

## 2022-10-24 VITALS
HEART RATE: 50 BPM | OXYGEN SATURATION: 98 % | DIASTOLIC BLOOD PRESSURE: 82 MMHG | WEIGHT: 86 LBS | TEMPERATURE: 97 F | HEIGHT: 61 IN | SYSTOLIC BLOOD PRESSURE: 122 MMHG | BODY MASS INDEX: 16.24 KG/M2

## 2022-10-24 DIAGNOSIS — J18.9 PNEUMONIA, UNSPECIFIED ORGANISM: ICD-10-CM

## 2022-10-24 PROCEDURE — 99204 OFFICE O/P NEW MOD 45 MIN: CPT

## 2022-10-24 NOTE — ASSESSMENT
[FreeTextEntry1] : 77F with multifocal PNA - CAP vs aspiration\par Clinically improved\par Imaging personally reviewed by me\par Cough and fever have resolved\par completed Levaquin and Flagyl\par RTC prn\par \par

## 2022-10-24 NOTE — HISTORY OF PRESENT ILLNESS
[TextBox_4] : 77F nonsmoker with complicated PMH, was hospitalized at [facility] few weeks ago with pneumonia\par \par Pt presented with fever, leukocytosis and multifocal opacities on imaging, with pink white thick phlegm.\par She was treated with Levaquin and Flagyl for suspected aspiration and improved.\par She denies any dysphagia.\par \par Today she reports feeling back to her baseline, no sob or crane, no cough, no phlegm.\par She is a lifelong nonsmoker, no other pulmonary risk factors, no h/o asthma.\par \par Pt uses w/c and has been losing weight. She was dx with dementia.\par

## 2022-10-26 NOTE — CONSULT NOTE ADULT - SKIN
Medication(s) Requested: hydrocodone  Last office visit: 10/18/22  Last refill: 9/30/22 #15 Day supply  Is the patient due for refill of this medication(s): Yes  PDMP review: Criteria met. Forwarded to Physician/ROSY for signature.     
PDMP reviewed. Refill sent in.    
no rashes

## 2022-11-11 PROBLEM — I34.0 SEVERE MITRAL REGURGITATION: Status: ACTIVE | Noted: 2022-11-11

## 2022-11-14 ENCOUNTER — APPOINTMENT (OUTPATIENT)
Dept: CARDIOTHORACIC SURGERY | Facility: CLINIC | Age: 77
End: 2022-11-14
Payer: MEDICARE

## 2022-11-14 VITALS
TEMPERATURE: 98.2 F | SYSTOLIC BLOOD PRESSURE: 138 MMHG | BODY MASS INDEX: 17.56 KG/M2 | DIASTOLIC BLOOD PRESSURE: 86 MMHG | HEIGHT: 61 IN | OXYGEN SATURATION: 97 % | WEIGHT: 93 LBS | HEART RATE: 98 BPM | RESPIRATION RATE: 16 BRPM

## 2022-11-14 DIAGNOSIS — I34.0 NONRHEUMATIC MITRAL (VALVE) INSUFFICIENCY: ICD-10-CM

## 2022-11-14 DIAGNOSIS — K92.2 GASTROINTESTINAL HEMORRHAGE, UNSPECIFIED: ICD-10-CM

## 2022-11-14 PROCEDURE — 99204 OFFICE O/P NEW MOD 45 MIN: CPT

## 2022-11-14 PROCEDURE — 99214 OFFICE O/P EST MOD 30 MIN: CPT

## 2022-11-14 RX ORDER — METOPROLOL TARTRATE 25 MG/1
25 TABLET, FILM COATED ORAL
Refills: 0 | Status: COMPLETED | COMMUNITY
Start: 2020-09-02 | End: 2022-11-14

## 2022-11-14 RX ORDER — FLUDROCORTISONE ACETATE 0.1 MG/1
0.1 TABLET ORAL
Qty: 14 | Refills: 0 | Status: ACTIVE | COMMUNITY
Start: 2022-11-14

## 2022-11-14 RX ORDER — FLUDROCORTISONE ACETATE 0.1 MG/1
0.1 TABLET ORAL
Refills: 0 | Status: COMPLETED | COMMUNITY
Start: 2020-09-02 | End: 2022-11-14

## 2022-11-14 RX ORDER — APIXABAN 5 MG/1
5 TABLET, FILM COATED ORAL
Refills: 0 | Status: COMPLETED | COMMUNITY
Start: 2020-09-02 | End: 2022-11-14

## 2022-11-14 RX ORDER — LISINOPRIL 5 MG/1
5 TABLET ORAL
Refills: 0 | Status: COMPLETED | COMMUNITY
End: 2022-11-14

## 2022-11-14 RX ORDER — LISINOPRIL 5 MG/1
5 TABLET ORAL DAILY
Qty: 30 | Refills: 0 | Status: ACTIVE | COMMUNITY
Start: 2022-11-14 | End: 1900-01-01

## 2022-11-14 RX ORDER — MEMANTINE HYDROCHLORIDE 5 MG/1
5 TABLET, FILM COATED ORAL DAILY
Refills: 0 | Status: ACTIVE | COMMUNITY
Start: 2022-11-14

## 2022-11-14 RX ORDER — PREDNISONE 5 MG/1
5 TABLET ORAL
Refills: 0 | Status: COMPLETED | COMMUNITY
Start: 2020-09-02 | End: 2022-11-14

## 2022-11-14 RX ORDER — FUROSEMIDE 20 MG/1
20 TABLET ORAL
Qty: 90 | Refills: 0 | Status: COMPLETED | COMMUNITY
Start: 2020-03-24 | End: 2022-11-14

## 2022-11-14 RX ORDER — LISINOPRIL 5 MG/1
5 TABLET ORAL
Refills: 0 | Status: COMPLETED | COMMUNITY
Start: 2020-09-02 | End: 2022-11-14

## 2022-11-14 NOTE — HISTORY OF PRESENT ILLNESS
[FreeTextEntry1] : Ms. QUIGLEY is a 77 year old female with  past medical history of Hypertension, Cardiomyopathy (LVEF 44% in 2016, improved to 50 to 55% in 2016) Hx of PE on Eliquis, Polymyositis, Muscular Dystrophy, Memory loss, Hx of splenectomy and Mitral regurgitation. She is referred by Dr. Indu Agarwal for initial evaluation and management for Mitral regurgitation. She was recently admitted to [facility] from 10/6-10/8 with PNA.  Had TTE in July with Mod-Sev MR, AMERICA during hospitalization with Sev MR.\par \par She presents today with her  and reports she is unsure why she is here or who her cardiologist is.  Her  reports hx of MD and dementia that he reports as stable.  Reports she walks with a walker and does get some SOB at times with exertion. Able to cook and clean at home, with walker. Denies chest pain, palpitations.  Reports occasional swelling to left foot at times.  Denies weight gain and notes she has actually lost weight over the summer.  Not on diuretics.\par \par Of note pt is A&O X 1-2 in office today.

## 2022-11-14 NOTE — CONSULT LETTER
[Dear  ___] : Dear  [unfilled], [Courtesy Letter:] : I had the pleasure of seeing your patient, [unfilled], in my office today. [Please see my note below.] : Please see my note below. [Consult Closing:] : Thank you very much for allowing me to participate in the care of this patient.  If you have any questions, please do not hesitate to contact me. [Sincerely,] : Sincerely, [FreeTextEntry2] : Dr. Indu Agarwal [FreeTextEntry3] : Brendan Fofana MD\par  & \par \par Cardiovascular & Thoracic Surgery\par [facility] \par [address]\par [facility] [address]\par \par

## 2022-11-14 NOTE — PHYSICAL EXAM
[General Appearance - Alert] : alert [Sclera] : the sclera and conjunctiva were normal [Outer Ear] : the ears and nose were normal in appearance [Neck Appearance] : the appearance of the neck was normal [Jugular Venous Distention Increased] : there was no jugular-venous distention [] : no respiratory distress [Respiration, Rhythm And Depth] : normal respiratory rhythm and effort [Exaggerated Use Of Accessory Muscles For Inspiration] : no accessory muscle use [Auscultation Breath Sounds / Voice Sounds] : lungs were clear to auscultation bilaterally [Apical Impulse] : the apical impulse was normal [Heart Rate And Rhythm] : heart rate was normal and rhythm regular [Heart Sounds] : normal S1 and S2 [Bowel Sounds] : normal bowel sounds [Abdomen Soft] : soft [Abdomen Tenderness] : non-tender [Involuntary Movements] : no involuntary movements were seen [No Focal Deficits] : no focal deficits [Oriented To Time, Place, And Person] : oriented to person, place, and time [Impaired Insight] : insight and judgment were intact [Affect] : the affect was normal [Mood] : the mood was normal

## 2022-11-14 NOTE — END OF VISIT
[FreeTextEntry3] : I, Dr. Jurado, personally performed the evaluation and management services for this new patient.  That includes conducting the initial examination, assessing all conditions, and establishing the plan of care.  Today, JOSÉ ANTONIO,  Jose Lamas NP was here to observe my evaluation and management services for this patient to be followed going forward.\par \par \par

## 2022-11-14 NOTE — DATA REVIEWED
[FreeTextEntry1] : 8/5/22 AMERICA revealed Normal mitral valve. Severe mitral regurgitation with posteriorly-directed wall-hugging jet. Normal trileaflet aortic valve. No aortic stenosis. No aortic valve regurgitation seen.  Simple atheroma noted in aortic arch/descending aorta. Normal Left Ventricular Systolic Function,  (EF = 55 to 60%).Trace pulmonic insufficiency is noted.Agitated saline injection revealed bubbles in the left heart, consistent with patent foramen ovale. No pericardial effusion\par \par 7/21/22 TTE revealed LVEF 58%, Mildly dilated LT atrium, Mitral valve leaflets appears mildly thickened. Moderate to severe mitral regurgitation. Mild AR, Mild TR, \par \par 10/6/22 CT chest with contrast revealed Nodular ground glass opacities in the right upper and middle lobes are  likely infectious/inflammatory in etiology. Follow-up CT chest is  suggested 4-6 weeks after treatment to assess for resolution.Several new vertebral body compression fractures since CT chest December 20, 2015, including a severe compression deformity of the T7 vertebral body, and moderate compression deformity of the T9 vertebral body.\par \par

## 2022-11-14 NOTE — ASSESSMENT
[FreeTextEntry1] : I reviewed the cardiac imaging, medical records and reports with patient and discussed the case.  AMERICA from August 2022 reviewed with Dr. Claire Lee at time of visit shows Moderate MR.  Recommend medical management since pt currently w/o signs of HF.  \par \par Plan:\par \par - Stop Norvasc and start Lisinopril 5mg QD\par - Follow up with cardiologist\par - Call with any questions or concerns

## 2022-12-02 ENCOUNTER — EMERGENCY (EMERGENCY)
Facility: HOSPITAL | Age: 77
LOS: 1 days | Discharge: HOME | End: 2022-12-02
Attending: STUDENT IN AN ORGANIZED HEALTH CARE EDUCATION/TRAINING PROGRAM
Payer: COMMERCIAL

## 2022-12-02 VITALS
DIASTOLIC BLOOD PRESSURE: 78 MMHG | HEART RATE: 98 BPM | HEIGHT: 64 IN | RESPIRATION RATE: 18 BRPM | WEIGHT: 85.1 LBS | SYSTOLIC BLOOD PRESSURE: 136 MMHG | OXYGEN SATURATION: 96 % | TEMPERATURE: 98 F

## 2022-12-02 VITALS
RESPIRATION RATE: 18 BRPM | SYSTOLIC BLOOD PRESSURE: 130 MMHG | DIASTOLIC BLOOD PRESSURE: 75 MMHG | TEMPERATURE: 98 F | OXYGEN SATURATION: 97 % | HEART RATE: 92 BPM

## 2022-12-02 DIAGNOSIS — Z90.81 ACQUIRED ABSENCE OF SPLEEN: Chronic | ICD-10-CM

## 2022-12-02 PROCEDURE — 72125 CT NECK SPINE W/O DYE: CPT | Mod: MA

## 2022-12-02 PROCEDURE — 99284 EMERGENCY DEPT VISIT MOD MDM: CPT

## 2022-12-02 PROCEDURE — 99284 EMERGENCY DEPT VISIT MOD MDM: CPT | Mod: 25

## 2022-12-02 PROCEDURE — 72125 CT NECK SPINE W/O DYE: CPT | Mod: 26,MA

## 2022-12-02 RX ORDER — DIAZEPAM 5 MG
2 TABLET ORAL
Qty: 10 | Refills: 0
Start: 2022-12-02

## 2022-12-02 RX ORDER — LIDOCAINE 4 G/100G
1 CREAM TOPICAL ONCE
Refills: 0 | Status: COMPLETED | OUTPATIENT
Start: 2022-12-02 | End: 2022-12-02

## 2022-12-02 RX ORDER — DIAZEPAM 5 MG
2 TABLET ORAL ONCE
Refills: 0 | Status: DISCONTINUED | OUTPATIENT
Start: 2022-12-02 | End: 2022-12-02

## 2022-12-02 RX ADMIN — Medication 2 MILLIGRAM(S): at 20:34

## 2022-12-02 RX ADMIN — LIDOCAINE 1 PATCH: 4 CREAM TOPICAL at 20:34

## 2022-12-02 NOTE — ED PROVIDER NOTE - PATIENT PORTAL LINK FT
You can access the FollowMyHealth Patient Portal offered by [facility] by registering at the following website: [email or web address]. By joining [employer]’s FollowMyHealth portal, you will also be able to view your health information using other applications (apps) compatible with our system.

## 2022-12-02 NOTE — ED PROVIDER NOTE - CLINICAL SUMMARY MEDICAL DECISION MAKING FREE TEXT BOX
Patient presenting with neck pain. no midline deformity  neuro intact, atrumatic  will obtain ct cspine, r.o bony injury, pain control and reassess

## 2022-12-02 NOTE — ED PROVIDER NOTE - NSFOLLOWUPINSTRUCTIONS_ED_ALL_ED_FT
Acute Torticollis, Adult      Torticollis is a condition in which the muscles of the neck tighten (contract) abnormally, causing the neck to twist and the head to move into an unnatural position. Torticollis that develops suddenly is called acute torticollis. People with acute torticollis may have trouble turning their head. The condition can be painful and may range from mild to severe.      What are the causes?    This condition may be caused by:  •Sleeping in an awkward position. This is common.      •Extending or twisting the neck muscles beyond their normal position.      •An injury to the neck muscles.      •An infection.      •A tumor.      •Certain medicines.      •Long-lasting spasms of the neck muscles.      In some cases, the cause may not be known.      What increases the risk?    You are more likely to develop this condition if:  •You have a condition associated with loose ligaments, such as Down syndrome.      •You have a brain condition that affects vision, such as strabismus.        What are the signs or symptoms?    The main symptom of this condition is tilting of the head to one side. Other symptoms include:  •Pain in the neck.      •Trouble turning the head from side to side or up and down.        How is this diagnosed?    This condition may be diagnosed based on:  •A physical exam.      •Your medical history.    •Imaging tests, such as:  •An X-ray.      •An ultrasound.      •A CT scan.      •An MRI.          How is this treated?    Treatment for this condition depends on what is causing the condition. Mild cases may go away without treatment. Treatment for more serious cases may include:  •Medicines or shots to relax the muscles.      •Other medicines, such as antibiotics, to treat the underlying cause.      •Wearing a soft neck collar.      •Physical therapy and stretching exercises to improve movement and strength in your neck.      •Neck massage.      In severe cases, surgery may be needed to repair dislocated or broken bones or to treat nerves in the neck.      Follow these instructions at home:     •Take over-the-counter and prescription medicines only as told by your health care provider.      •Do stretching exercises and massage your neck as told by your health care provider.    •If directed, apply heat to the affected area as often as told by your health care provider. Use the heat source that your health care provider recommends, such as a moist heat pack or a heating pad.  •Place a towel between your skin and the heat source.      •Leave the heat on for 20–30 minutes.      •Remove the heat if your skin turns bright red. This is especially important if you are unable to feel pain, heat, or cold. You have a greater risk of getting burned.        •If you wake up with torticollis after sleeping, check your bed or sleeping area. Look for lumpy pillows or unusual objects. Make sure your bed and sleeping area are comfortable.      •Keep all follow-up visits. This is important.        Contact a health care provider if:    •You have a fever.      •Your symptoms do not improve or they get worse.        Get help right away if:    •You have trouble breathing.      •You make loud, high-pitched sounds when you breathe, most often when you breathe in (stridor).      •You start to drool.      •You have trouble swallowing or pain when swallowing.      •You develop numbness or weakness in your hands or feet.      •You have changes in your speech, understanding, or vision.      •You are in severe pain.      •You cannot move your head or neck.      These symptoms may represent a serious problem that is an emergency. Do not wait to see if the symptoms will go away. Get medical help right away. Call your local emergency services (911 in the [place] Do not drive yourself to the hospital.       Summary    •Torticollis is a condition in which the muscles of the neck tighten (contract) abnormally, causing the neck to twist and the head to move into an unnatural position. Torticollis that develops suddenly is called acute torticollis.      •Treatment for this condition depends on what is causing the condition. Mild cases may go away without treatment.      •Do stretching exercises and massage your neck as told by your health care provider. You may also be instructed to apply heat to the area.      •Contact your health care provider if your symptoms do not improve or they get worse.      This information is not intended to replace advice given to you by your health care provider. Make sure you discuss any questions you have with your health care provider.

## 2022-12-02 NOTE — ED PROVIDER NOTE - OBJECTIVE STATEMENT
77 y.o presenting with neck pain x 2 days. denies trauma, fall, numbness, weakness. 77 y.o w/ pmh of muscular dystrophy presenting with neck pain x 2 days. denies trauma, fall, numbness, weakness, focal deficit

## 2022-12-02 NOTE — ED PROVIDER NOTE - PROGRESS NOTE DETAILS
patient pain improved. ct negative for acute finding. clinically stable. given med, return precautions and instructed to f.u pmd

## 2022-12-02 NOTE — ED ADULT NURSE NOTE - NSIMPLEMENTINTERV_GEN_ALL_ED
Implemented All Universal Safety Interventions:  [place] to call system. Call bell, personal items and telephone within reach. Instruct patient to call for assistance. Room bathroom lighting operational. Non-slip footwear when patient is off stretcher. Physically safe environment: no spills, clutter or unnecessary equipment. Stretcher in lowest position, wheels locked, appropriate side rails in place.

## 2022-12-02 NOTE — ED ADULT TRIAGE NOTE - CHIEF COMPLAINT QUOTE
sent from urgent care for eval 2-3 days of spontaneous rigid and flexed neck that is painful .  denies any trauma/injury

## 2022-12-02 NOTE — ED PROVIDER NOTE - IV ALTEPLASE DOOR HIDDEN
Daily Note     Today's date: 2022  Patient name: Avelino Latham  : 1965  MRN: [id number]  Referring provider: Any Patel  Dx:   Encounter Diagnosis     ICD-10-CM    1  Right shoulder pain, unspecified chronicity  M25 511    2  Complete tear of right rotator cuff, unspecified whether traumatic  M75 121                   Subjective:  Pt reports continued progress  Pt with no new complaints to report  Objective: See treatment diary below      Assessment: Tolerated treatment well  Patient demonstrated fatigue post treatment, exhibited good technique with therapeutic exercises and would benefit from continued PT  Pt is making steady progress in all areas  Plan: Continue per plan of care        Precautions: s/p R shoulder RC repair      Manuals             PROM HK            GH jt mobs all planes HK            Shoulder stretching routine HK                         Ther Ex             Table slides  Flex and ER x30            Pulleys 6'            UBE 3F/3B            Scap 4 Red  3x15            SL ER 3x15            UE alpha 3x            Prone pro/ret 2x30            Modalities             MH 15'  post show

## 2022-12-08 ENCOUNTER — APPOINTMENT (OUTPATIENT)
Dept: NEUROLOGY | Facility: CLINIC | Age: 77
End: 2022-12-08
Payer: MEDICARE

## 2022-12-08 VITALS
HEART RATE: 104 BPM | OXYGEN SATURATION: 99 % | HEIGHT: 61 IN | BODY MASS INDEX: 17.94 KG/M2 | SYSTOLIC BLOOD PRESSURE: 123 MMHG | TEMPERATURE: 96.2 F | DIASTOLIC BLOOD PRESSURE: 82 MMHG | WEIGHT: 95 LBS

## 2022-12-08 PROCEDURE — 99214 OFFICE O/P EST MOD 30 MIN: CPT

## 2022-12-08 RX ORDER — ATORVASTATIN CALCIUM 10 MG/1
10 TABLET, FILM COATED ORAL
Qty: 90 | Refills: 0 | Status: DISCONTINUED | COMMUNITY
Start: 2022-09-19 | End: 2022-12-08

## 2022-12-08 RX ORDER — AZITHROMYCIN 250 MG/1
250 TABLET, FILM COATED ORAL
Qty: 6 | Refills: 0 | Status: ACTIVE | COMMUNITY
Start: 2022-11-26

## 2022-12-08 RX ORDER — METRONIDAZOLE 500 MG/1
500 TABLET ORAL
Qty: 12 | Refills: 0 | Status: ACTIVE | COMMUNITY
Start: 2022-10-08

## 2022-12-08 RX ORDER — LEVOFLOXACIN 500 MG/1
500 TABLET, FILM COATED ORAL
Qty: 4 | Refills: 0 | Status: ACTIVE | COMMUNITY
Start: 2022-10-08

## 2022-12-08 RX ORDER — ALBUTEROL SULFATE 2.5 MG/3ML
(2.5 MG/3ML) SOLUTION RESPIRATORY (INHALATION)
Qty: 150 | Refills: 0 | Status: ACTIVE | COMMUNITY
Start: 2022-10-08

## 2022-12-08 RX ORDER — FAMOTIDINE 20 MG/1
20 TABLET, FILM COATED ORAL
Qty: 60 | Refills: 0 | Status: ACTIVE | COMMUNITY
Start: 2022-10-08

## 2022-12-08 RX ORDER — AMOXICILLIN 500 MG/1
500 CAPSULE ORAL
Qty: 20 | Refills: 0 | Status: ACTIVE | COMMUNITY
Start: 2022-08-10

## 2022-12-08 RX ORDER — CLINDAMYCIN HYDROCHLORIDE 150 MG/1
150 CAPSULE ORAL
Qty: 30 | Refills: 0 | Status: ACTIVE | COMMUNITY
Start: 2022-06-30

## 2022-12-08 NOTE — HISTORY OF PRESENT ILLNESS
[FreeTextEntry1] : The patient has h/o of polymyosistis and is here for memory problem which started few months ago. As per the , she has trouble remembering what was just told to her. She does not have trouble with cooking, cleaning, grooming, dressing or eating. She does not usually go outside by her own due to the weakness in her legs, she uses a walker. She does not get lost. Her  does the shopping and the finances, he has always been doing that in the past.\par \par She does not have any bowel or bladder issues. No focal symptoms of a stroke. There is no known family h/o of dementia. \par \par Son says that the patient had a reaction to Aricept, she was was aggressive. The Aricept was stopped and patient was started the Namenda. \par \par Patient had a fall mechanical and had ICH at [facility], has not had sz, Keppra d/elaine by family due to hallucinations which have now resolved. \par \par No neuro change since last visit.\par

## 2022-12-08 NOTE — ASSESSMENT
[FreeTextEntry1] : \par The patient has memory problems, specifically short term with MMSE 23/30, due to Alzheimers disease \par was on Aricept 5mg but had side effects (aggression), which has been stopped. Cw Namenda 5mg bid.\par Recommend: physical exercise x 3-4 times per week for at least 30 min, intellectual stimulation and Mediterranean diet. \par will repeat MMSE after 2/23\par \par cva on elaquis\par secondary stroke prevention: will restart lipitor 40 mg and cw AC\par ,. HbA1C 6.1\par \par ICH s/p fall, mechanical, seen at [facility]\par was on Keppra for sz prophylaxis, has not had Sz, has SE of hallucination and behavioral chages and stopped by family\par will get repeat CTH to [facility] for interval blood\par \par Polymyositis on steroids\par strength full except bl Iliopsoas 4/5 bl. \par will refer to PT\par \par I spent the time noted on the day of this patient encounter preparing for, providing and documenting the above E/M service and counseling and educate patient on differential, workup, disease course, and treatment/management. Education was provided to the patient during this encounter. All questions and concerns were answered and addressed in detail. The patient verbalized understanding and agreed to plan. Patient was advised to continue to monitor for neurologic symptoms and to notify my office or go to the nearest emergency room if there are any changes. Any orders/referrals and communications were provided as well. \par Side effects of the above medications were discussed in detail including but not limited to applicable black box warning and teratogenicity as appropriate. \par Patient was advised to bring previous records to my office, including CD of imaging, when applicable. \par  \par

## 2022-12-15 NOTE — ED ADULT TRIAGE NOTE - WEIGHT IN KG
----- Message from Tamra Hilton MD sent at 12/14/2022  2:43 PM CST -----  Please do preauth for Reclast and route to Nilda letting her know needed asap due to patient being out of town for [place]. Can you also send patient a message with tel number to schedule infusion for next week hopefully. Thanks!     40.8

## 2022-12-19 ENCOUNTER — EMERGENCY (EMERGENCY)
Facility: HOSPITAL | Age: 77
LOS: 1 days | Discharge: HOME | End: 2022-12-19
Attending: EMERGENCY MEDICINE
Payer: COMMERCIAL

## 2022-12-19 VITALS
HEIGHT: 64 IN | DIASTOLIC BLOOD PRESSURE: 89 MMHG | SYSTOLIC BLOOD PRESSURE: 134 MMHG | HEART RATE: 103 BPM | TEMPERATURE: 98 F | OXYGEN SATURATION: 98 % | WEIGHT: 97 LBS | RESPIRATION RATE: 18 BRPM

## 2022-12-19 DIAGNOSIS — Z90.81 ACQUIRED ABSENCE OF SPLEEN: Chronic | ICD-10-CM

## 2022-12-19 PROCEDURE — 99284 EMERGENCY DEPT VISIT MOD MDM: CPT | Mod: 25

## 2022-12-19 PROCEDURE — 72125 CT NECK SPINE W/O DYE: CPT | Mod: 26,MA

## 2022-12-19 PROCEDURE — 71250 CT THORAX DX C-: CPT | Mod: 26,MA

## 2022-12-19 PROCEDURE — 96372 THER/PROPH/DIAG INJ SC/IM: CPT

## 2022-12-19 PROCEDURE — 70450 CT HEAD/BRAIN W/O DYE: CPT | Mod: MA

## 2022-12-19 PROCEDURE — 99285 EMERGENCY DEPT VISIT HI MDM: CPT

## 2022-12-19 PROCEDURE — 70450 CT HEAD/BRAIN W/O DYE: CPT | Mod: 26,MA

## 2022-12-19 PROCEDURE — 72125 CT NECK SPINE W/O DYE: CPT | Mod: MA

## 2022-12-19 PROCEDURE — 93010 ELECTROCARDIOGRAM REPORT: CPT

## 2022-12-19 PROCEDURE — 93005 ELECTROCARDIOGRAM TRACING: CPT

## 2022-12-19 PROCEDURE — 71250 CT THORAX DX C-: CPT | Mod: MA

## 2022-12-19 RX ORDER — MORPHINE SULFATE 50 MG/1
4 CAPSULE, EXTENDED RELEASE ORAL ONCE
Refills: 0 | Status: DISCONTINUED | OUTPATIENT
Start: 2022-12-19 | End: 2022-12-19

## 2022-12-19 RX ORDER — MORPHINE SULFATE 50 MG/1
2 CAPSULE, EXTENDED RELEASE ORAL ONCE
Refills: 0 | Status: DISCONTINUED | OUTPATIENT
Start: 2022-12-19 | End: 2022-12-19

## 2022-12-19 RX ADMIN — MORPHINE SULFATE 2 MILLIGRAM(S): 50 CAPSULE, EXTENDED RELEASE ORAL at 17:35

## 2022-12-19 RX ADMIN — MORPHINE SULFATE 4 MILLIGRAM(S): 50 CAPSULE, EXTENDED RELEASE ORAL at 22:21

## 2022-12-19 NOTE — ED PROVIDER NOTE - PROGRESS NOTE DETAILS
No acute intracranial abnormality.  Old ischemic changes within the occipital regions bilaterally. Finding on   the right new since October 14, 2020.  Small vessel and atrophic changes.  No acute vertebral fracture, collapse or subluxation.    No pneumothorax.  Acute buckling fracture involving outer wall of upper body of the sternum   without retrosternal hematoma.  Redemonstrated mild nodular groundglass opacities in right upper and   right middle lobes, likely infectious or inflammatory. Pulmonary imaging   follow-up in 6 weeks advised to demonstrate resolution. Stable 3 mm   nodule in left lower lobe.  Reidentified multiple compression deformities of the thoracolumbar spine   as detailed above. Pt is in no distress and requesting to be discharged. pt in company of  who states their son will come and assist mother home.   Pt is stable for discharge and follow up with medical doctor. Pt educated on care and need for follow up. Discussed anticipatory guidance and return precautions. Questions answered. I had a detailed discussion with the patient and/or guardian regarding the historical points, exam findings, and any diagnostic results supporting the discharge diagnosis.

## 2022-12-19 NOTE — ED ADULT NURSE NOTE - OBJECTIVE STATEMENT
As per pt, c/o neck pain s/p trip and fall today hitting the back of her head. C-collar in place by EMS in the field. NO other obvious traumas noted. Pt denies all other symptoms.

## 2022-12-19 NOTE — ED PROVIDER NOTE - NSFOLLOWUPINSTRUCTIONS_ED_ALL_ED_FT
Return if you have pain, weakness, headache vomiting, any concerns.   See your doctor as soon as possible (within 1-2 days).   If you need further assistance for appointments you can contact the [place] Care Coordinator at [phone] or the [facility] Patient Access Services helpline at [phone] to find names/contact #s for a practitioner to follow up with.  Bring your discharge papers / test results with you to any follow up appointments.   In addition our outpatient [facility] is located at [address] [place] [address], tel: [phone].    Take Tylenol l650 mg every 4 ours for pain.

## 2022-12-19 NOTE — ED PROVIDER NOTE - CARE PROVIDER_API CALL
[facility]  [address] [place] [address]  Phone: [phone]  Fax: [phone]  Follow Up Time:

## 2022-12-19 NOTE — ED PROVIDER NOTE - MUSCULOSKELETAL, MLM
C spine tenderness. Spine appears normal, range of motion is not limited, no muscle or joint tenderness

## 2022-12-19 NOTE — ED ADULT TRIAGE NOTE - CHIEF COMPLAINT QUOTE
as per ems pt slipped and fell hit the back of the head . pt c/o neck pain pt came in with the Collar .

## 2022-12-19 NOTE — ED PROVIDER NOTE - OBJECTIVE STATEMENT
77 year old female with muscular dystrophy, polymyositis, and walks with walker is presenting to the ED with chief complaint of neck pain, back pain, and chest pain S/P falling backward and hitting her head. EMS placed a cervical collar on her. No weakness or numbness in leg or arm. No trouble breathing. Pain in chest is relieved when Cervical collar is removed.

## 2022-12-19 NOTE — ED ADULT NURSE NOTE - NS ED NURSE LEVEL OF CONSCIOUSNESS ORIENTATION
HPI:  This is a 25 year old   Obstetric History       T1      L2     SAB0   TAB0   Ectopic0   Molar0   Multiple0   Live Births2     who presents today for her 6 week postpartum exam.  Her pregnancy was uncomplicated.  She had a vaginal  delivery at 36w1d on 2018. Her son is doing well.  She had no laceration. Her bleeding has stopped. She has no  or GI problems. She had intercourse and it was painful. She will use not use contraception.     I have reviewed the patient's current vital signs, allergies, medications, medical, surgical, family, social, and obstetrical histories, updating these as appropriate.  See the EMR for details of this information.       PHYSICAL EXAM:  Vitals:    04/10/18 1454   BP: 90/58   Pulse: 76     General: well developed, well nourished, in no acute distress  Psych: alert and cooperative; normal mood and affect  Neck:  Thyroid not enlarged.  Breast: no masses or tenderness. Nipples are normal.  Abdomen: abdomen soft and non-tender without masses, hepatosplenomegaly or hernias noted   Skin: intact without lesions or rashes     Pelvic Exam:  External genitalia: Appears well healed, atrophic, no lesions.  Speculum: Normal cervix and vagina.  Uterus: Normal size, non-tender.  Adnexa: No mass or tenderness.  Rectum: External exam normal.      ASSESSMENT:    Normal post partum exam   delivery  Dyspareunia    PLAN:  We discussed options including OCPs, Nuva ring, OrthoEvra patch, IUD, Nexplanon, Depo provera, tubal ligation or vasectomy. As far as dyspareunia I think it may be due to atrophic tissue. Lubrication and foreplay were discussed and encouraged. She should call if this continues. I strongly urged her to use contraception until her son is 6 to ideally 12 months old. Her pregnancies were 13 months apart and her second son delivered prematurely by 6 days. A longer interval between pregnancies as well as progesterone IM with the next pregnancy would hopefully  prevent prematurity.     Return in about 1 year (around 4/10/2019) for Annual.   Oriented - self; Oriented - place; Oriented - time

## 2022-12-19 NOTE — ED PROVIDER NOTE - PATIENT PORTAL LINK FT
You can access the FollowMyHealth Patient Portal offered by [facility] by registering at the following website: [email or web address]. By joining [employer]’s FollowMyHealth portal, you will also be able to view your health information using other applications (apps) compatible with our system.

## 2022-12-20 VITALS
HEART RATE: 93 BPM | RESPIRATION RATE: 17 BRPM | OXYGEN SATURATION: 96 % | DIASTOLIC BLOOD PRESSURE: 81 MMHG | TEMPERATURE: 98 F | SYSTOLIC BLOOD PRESSURE: 132 MMHG

## 2022-12-27 NOTE — ED ADULT NURSE NOTE - PAIN: PRESENCE, MLM
complains of pain/discomfort Hydroxychloroquine Pregnancy And Lactation Text: This medication has been shown to cause fetal harm but it isn't assigned a Pregnancy Risk Category. There are small amounts excreted in breast milk.

## 2023-03-16 NOTE — ED ADULT NURSE NOTE - CADM POA CENTRAL LINE
BIBEMS c/o ingestion of ETOH. Per EMS, patient found outside a [employer] next to empty bottles of liquor. Patient admits to drinking alcohol today. No evidence of trauma or bruising on exam, patient changed into a yellow gown w/ no belongings at bedside. No

## 2023-03-24 ENCOUNTER — APPOINTMENT (OUTPATIENT)
Dept: NEUROLOGY | Facility: CLINIC | Age: 78
End: 2023-03-24
Payer: MEDICARE

## 2023-03-24 VITALS
HEIGHT: 61 IN | OXYGEN SATURATION: 98 % | SYSTOLIC BLOOD PRESSURE: 141 MMHG | DIASTOLIC BLOOD PRESSURE: 88 MMHG | TEMPERATURE: 98.3 F | HEART RATE: 95 BPM

## 2023-03-24 DIAGNOSIS — F02.80 ALZHEIMER'S DISEASE, UNSPECIFIED: ICD-10-CM

## 2023-03-24 DIAGNOSIS — I63.9 CEREBRAL INFARCTION, UNSPECIFIED: ICD-10-CM
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
## 2023-03-24 DIAGNOSIS — G30.9 ALZHEIMER'S DISEASE, UNSPECIFIED: ICD-10-CM

## 2023-03-24 DIAGNOSIS — I62.9 NONTRAUMATIC INTRACRANIAL HEMORRHAGE, UNSPECIFIED: ICD-10-CM

## 2023-03-24 PROCEDURE — 99214 OFFICE O/P EST MOD 30 MIN: CPT

## 2023-03-24 RX ORDER — ATORVASTATIN CALCIUM 40 MG/1
40 TABLET, FILM COATED ORAL
Qty: 1 | Refills: 1 | Status: ACTIVE | COMMUNITY
Start: 2020-10-19 | End: 1900-01-01

## 2023-03-24 RX ORDER — MEMANTINE HYDROCHLORIDE 5 MG/1
5 TABLET, FILM COATED ORAL
Qty: 120 | Refills: 5 | Status: ACTIVE | COMMUNITY
Start: 2022-02-15 | End: 1900-01-01

## 2023-03-24 NOTE — DATA REVIEWED
[de-identified] : cth stable from 2020\par CT C spine noted\par PMD note appreciated\par ED note appreciated

## 2023-03-24 NOTE — ASSESSMENT
[FreeTextEntry1] : The patient has memory problems, specifically short term with MMSE 21/30 on MArch 24 2023, finished HS, due to Alzheimers disease \par was on Aricept 5mg but had side effects (aggression), which has been stopped. Will increase Namenda  from 5mg to 10mg bid.\par Recommend: physical exercise x 3-4 times per week for at least 30 min, intellectual stimulation and Mediterranean diet. \par will repeat MMSE after 2/23\par \par cva, now off elaquis\par secondary stroke prevention: will restart lipitor 40 mg and will start asa 81mg as patient is off Elaquis (due to memory issues per )\par ,. HbA1C 6.1\par \par ICH s/p fall, mechanical, seen at [facility]\par was on Keppra for sz prophylaxis, has not had Sz, has SE of hallucination and behavioral chages and stopped by family\par \par Polymyositis, was steroids, now off\par strength full except bl Iliopsoas 4+/5 bl. \par will refer to PT\par \par I spent the time noted on the day of this patient encounter preparing for, providing and documenting the above E/M service and counseling and educate patient on differential, workup, disease course, and treatment/management. Education was provided to the patient during this encounter. All questions and concerns were answered and addressed in detail. The patient verbalized understanding and agreed to plan. Patient was advised to continue to monitor for neurologic symptoms and to notify my office or go to the nearest emergency room if there are any changes. Any orders/referrals and communications were provided as well. \par Side effects of the above medications were discussed in detail including but not limited to applicable black box warning and teratogenicity as appropriate. \par Patient was advised to bring previous records to my office, including CD of imaging, when applicable. \par

## 2023-03-24 NOTE — PHYSICAL EXAM
[General Appearance - Alert] : alert [General Appearance - In No Acute Distress] : in no acute distress [Total Score ___ / 30] : the patient achieved a score of [unfilled] /30 [Date / Time ___ / 5] : date / time [unfilled] / 5 [Place ___ / 5] : place [unfilled] / 5 [Registration ___ / 3] : registration [unfilled] / 3 [Serial Sevens ___/5] : serial sevens [unfilled] / 5 [Naming 2 Objects ___ / 2] : naming two objects [unfilled] / 2 [Repeating a Sentence ___ / 1] : repeating a sentence [unfilled] / 1 [Writing a Sentence ___ / 1] : write sentence [unfilled] / 1 [3-stage Verbal Command ___ / 3] : three-stage verbal command [unfilled] / 3 [Written Command ___ / 1] : written command [unfilled] / 1 [Copy a Design ___ / 1] : copy a design [unfilled] / 1 [Recall ___ / 3] : recall [unfilled] / 3 [FreeTextEntry4] : MMSE 21/30 on MArch 24 2023, finished HS

## 2023-03-24 NOTE — HISTORY OF PRESENT ILLNESS
[FreeTextEntry1] : The patient has h/o of polymyosistis and is here for memory problem which started few months ago. As per the , she has trouble remembering what was just told to her. She does not have trouble with cooking, cleaning, grooming, dressing or eating. She does not usually go outside by her own due to the weakness in her legs, she uses a walker. She does not get lost. Her  does the shopping and the finances, he has always been doing that in the past.\par \par She does not have any bowel or bladder issues. No focal symptoms of a stroke. There is no known family h/o of dementia. \par \par Son says that the patient had a reaction to Aricept, she was was aggressive. The Aricept was stopped and patient was started the Namenda. \par \par Patient had a fall mechanical and had ICH at [facility], has not had sz, Keppra d/elaine by family due to hallucinations which have now resolved. \par \par No neuro change since last visit.\par

## 2023-07-02 ENCOUNTER — INPATIENT (INPATIENT)
Facility: HOSPITAL | Age: 78
LOS: 4 days | Discharge: HOME | DRG: 193 | End: 2023-07-07
Attending: INTERNAL MEDICINE | Admitting: INTERNAL MEDICINE
Payer: COMMERCIAL

## 2023-07-02 VITALS
OXYGEN SATURATION: 100 % | WEIGHT: 89.95 LBS | HEART RATE: 74 BPM | SYSTOLIC BLOOD PRESSURE: 142 MMHG | RESPIRATION RATE: 19 BRPM | DIASTOLIC BLOOD PRESSURE: 81 MMHG | HEIGHT: 61 IN | TEMPERATURE: 98 F

## 2023-07-02 DIAGNOSIS — Z90.81 ACQUIRED ABSENCE OF SPLEEN: Chronic | ICD-10-CM

## 2023-07-02 LAB
ALBUMIN SERPL ELPH-MCNC: 2.6 G/DL — LOW (ref 3.5–5)
ALP SERPL-CCNC: 95 U/L — SIGNIFICANT CHANGE UP (ref 40–120)
ALT FLD-CCNC: 33 U/L DA — SIGNIFICANT CHANGE UP (ref 10–60)
ANION GAP SERPL CALC-SCNC: 5 MMOL/L — SIGNIFICANT CHANGE UP (ref 5–17)
AST SERPL-CCNC: 39 U/L — SIGNIFICANT CHANGE UP (ref 10–40)
BASOPHILS # BLD AUTO: 0.04 K/UL — SIGNIFICANT CHANGE UP (ref 0–0.2)
BASOPHILS NFR BLD AUTO: 0.4 % — SIGNIFICANT CHANGE UP (ref 0–2)
BILIRUB SERPL-MCNC: 0.2 MG/DL — SIGNIFICANT CHANGE UP (ref 0.2–1.2)
BUN SERPL-MCNC: 19 MG/DL — HIGH (ref 7–18)
CALCIUM SERPL-MCNC: 8.4 MG/DL — SIGNIFICANT CHANGE UP (ref 8.4–10.5)
CHLORIDE SERPL-SCNC: 113 MMOL/L — HIGH (ref 96–108)
CO2 SERPL-SCNC: 22 MMOL/L — SIGNIFICANT CHANGE UP (ref 22–31)
CREAT SERPL-MCNC: 0.5 MG/DL — SIGNIFICANT CHANGE UP (ref 0.5–1.3)
EGFR: 98 ML/MIN/1.73M2 — SIGNIFICANT CHANGE UP
EOSINOPHIL # BLD AUTO: 0.37 K/UL — SIGNIFICANT CHANGE UP (ref 0–0.5)
EOSINOPHIL NFR BLD AUTO: 3.8 % — SIGNIFICANT CHANGE UP (ref 0–6)
FLUAV AG NPH QL: SIGNIFICANT CHANGE UP
FLUBV AG NPH QL: SIGNIFICANT CHANGE UP
GLUCOSE SERPL-MCNC: 105 MG/DL — HIGH (ref 70–99)
HCT VFR BLD CALC: 32.4 % — LOW (ref 34.5–45)
HGB BLD-MCNC: 10.5 G/DL — LOW (ref 11.5–15.5)
IMM GRANULOCYTES NFR BLD AUTO: 0.3 % — SIGNIFICANT CHANGE UP (ref 0–0.9)
LYMPHOCYTES # BLD AUTO: 2.11 K/UL — SIGNIFICANT CHANGE UP (ref 1–3.3)
LYMPHOCYTES # BLD AUTO: 21.7 % — SIGNIFICANT CHANGE UP (ref 13–44)
MCHC RBC-ENTMCNC: 29.7 PG — SIGNIFICANT CHANGE UP (ref 27–34)
MCHC RBC-ENTMCNC: 32.4 GM/DL — SIGNIFICANT CHANGE UP (ref 32–36)
MCV RBC AUTO: 91.5 FL — SIGNIFICANT CHANGE UP (ref 80–100)
MONOCYTES # BLD AUTO: 0.78 K/UL — SIGNIFICANT CHANGE UP (ref 0–0.9)
MONOCYTES NFR BLD AUTO: 8 % — SIGNIFICANT CHANGE UP (ref 2–14)
NEUTROPHILS # BLD AUTO: 6.41 K/UL — SIGNIFICANT CHANGE UP (ref 1.8–7.4)
NEUTROPHILS NFR BLD AUTO: 65.8 % — SIGNIFICANT CHANGE UP (ref 43–77)
NRBC # BLD: 0 /100 WBCS — SIGNIFICANT CHANGE UP (ref 0–0)
NT-PROBNP SERPL-SCNC: 1109 PG/ML — HIGH (ref 0–450)
PLATELET # BLD AUTO: 242 K/UL — SIGNIFICANT CHANGE UP (ref 150–400)
POTASSIUM SERPL-MCNC: 3.9 MMOL/L — SIGNIFICANT CHANGE UP (ref 3.5–5.3)
POTASSIUM SERPL-SCNC: 3.9 MMOL/L — SIGNIFICANT CHANGE UP (ref 3.5–5.3)
PROT SERPL-MCNC: 6.9 G/DL — SIGNIFICANT CHANGE UP (ref 6–8.3)
RBC # BLD: 3.54 M/UL — LOW (ref 3.8–5.2)
RBC # FLD: 17.6 % — HIGH (ref 10.3–14.5)
SARS-COV-2 RNA SPEC QL NAA+PROBE: SIGNIFICANT CHANGE UP
SODIUM SERPL-SCNC: 140 MMOL/L — SIGNIFICANT CHANGE UP (ref 135–145)
TROPONIN I, HIGH SENSITIVITY RESULT: 22.6 NG/L — SIGNIFICANT CHANGE UP
WBC # BLD: 9.74 K/UL — SIGNIFICANT CHANGE UP (ref 3.8–10.5)
WBC # FLD AUTO: 9.74 K/UL — SIGNIFICANT CHANGE UP (ref 3.8–10.5)

## 2023-07-02 PROCEDURE — 71045 X-RAY EXAM CHEST 1 VIEW: CPT | Mod: 26

## 2023-07-02 PROCEDURE — 99285 EMERGENCY DEPT VISIT HI MDM: CPT

## 2023-07-02 NOTE — ED ADULT NURSE NOTE - NSFALLUNIVINTERV_ED_ALL_ED
Bed/Stretcher in lowest position, wheels locked, appropriate side rails in place/Call bell, personal items and telephone in reach/Instruct patient to call for assistance before getting out of bed/chair/stretcher/Non-slip footwear applied when patient is off stretcher/[place] to call system/Physically safe environment - no spills, clutter or unnecessary equipment/Purposeful proactive rounding/Room/bathroom lighting operational, light cord in reach

## 2023-07-02 NOTE — ED PROVIDER NOTE - CLINICAL SUMMARY MEDICAL DECISION MAKING FREE TEXT BOX
75-year-old female history of PE 3 years ago not on any current anticoagulants.  We will follow-up diagnostics.  Patient currently in no acute distress pulse ox is 98% on room air. 75-year-old female history of PE 3 years ago not on any current anticoagulants.  We will follow-up diagnostics.  Patient currently in no acute distress pulse ox is 98% on room air.    CTA chest reported No pulmonary embolus or concerning interval change compared to 12/19/2022.    Cardiac risk factors: advance age, HTN, hx of thromboembolic disease (PE). MAR and hospitalist endorsed. Pt agrees with admission for cardio-resp monitoring.  I had a detailed discussion with the patient and/or guardian regarding the historical points, exam findings, and any diagnostic results supporting the admit diagnosis.

## 2023-07-02 NOTE — ED PROVIDER NOTE - OBJECTIVE STATEMENT
77-year-old femalen history of polymyositis, hypertension accompanied with .  Patient states short of breath progressively worsening for past 2 weeks.   states patient complained of shortness of breath and chest pain at 3 PM.  No reported fever, no coughing, no abdominal pain, no vomiting.

## 2023-07-02 NOTE — ED PROVIDER NOTE - NSICDXPASTMEDICALHX_GEN_ALL_CORE_FT
PAST MEDICAL HISTORY:  Anemia     Asthma     Cholelithiasis     Compression fracture     Diverticulosis     Gastritis     Hiatal hernia     History of hypertension     ITP (idiopathic thrombocytopenic purpura)     Muscular dystrophy     Polymyositis on Prednisone    Pulmonary embolism in 11/2015, on Eliquis    Renal cyst, left

## 2023-07-03 DIAGNOSIS — I38 ENDOCARDITIS, VALVE UNSPECIFIED: ICD-10-CM

## 2023-07-03 DIAGNOSIS — J45.909 UNSPECIFIED ASTHMA, UNCOMPLICATED: ICD-10-CM

## 2023-07-03 DIAGNOSIS — I24.9 ACUTE ISCHEMIC HEART DISEASE, UNSPECIFIED: ICD-10-CM

## 2023-07-03 DIAGNOSIS — I20.9 ANGINA PECTORIS, UNSPECIFIED: ICD-10-CM

## 2023-07-03 DIAGNOSIS — I10 ESSENTIAL (PRIMARY) HYPERTENSION: ICD-10-CM

## 2023-07-03 DIAGNOSIS — Z29.9 ENCOUNTER FOR PROPHYLACTIC MEASURES, UNSPECIFIED: ICD-10-CM

## 2023-07-03 LAB
A1C WITH ESTIMATED AVERAGE GLUCOSE RESULT: 5.7 % — HIGH (ref 4–5.6)
ALBUMIN SERPL ELPH-MCNC: 2.5 G/DL — LOW (ref 3.5–5)
ALP SERPL-CCNC: 82 U/L — SIGNIFICANT CHANGE UP (ref 40–120)
ALT FLD-CCNC: 31 U/L DA — SIGNIFICANT CHANGE UP (ref 10–60)
ANION GAP SERPL CALC-SCNC: 3 MMOL/L — LOW (ref 5–17)
AST SERPL-CCNC: 38 U/L — SIGNIFICANT CHANGE UP (ref 10–40)
BASOPHILS # BLD AUTO: 0.04 K/UL — SIGNIFICANT CHANGE UP (ref 0–0.2)
BASOPHILS NFR BLD AUTO: 0.4 % — SIGNIFICANT CHANGE UP (ref 0–2)
BILIRUB SERPL-MCNC: 0.6 MG/DL — SIGNIFICANT CHANGE UP (ref 0.2–1.2)
BUN SERPL-MCNC: 18 MG/DL — SIGNIFICANT CHANGE UP (ref 7–18)
CALCIUM SERPL-MCNC: 8.7 MG/DL — SIGNIFICANT CHANGE UP (ref 8.4–10.5)
CHLORIDE SERPL-SCNC: 112 MMOL/L — HIGH (ref 96–108)
CHOLEST SERPL-MCNC: 83 MG/DL — SIGNIFICANT CHANGE UP
CO2 SERPL-SCNC: 22 MMOL/L — SIGNIFICANT CHANGE UP (ref 22–31)
CREAT SERPL-MCNC: 0.59 MG/DL — SIGNIFICANT CHANGE UP (ref 0.5–1.3)
EGFR: 94 ML/MIN/1.73M2 — SIGNIFICANT CHANGE UP
EOSINOPHIL # BLD AUTO: 0.19 K/UL — SIGNIFICANT CHANGE UP (ref 0–0.5)
EOSINOPHIL NFR BLD AUTO: 2 % — SIGNIFICANT CHANGE UP (ref 0–6)
ESTIMATED AVERAGE GLUCOSE: 117 MG/DL — HIGH (ref 68–114)
GLUCOSE SERPL-MCNC: 141 MG/DL — HIGH (ref 70–99)
HCT VFR BLD CALC: 31.7 % — LOW (ref 34.5–45)
HDLC SERPL-MCNC: 44 MG/DL — LOW
HGB BLD-MCNC: 10.3 G/DL — LOW (ref 11.5–15.5)
IMM GRANULOCYTES NFR BLD AUTO: 0.2 % — SIGNIFICANT CHANGE UP (ref 0–0.9)
LIPID PNL WITH DIRECT LDL SERPL: 31 MG/DL — SIGNIFICANT CHANGE UP
LYMPHOCYTES # BLD AUTO: 2.23 K/UL — SIGNIFICANT CHANGE UP (ref 1–3.3)
LYMPHOCYTES # BLD AUTO: 24.1 % — SIGNIFICANT CHANGE UP (ref 13–44)
MAGNESIUM SERPL-MCNC: 1.7 MG/DL — SIGNIFICANT CHANGE UP (ref 1.6–2.6)
MCHC RBC-ENTMCNC: 29.6 PG — SIGNIFICANT CHANGE UP (ref 27–34)
MCHC RBC-ENTMCNC: 32.5 GM/DL — SIGNIFICANT CHANGE UP (ref 32–36)
MCV RBC AUTO: 91.1 FL — SIGNIFICANT CHANGE UP (ref 80–100)
MONOCYTES # BLD AUTO: 0.89 K/UL — SIGNIFICANT CHANGE UP (ref 0–0.9)
MONOCYTES NFR BLD AUTO: 9.6 % — SIGNIFICANT CHANGE UP (ref 2–14)
NEUTROPHILS # BLD AUTO: 5.9 K/UL — SIGNIFICANT CHANGE UP (ref 1.8–7.4)
NEUTROPHILS NFR BLD AUTO: 63.7 % — SIGNIFICANT CHANGE UP (ref 43–77)
NON HDL CHOLESTEROL: 39 MG/DL — SIGNIFICANT CHANGE UP
NRBC # BLD: 0 /100 WBCS — SIGNIFICANT CHANGE UP (ref 0–0)
PHOSPHATE SERPL-MCNC: 3 MG/DL — SIGNIFICANT CHANGE UP (ref 2.5–4.5)
PLATELET # BLD AUTO: 277 K/UL — SIGNIFICANT CHANGE UP (ref 150–400)
POTASSIUM SERPL-MCNC: 3.6 MMOL/L — SIGNIFICANT CHANGE UP (ref 3.5–5.3)
POTASSIUM SERPL-SCNC: 3.6 MMOL/L — SIGNIFICANT CHANGE UP (ref 3.5–5.3)
PROT SERPL-MCNC: 6.9 G/DL — SIGNIFICANT CHANGE UP (ref 6–8.3)
RAPID RVP RESULT: SIGNIFICANT CHANGE UP
RBC # BLD: 3.48 M/UL — LOW (ref 3.8–5.2)
RBC # FLD: 17.9 % — HIGH (ref 10.3–14.5)
SARS-COV-2 RNA SPEC QL NAA+PROBE: SIGNIFICANT CHANGE UP
SODIUM SERPL-SCNC: 137 MMOL/L — SIGNIFICANT CHANGE UP (ref 135–145)
TRIGL SERPL-MCNC: 40 MG/DL — SIGNIFICANT CHANGE UP
WBC # BLD: 9.27 K/UL — SIGNIFICANT CHANGE UP (ref 3.8–10.5)
WBC # FLD AUTO: 9.27 K/UL — SIGNIFICANT CHANGE UP (ref 3.8–10.5)

## 2023-07-03 PROCEDURE — 93306 TTE W/DOPPLER COMPLETE: CPT | Mod: 26

## 2023-07-03 PROCEDURE — 99223 1ST HOSP IP/OBS HIGH 75: CPT | Mod: GC

## 2023-07-03 PROCEDURE — 71275 CT ANGIOGRAPHY CHEST: CPT | Mod: 26,MA

## 2023-07-03 RX ORDER — PANTOPRAZOLE SODIUM 20 MG/1
40 TABLET, DELAYED RELEASE ORAL
Refills: 0 | Status: DISCONTINUED | OUTPATIENT
Start: 2023-07-03 | End: 2023-07-07

## 2023-07-03 RX ORDER — METOPROLOL TARTRATE 50 MG
12.5 TABLET ORAL
Refills: 0 | Status: DISCONTINUED | OUTPATIENT
Start: 2023-07-03 | End: 2023-07-04

## 2023-07-03 RX ORDER — ACETAMINOPHEN 500 MG
650 TABLET ORAL EVERY 6 HOURS
Refills: 0 | Status: DISCONTINUED | OUTPATIENT
Start: 2023-07-03 | End: 2023-07-07

## 2023-07-03 RX ORDER — FLUDROCORTISONE ACETATE 0.1 MG/1
1 TABLET ORAL
Refills: 0 | DISCHARGE

## 2023-07-03 RX ORDER — AZITHROMYCIN 500 MG/1
500 TABLET, FILM COATED ORAL DAILY
Refills: 0 | Status: COMPLETED | OUTPATIENT
Start: 2023-07-03 | End: 2023-07-06

## 2023-07-03 RX ORDER — AMLODIPINE BESYLATE 2.5 MG/1
1 TABLET ORAL
Qty: 0 | Refills: 0 | DISCHARGE

## 2023-07-03 RX ORDER — ATORVASTATIN CALCIUM 80 MG/1
1 TABLET, FILM COATED ORAL
Qty: 0 | Refills: 0 | DISCHARGE

## 2023-07-03 RX ORDER — ENOXAPARIN SODIUM 100 MG/ML
40 INJECTION SUBCUTANEOUS EVERY 24 HOURS
Refills: 0 | Status: DISCONTINUED | OUTPATIENT
Start: 2023-07-03 | End: 2023-07-07

## 2023-07-03 RX ORDER — MEMANTINE HYDROCHLORIDE 10 MG/1
1 TABLET ORAL
Refills: 0 | DISCHARGE

## 2023-07-03 RX ORDER — MEMANTINE HYDROCHLORIDE 10 MG/1
1 TABLET ORAL
Qty: 0 | Refills: 0 | DISCHARGE

## 2023-07-03 RX ORDER — FLUDROCORTISONE ACETATE 0.1 MG/1
1 TABLET ORAL
Qty: 0 | Refills: 0 | DISCHARGE

## 2023-07-03 RX ORDER — ALBUTEROL 90 UG/1
2 AEROSOL, METERED ORAL EVERY 6 HOURS
Refills: 0 | Status: DISCONTINUED | OUTPATIENT
Start: 2023-07-03 | End: 2023-07-04

## 2023-07-03 RX ORDER — ATORVASTATIN CALCIUM 80 MG/1
20 TABLET, FILM COATED ORAL AT BEDTIME
Refills: 0 | Status: DISCONTINUED | OUTPATIENT
Start: 2023-07-03 | End: 2023-07-07

## 2023-07-03 RX ORDER — ASPIRIN/CALCIUM CARB/MAGNESIUM 324 MG
162 TABLET ORAL ONCE
Refills: 0 | Status: COMPLETED | OUTPATIENT
Start: 2023-07-03 | End: 2023-07-03

## 2023-07-03 RX ORDER — ASPIRIN/CALCIUM CARB/MAGNESIUM 324 MG
81 TABLET ORAL DAILY
Refills: 0 | Status: DISCONTINUED | OUTPATIENT
Start: 2023-07-04 | End: 2023-07-07

## 2023-07-03 RX ORDER — LANOLIN ALCOHOL/MO/W.PET/CERES
1 CREAM (GRAM) TOPICAL
Qty: 0 | Refills: 0 | DISCHARGE

## 2023-07-03 RX ORDER — ATORVASTATIN CALCIUM 80 MG/1
1 TABLET, FILM COATED ORAL
Refills: 0 | DISCHARGE

## 2023-07-03 RX ADMIN — Medication 12.5 MILLIGRAM(S): at 06:22

## 2023-07-03 RX ADMIN — PANTOPRAZOLE SODIUM 40 MILLIGRAM(S): 20 TABLET, DELAYED RELEASE ORAL at 06:22

## 2023-07-03 RX ADMIN — AZITHROMYCIN 500 MILLIGRAM(S): 500 TABLET, FILM COATED ORAL at 21:40

## 2023-07-03 RX ADMIN — ATORVASTATIN CALCIUM 20 MILLIGRAM(S): 80 TABLET, FILM COATED ORAL at 21:40

## 2023-07-03 RX ADMIN — Medication 12.5 MILLIGRAM(S): at 19:22

## 2023-07-03 RX ADMIN — Medication 162 MILLIGRAM(S): at 03:09

## 2023-07-03 NOTE — CONSULT NOTE ADULT - SUBJECTIVE AND OBJECTIVE BOX
A 76 year old female, from home, ambulating w/ a walker,  w/ PMHx of Anemia, Asthma, HTN, ITP, Muscular dystrophy, Polymyositis (prev on prednisone), and PE (prev on Eliquis), presented to the ED due to chest pain and dyspnea that started over 3 weeks ago, but has become more frequent recently. Pain is sternal located, dull in quality, 7/10 in intensity, non radiating, mostly on exertion, but 2 episodes while at rest, lasting between 10 and 20 minutes.     This is associated with dyspnea and palpations. Denies fever, chills, night sweats, heartburn, early satiety, headache, or dizziness. On further questioning, patient mentioned requiring 2 pillows while sleeping due to dyspnea.  A 76 year old female, from home, ambulating w/ a walker,  w/ PMHx of Anemia, Asthma, HTN, ITP, Muscular dystrophy, Polymyositis (prev on prednisone), and PE (prev on Eliquis), presented to the ED due to chest pain and dyspnea that started over 3 weeks ago, but has become more frequent recently. Pain is sternal located, dull in quality, 7/10 in intensity, non radiating, mostly on exertion, but 2 episodes while at rest, lasting between 10 and 20 minutes.     This is associated with dyspnea and palpations. Denies fever, chills, night sweats, heartburn, early satiety, headache, or dizziness. On further questioning, patient mentioned requiring 2 pillows while sleeping due to dyspnea.     Was seen at the bed side: She had not chest pain at the bedside this AM, stated her pain comes and goes, and is alleviated by her grand children tapping on her chest or giving her back rubs.  She was not short of breath. However overall the patient is a very poor historian. States she her mother is now having cardiac issues in her 90s.    REVIEW OF SYSTEMS:    CONSTITUTIONAL: No weakness, fevers or chills  EYES/ENT: No visual changes;  No vertigo or throat pain   NECK: No pain or stiffness  RESPIRATORY: No cough, wheezing, hemoptysis; No shortness of breath  CARDIOVASCULAR: No chest pain or palpitations  GASTROINTESTINAL: No abdominal or epigastric pain. No nausea, vomiting, or hematemesis; No diarrhea or constipation. No melena or hematochezia.  GENITOURINARY: No dysuria, frequency or hematuria  NEUROLOGICAL: No numbness or weakness  SKIN: No itching, rashes        T(C): 36.7 (07-03-23 @ 11:31), Max: 37.1 (07-03-23 @ 08:04)  T(F): 98 (07-03-23 @ 11:31), Max: 98.8 (07-03-23 @ 08:04)  HR: 66 (07-03-23 @ 11:31) (63 - 79)  BP: 107/77 (07-03-23 @ 11:31) (105/63 - 142/81)  RR: 18 (07-03-23 @ 11:31) (18 - 20)  SpO2: 98% (07-03-23 @ 11:31) (98% - 100%)    GENERAL: NAD, lying in bed comfortably  HEAD:  Atraumatic, Normocephalic  EYES: EOMI, PERRLA, conjunctiva and sclera clear  ENT: Moist mucous membranes  NECK: Supple, No JVD  CHEST/LUNG: Clear to auscultation bilaterally; No rales, rhonchi, wheezing, or rubs. Unlabored respirations  HEART: Regular rate and rhythm; soft SIMONE at the apex, rubs, or gallops  ABDOMEN: Bowel sounds present; Soft, Nontender, Nondistended. No hepatomegally  EXTREMITIES:  2+ Peripheral Pulses, brisk capillary refill. No clubbing, cyanosis, or edema  NERVOUS SYSTEM:  Alert & Oriented, speech clear. No deficits   MSK: FROM all 4 extremities, full and equal strength  SKIN: No rashes or lesions

## 2023-07-03 NOTE — CONSULT NOTE ADULT - PROBLEM SELECTOR RECOMMENDATION 9
Patient with risk factors for ACS  She has EF of 55 % -60% on previous Echo, similarly on today's echo   Currently asymptomatic   Telemetry regular rhythm with borderline 1st degree AV block   Trop -ve, unlikely acs  Lipid panel noted  f/u HbA1c  f/u Outpatient

## 2023-07-03 NOTE — PROGRESS NOTE ADULT - PROBLEM SELECTOR PLAN 4
Asymptomatic  Unknown home medications  Will add Albuterol PRN for now  Will continue to monitor Asymptomatic  albuterol PRN

## 2023-07-03 NOTE — H&P ADULT - HISTORY OF PRESENT ILLNESS
A 76 year old female, from home, ambulating w/ a walker,  w/ PMHx of Anemia, Asthma, HTN, ITP, Muscular dystrophy, Polymyositis (prev on prednisone), and PE (prev on Eliquis), presented to the ED due to chest pain and dyspnea that started over 3 weeks ago, but has become more frequent recently. Pain is sternal located, dull in quality, 7/10 in intensity, non radiating, mostly on exertion, but 2 episodes while at rest, lasting between 10 and 20 minutes. This is associated with dyspnea and palpations. Denies fever, chills, night sweats, heartburn, early satiety, headache, or dizziness. On further questioning, patient mentioned requiring 2 pillows while sleeping due to dyspnea. Denies bendopnea. She also mentioned having some discomfort while palpating her chest. No recent travel or sick contact. She does not have any other concerns.

## 2023-07-03 NOTE — H&P ADULT - NSHPPHYSICALEXAM_GEN_ALL_CORE
PHYSICAL EXAMINATION:  GENERAL: NAD, lying comfortable in bed, elderly female  HEAD:  Atraumatic, Normocephalic  EYES:  Conjunctiva and sclera clear, pupils are equal, round, and reactive to light and accommodation.  NECK: Supple, No JVD, trachea is midline, no evidence of thyroid enlargement, no lymphadenopathy or tenderness.  CHEST/LUNG: Clear to auscultation; No rales, rhonchi, wheezing, or rubs  HEART: Regular rate and rhythm; No murmurs, rubs, or gallops  ABDOMEN: Soft, Nontender, Nondistended; Bowel sounds present  NERVOUS SYSTEM:  Alert & Oriented X3; No focal sensory or motor deficits are noted; Recent and remote memory is intact; Appropriate mood and affect.  EXTREMITIES:  2+ Peripheral Pulses, No clubbing, cyanosis, trace pitting edema  SKIN: Warm, dry, and well perfused; Good turgor; No lesions, nodules or rashes are noted.

## 2023-07-03 NOTE — H&P ADULT - ATTENDING COMMENTS
76 year old female w/ PMHx of Anemia, Asthma, HTN, ITP, Muscular dystrophy, Polymyositis (prev on prednisone), and PE (prev on Eliquis), presented  due to increasing frequency of chest pain and dyspnea that started over 3 weeks ago. Admit for chest pain, ACS r/o, bibasilar atelectasis    #Chest pain  #ACS r/o  #Bibasilar atelectasis  #Chronic Anemia  #Asthma, HTN, ITP, Muscular dystrophy, polymyositis  #Hx PE.  EKG showed NSR with Troponin of 22.6. CTA chest reviewed and  negative for PE, but with cardiomegaly, low lung volumes with bibasilar atelectasis. Chronic fracture of the upper sternal body. Lungs clear to auscultation bilaterally. Possible angina vs costochondritis  - Repeat Troponin, EKG  - C/w ASA, Statin, metoprolol  - Possible stress test  - Cardiology consult  - Albuterol PRN, start home asthma meds once confirmed  - Continue home meds once confirmed.  - incentive spirometry  - Tylenol for pain  - DVT ppx 76 year old female w/ PMHx of Anemia, Asthma, HTN, ITP, Muscular dystrophy, Polymyositis (prev on prednisone), and PE (prev on Eliquis), presented  due to increasing frequency of chest pain and dyspnea that started over 3 weeks ago. Admit for chest pain, ACS r/o, bibasilar atelectasis    #Chest pain  #ACS r/o  #Bibasilar atelectasis  #Chronic Anemia  #Asthma, HTN, ITP, Muscular dystrophy, polymyositis  #Hx PE.  EKG showed NSR with Troponin of 22.6. CTA chest reviewed and  negative for PE, but with cardiomegaly, low lung volumes with bibasilar atelectasis. Chronic fracture of the upper sternal body. Lungs clear to auscultation bilaterally. Possible angina vs costochondritis  - Repeat Troponin, EKG  - C/w ASA, Statin, metoprolol  - Possible stress test  - Cardiology consult  - Albuterol PRN, start home asthma meds once confirmed  - incentive spirometry  - Continue home meds once confirmed.  - incentive spirometry  - Tylenol for pain  - DVT ppx

## 2023-07-03 NOTE — PROGRESS NOTE ADULT - ASSESSMENT
A 76 year old female, from home, ambulating w/ a walker,  w/ PMHx of Anemia, Asthma, HTN, ITP, Muscular dystrophy, Polymyositis (prev on prednisone), and PE (prev on Eliquis), presented to the ED due to chest pain and dyspnea that started over 3 weeks ago, but has become more frequent recently. BP of 142/81 mmHg, HR of 74, RR 19, Temp of 98.3 F, saturating at 100% on NRBM 15L. Now off NRBM saturating at 100% on RA. EKG showed NSR with Troponin of 22.6. CTA chest was negative for PE, but showed Cardiomegaly and low lung volumes with bibasilar atelectasis. However, chronic fracture of the upper sternal body noted. Negative for Covid-19 and Influenza. Admitted to telemetry for Unstable angina/ACS workup.     PRIMARY TEAM TO CONFIRM [facility] IN THE MORNING.  WILL BRING MEDICATIONS.  A 76 year old female, from home, ambulating w/ a walker,  w/ PMHx of Anemia, Asthma, HTN, ITP, Muscular dystrophy, Polymyositis (prev on prednisone), and PE (prev on Eliquis), presented to the ED due to chest pain and dyspnea that started over 3 weeks ago, but has become more frequent recently. BP of 142/81 mmHg, HR of 74, RR 19, Temp of 98.3 F, saturating at 100% on NRBM 15L. Now off NRBM saturating at 100% on RA. EKG showed NSR with Troponin of 22.6. CTA chest was negative for PE, but showed Cardiomegaly and low lung volumes with bibasilar atelectasis. However, chronic fracture of the upper sternal body noted. Negative for Covid-19 and Influenza. Admitted to telemetry for Unstable angina/ACS workup.

## 2023-07-03 NOTE — H&P ADULT - ASSESSMENT
A 76 year old female, from home, ambulating w/ a walker,  w/ PMHx of Anemia, Asthma, HTN, ITP, Muscular dystrophy, Polymyositis (prev on prednisone), and PE (prev on Eliquis), presented to the ED due to chest pain and dyspnea that started over 3 weeks ago, but has become more frequent recently. BP of 142/81 mmHg, HR of 74, RR 19, Temp of 98.3 F, saturating at 100% on NRBM 15L. Now off NRBM saturating at 100% on RA. EKG showed NSR with Troponin of 22.6. CTA chest was negative for PE, but showed Cardiomegaly and low lung volumes with bibasilar atelectasis. However, chronic fracture of the upper sternal body noted. Negative for Covid-19 and Influenza. Admitted to telemetry for Unstable angina/ACS workup.     PRIMARY TEAM TO CONFIRM [facility] IN THE MORNING.  WILL BRING MEDICATIONS.

## 2023-07-03 NOTE — H&P ADULT - PROBLEM SELECTOR PLAN 1
Likely unstable angina. Less likely GI related based on Hx.   Patient with risk factors for ACS  Patient with severe MR on previous Echo. Arrhythmia such as AFib to be included in the DDx   However, costochondritis to be considered as mentioned in the Hx above  Currently asymptomatic   Telemetry   s/p  mg x1 in the ED  Vitals q4hr  Will start ASA 81 mg PO on 7/4  Will start Metoprolol 12.5 mg PO BID with parameters  Will start moderate intensity statin   DASH/TLC diet   Lipid panel   HA1c  Will consult Cardio  Will order TTE  BP control Likely unstable angina. Less likely GI related based on Hx.   Patient with risk factors for ACS  Patient with severe MR on previous Echo. Arrhythmia such as AFib to be included in the DDx   However, costochondritis to be considered as mentioned in the Hx above  Currently asymptomatic   Telemetry   s/p  mg x1 in the ED  Vitals q4hr  Will start ASA 81 mg PO on 7/4  Will start Metoprolol 12.5 mg PO BID with parameters  Will start moderate intensity statin   DASH/TLC diet   Lipid panel   HA1c  Cardio Dr Wilson consulted   Possible stress test   No Caffeine   Confirm with Cardio if TTE will be done inpatient  BP control

## 2023-07-03 NOTE — CONSULT NOTE ADULT - PROBLEM SELECTOR RECOMMENDATION 2
Previous Echo noted with preserved EF in 2020 and [place] 2022  Despite severe mitral regurge on [place] of 2022    -Echo appears preserve on pre-valentine read  -no indication for further cardiac work up inpatient  -f/u with Dr. Wilson OP

## 2023-07-03 NOTE — PROGRESS NOTE ADULT - PROBLEM SELECTOR PLAN 2
Will start Metoprolol 12.5 mg PO BID with parameters  BP target <130/90 mmHg  DASH/TLC diet  Adjust medications as needed to meet target. on Metoprolol 12.5 mg PO BID with parameters  BP target <130/90 mmHg  DASH/TLC diet  Adjust medications as needed to meet target.

## 2023-07-03 NOTE — H&P ADULT - PROBLEM SELECTOR PLAN 2
Will start Metoprolol 12.5 mg PO BID with parameters  BP target <130/90 mmHg  DASH/TLC diet  Adjust medications as needed to meet target.

## 2023-07-03 NOTE — PATIENT PROFILE ADULT - FALL HARM RISK - HARM RISK INTERVENTIONS
Assistance with ambulation/Assistance OOB with selected safe patient handling equipment/Communicate Risk of Fall with Harm to all staff/Discuss with provider need for PT consult/Monitor gait and stability/Reinforce activity limits and safety measures with patient and family/Tailored Fall Risk Interventions/Visual Cue: Yellow wristband and red socks/Bed in lowest position, wheels locked, appropriate side rails in place/Call bell, personal items and telephone in reach/Instruct patient to call for assistance before getting out of bed or chair/Non-slip footwear when patient is out of bed/[place] to call system/Physically safe environment - no spills, clutter or unnecessary equipment/Purposeful Proactive Rounding/Room/bathroom lighting operational, light cord in reach

## 2023-07-03 NOTE — H&P ADULT - PROBLEM SELECTOR PLAN 5
IMPROVEDD FOR VTE SCORE OF 1 POINTS   MARCI SCORE (VTE PROPHYLAXIS) SCORE OF 3 POINT, HIGH RISK   Lovenox 40 mg SQ q24hr

## 2023-07-03 NOTE — PROGRESS NOTE ADULT - PROBLEM SELECTOR PLAN 1
Likely unstable angina. Less likely GI related based on Hx.   Patient with risk factors for ACS  Patient with severe MR on previous Echo. Arrhythmia such as AFib to be included in the DDx   However, costochondritis to be considered as mentioned in the Hx above  Currently asymptomatic   Telemetry   s/p  mg x1 in the ED  Vitals q4hr  Will start ASA 81 mg PO on 7/4  Will start Metoprolol 12.5 mg PO BID with parameters  Will start moderate intensity statin   DASH/TLC diet   Lipid panel   HA1c  Cardio Dr Wilson consulted   Possible stress test   No Caffeine   Confirm with Cardio if TTE will be done inpatient  BP control Likely unstable angina. Less likely GI related based on Hx.   Patient with risk factors for ACS  Patient with severe MR on previous Echo. Arrhythmia such as AFib to be included in the DDx   However, costochondritis to be considered as mentioned in the Hx above  Currently asymptomatic   Telemetry   s/p  mg x1 in the ED  Vitals q4hr  Will start ASA 81 mg PO on 7/4  Will start Metoprolol 12.5 mg PO BID with parameters  Will start moderate intensity statin   DASH/TLC diet   Lipid panel   HA1c  Cardio Dr Wilson consulted   Possible stress test   No Caffeine   Confirm with Cardio if TTE will be done inpatient  BP control  CT angio no findings Likely unstable angina. Less likely GI related based on Hx.   Patient with risk factors for ACS  Patient with severe MR on previous Echo. Arrhythmia such as AFib to be included in the DDx   However, costochondritis to be considered as mentioned in the Hx above  Currently asymptomatic   Telemetry   s/p  mg x1 in the ED  Vitals q4hr  on aspirin and metoprolol , statin   DASH/TLC diet   Lipid panel   HA1c  Cardio Dr Wilson consulted   No Caffeine   CT angio no findings  ECHO with no issue as per cardio , however no official read

## 2023-07-03 NOTE — H&P ADULT - PROBLEM SELECTOR PLAN 3
Patient with severe MR on previous Echo   Noted to have Cardiomegaly on CTA chest   BNP 1109  Patient with orthopnea at home  Will order TTE  Will consult Cardio   Rest as above Patient with severe MR on previous Echo   Noted to have Cardiomegaly on CTA chest   BNP 1109  Patient with orthopnea at home  Cardio Dr Wilson consulted   Rest as above

## 2023-07-03 NOTE — PROGRESS NOTE ADULT - PROBLEM SELECTOR PLAN 3
Patient with severe MR on previous Echo   Noted to have Cardiomegaly on CTA chest   BNP 1109  Patient with orthopnea at home  Cardio Dr Wilson consulted   Rest as above Patient

## 2023-07-04 ENCOUNTER — TRANSCRIPTION ENCOUNTER (OUTPATIENT)
Age: 78
End: 2023-07-04

## 2023-07-04 DIAGNOSIS — J45.909 UNSPECIFIED ASTHMA, UNCOMPLICATED: ICD-10-CM

## 2023-07-04 DIAGNOSIS — R07.9 CHEST PAIN, UNSPECIFIED: ICD-10-CM

## 2023-07-04 DIAGNOSIS — R41.0 DISORIENTATION, UNSPECIFIED: ICD-10-CM

## 2023-07-04 DIAGNOSIS — I10 ESSENTIAL (PRIMARY) HYPERTENSION: ICD-10-CM

## 2023-07-04 LAB
ALBUMIN SERPL ELPH-MCNC: 2.6 G/DL — LOW (ref 3.5–5)
ALP SERPL-CCNC: 74 U/L — SIGNIFICANT CHANGE UP (ref 40–120)
ALT FLD-CCNC: 32 U/L DA — SIGNIFICANT CHANGE UP (ref 10–60)
ANION GAP SERPL CALC-SCNC: 5 MMOL/L — SIGNIFICANT CHANGE UP (ref 5–17)
APPEARANCE UR: CLEAR — SIGNIFICANT CHANGE UP
AST SERPL-CCNC: 34 U/L — SIGNIFICANT CHANGE UP (ref 10–40)
BACTERIA # UR AUTO: ABNORMAL /HPF
BILIRUB SERPL-MCNC: 0.7 MG/DL — SIGNIFICANT CHANGE UP (ref 0.2–1.2)
BILIRUB UR-MCNC: NEGATIVE — SIGNIFICANT CHANGE UP
BUN SERPL-MCNC: 15 MG/DL — SIGNIFICANT CHANGE UP (ref 7–18)
CALCIUM SERPL-MCNC: 8.5 MG/DL — SIGNIFICANT CHANGE UP (ref 8.4–10.5)
CHLORIDE SERPL-SCNC: 111 MMOL/L — HIGH (ref 96–108)
CO2 SERPL-SCNC: 24 MMOL/L — SIGNIFICANT CHANGE UP (ref 22–31)
COLOR SPEC: SIGNIFICANT CHANGE UP
CREAT SERPL-MCNC: 0.42 MG/DL — LOW (ref 0.5–1.3)
DIFF PNL FLD: ABNORMAL
EGFR: 102 ML/MIN/1.73M2 — SIGNIFICANT CHANGE UP
EPI CELLS # UR: SIGNIFICANT CHANGE UP /HPF
GLUCOSE SERPL-MCNC: 101 MG/DL — HIGH (ref 70–99)
GLUCOSE UR QL: NEGATIVE — SIGNIFICANT CHANGE UP
GRAM STN FLD: SIGNIFICANT CHANGE UP
HCT VFR BLD CALC: 33.4 % — LOW (ref 34.5–45)
HGB BLD-MCNC: 11 G/DL — LOW (ref 11.5–15.5)
KETONES UR-MCNC: NEGATIVE — SIGNIFICANT CHANGE UP
LEUKOCYTE ESTERASE UR-ACNC: ABNORMAL
MAGNESIUM SERPL-MCNC: 1.8 MG/DL — SIGNIFICANT CHANGE UP (ref 1.6–2.6)
MCHC RBC-ENTMCNC: 29.7 PG — SIGNIFICANT CHANGE UP (ref 27–34)
MCHC RBC-ENTMCNC: 32.9 GM/DL — SIGNIFICANT CHANGE UP (ref 32–36)
MCV RBC AUTO: 90.3 FL — SIGNIFICANT CHANGE UP (ref 80–100)
NITRITE UR-MCNC: NEGATIVE — SIGNIFICANT CHANGE UP
NRBC # BLD: 0 /100 WBCS — SIGNIFICANT CHANGE UP (ref 0–0)
PH UR: 6 — SIGNIFICANT CHANGE UP (ref 5–8)
PHOSPHATE SERPL-MCNC: 2.6 MG/DL — SIGNIFICANT CHANGE UP (ref 2.5–4.5)
PLATELET # BLD AUTO: 287 K/UL — SIGNIFICANT CHANGE UP (ref 150–400)
POTASSIUM SERPL-MCNC: 3.7 MMOL/L — SIGNIFICANT CHANGE UP (ref 3.5–5.3)
POTASSIUM SERPL-SCNC: 3.7 MMOL/L — SIGNIFICANT CHANGE UP (ref 3.5–5.3)
PROT SERPL-MCNC: 7.1 G/DL — SIGNIFICANT CHANGE UP (ref 6–8.3)
PROT UR-MCNC: 30 MG/DL
RBC # BLD: 3.7 M/UL — LOW (ref 3.8–5.2)
RBC # FLD: 17.6 % — HIGH (ref 10.3–14.5)
RBC CASTS # UR COMP ASSIST: SIGNIFICANT CHANGE UP /HPF (ref 0–2)
SODIUM SERPL-SCNC: 140 MMOL/L — SIGNIFICANT CHANGE UP (ref 135–145)
SP GR SPEC: 1.01 — SIGNIFICANT CHANGE UP (ref 1.01–1.02)
SPECIMEN SOURCE: SIGNIFICANT CHANGE UP
UROBILINOGEN FLD QL: NEGATIVE — SIGNIFICANT CHANGE UP
WBC # BLD: 10.61 K/UL — HIGH (ref 3.8–10.5)
WBC # FLD AUTO: 10.61 K/UL — HIGH (ref 3.8–10.5)
WBC UR QL: SIGNIFICANT CHANGE UP /HPF (ref 0–5)

## 2023-07-04 PROCEDURE — 70450 CT HEAD/BRAIN W/O DYE: CPT | Mod: 26

## 2023-07-04 PROCEDURE — 99233 SBSQ HOSP IP/OBS HIGH 50: CPT | Mod: GC

## 2023-07-04 PROCEDURE — 71045 X-RAY EXAM CHEST 1 VIEW: CPT | Mod: 26

## 2023-07-04 RX ORDER — CEFTRIAXONE 500 MG/1
1000 INJECTION, POWDER, FOR SOLUTION INTRAMUSCULAR; INTRAVENOUS EVERY 24 HOURS
Refills: 0 | Status: DISCONTINUED | OUTPATIENT
Start: 2023-07-05 | End: 2023-07-07

## 2023-07-04 RX ORDER — LANOLIN ALCOHOL/MO/W.PET/CERES
5 CREAM (GRAM) TOPICAL AT BEDTIME
Refills: 0 | Status: COMPLETED | OUTPATIENT
Start: 2023-07-04 | End: 2023-07-04

## 2023-07-04 RX ORDER — ALBUTEROL 90 UG/1
2 AEROSOL, METERED ORAL EVERY 6 HOURS
Refills: 0 | Status: DISCONTINUED | OUTPATIENT
Start: 2023-07-04 | End: 2023-07-07

## 2023-07-04 RX ORDER — CEFTRIAXONE 500 MG/1
INJECTION, POWDER, FOR SOLUTION INTRAMUSCULAR; INTRAVENOUS
Refills: 0 | Status: DISCONTINUED | OUTPATIENT
Start: 2023-07-04 | End: 2023-07-07

## 2023-07-04 RX ORDER — LISINOPRIL 2.5 MG/1
5 TABLET ORAL DAILY
Refills: 0 | Status: DISCONTINUED | OUTPATIENT
Start: 2023-07-04 | End: 2023-07-04

## 2023-07-04 RX ORDER — CEFTRIAXONE 500 MG/1
1000 INJECTION, POWDER, FOR SOLUTION INTRAMUSCULAR; INTRAVENOUS ONCE
Refills: 0 | Status: COMPLETED | OUTPATIENT
Start: 2023-07-04 | End: 2023-07-04

## 2023-07-04 RX ADMIN — ATORVASTATIN CALCIUM 20 MILLIGRAM(S): 80 TABLET, FILM COATED ORAL at 22:16

## 2023-07-04 RX ADMIN — ALBUTEROL 2 PUFF(S): 90 AEROSOL, METERED ORAL at 22:20

## 2023-07-04 RX ADMIN — Medication 650 MILLIGRAM(S): at 22:16

## 2023-07-04 RX ADMIN — Medication 650 MILLIGRAM(S): at 23:00

## 2023-07-04 RX ADMIN — AZITHROMYCIN 500 MILLIGRAM(S): 500 TABLET, FILM COATED ORAL at 13:13

## 2023-07-04 RX ADMIN — ENOXAPARIN SODIUM 40 MILLIGRAM(S): 100 INJECTION SUBCUTANEOUS at 05:41

## 2023-07-04 RX ADMIN — PANTOPRAZOLE SODIUM 40 MILLIGRAM(S): 20 TABLET, DELAYED RELEASE ORAL at 05:40

## 2023-07-04 RX ADMIN — Medication 5 MILLIGRAM(S): at 22:16

## 2023-07-04 RX ADMIN — Medication 81 MILLIGRAM(S): at 12:11

## 2023-07-04 RX ADMIN — CEFTRIAXONE 100 MILLIGRAM(S): 500 INJECTION, POWDER, FOR SOLUTION INTRAMUSCULAR; INTRAVENOUS at 18:27

## 2023-07-04 RX ADMIN — Medication 12.5 MILLIGRAM(S): at 05:40

## 2023-07-04 RX ADMIN — ALBUTEROL 2 PUFF(S): 90 AEROSOL, METERED ORAL at 18:26

## 2023-07-04 NOTE — DISCHARGE NOTE PROVIDER - DISCHARGE DATE
[address] [id number] [phone]            Fer Conklin  [address] [facility] [id number]        December 2, 2020    Dear Fer,    While refilling your prescription today, we noticed that you are due for an appointment with your provider.  We will refill your prescription for 30 days, but a follow-up appointment must be made before any additional refills can be approved.     Taking care of your health is important to us and we look forward to seeing you in the near future.  Please call us at [phone] or [phone] (or use [employer]le) to schedule an appointment.     Please disregard this notice if you have already made an appointment.    Sincerely,        [facility]  
07-Jul-2023

## 2023-07-04 NOTE — PROGRESS NOTE ADULT - ASSESSMENT
A 76 year old female, from home, ambulating w/ a walker,  w/ PMHx of Anemia, Asthma, HTN, ITP, Muscular dystrophy, Polymyositis (prev on prednisone), and PE (prev on Eliquis), presented to the ED due to chest pain and dyspnea that started over 3 weeks ago, but has become more frequent recently.    # Chest pain. Telemetry, aspirin statin , ECHO sev,ere mitral regurg, EF 50-55%  #  HTN (hypertension) BP target <130/90 mmHg

## 2023-07-04 NOTE — DISCHARGE NOTE PROVIDER - NSDCCPCAREPLAN_GEN_ALL_CORE_FT
PRINCIPAL DISCHARGE DIAGNOSIS  Diagnosis: Chest pain  Assessment and Plan of Treatment:       SECONDARY DISCHARGE DIAGNOSES  Diagnosis: HTN (hypertension)  Assessment and Plan of Treatment:     Diagnosis: Asthma  Assessment and Plan of Treatment:     Diagnosis: Delirium  Assessment and Plan of Treatment:     Diagnosis: Pneumonia  Assessment and Plan of Treatment:      PRINCIPAL DISCHARGE DIAGNOSIS  Diagnosis: Pneumonia  Assessment and Plan of Treatment:       SECONDARY DISCHARGE DIAGNOSES  Diagnosis: Chest pain  Assessment and Plan of Treatment:     Diagnosis: HTN (hypertension)  Assessment and Plan of Treatment: Your    Diagnosis: Asthma  Assessment and Plan of Treatment:     Diagnosis: Pneumonia  Assessment and Plan of Treatment:      PRINCIPAL DISCHARGE DIAGNOSIS  Diagnosis: Pneumonia  Assessment and Plan of Treatment: You came with chest pain but you also complained of cough with sputum . Xray chest showed left lower lobe of lung small fluid with infection. You were treated for pneumonia with iv antibiotics. Your blood cultures were negative. Urine analysis was negative for infection. Later your symptoms got better. You didnot develop any reaction from ceftriaxone.   You will be discharged on ceftin 500mg twice daily for one day . You already had antibiotic today so you will finish antiobiotic on 7/8.      SECONDARY DISCHARGE DIAGNOSES  Diagnosis: Chest pain  Assessment and Plan of Treatment: You came with chest pain . Troponin was negative. Ultrasound of heart showed ejection fraction 50-55% and moderate mitral regurgitation. You had echo in Aug 2022 with ejection fraction 55-60%. Cardiology consulted. no more intervention. You need to follow up with heart doctor outpatient for further evaulation.    Diagnosis: HTN (hypertension)  Assessment and Plan of Treatment: You were on lisnopril . We discontinued medication due to normal blood pressure. Please follow up with your doctor outpatient to restart home medication if it needed. Monitor your blood pressure if persistently >140/90 then you need to restart your medication    Diagnosis: Asthma  Assessment and Plan of Treatment: continue with home medication    Diagnosis: Prediabetes  Assessment and Plan of Treatment: your hgba1c 5.7. It comes under prediabetes. Normal is 5.6 and less. Watch your diet ,avoid extra sugars in diet . You donot need to be on medication. Follow up with your doctor to repeat it in 3-6 months

## 2023-07-04 NOTE — CHART NOTE - NSCHARTNOTEFT_GEN_A_CORE
Pt found to be more confused this morning. Spoke to patient's son as per son pt does get confused from time to time.   Ordered CTH as it was new change in mental status in-patient

## 2023-07-04 NOTE — DISCHARGE NOTE PROVIDER - DETAILS OF MALNUTRITION DIAGNOSIS/DIAGNOSES
This patient has been assessed with a concern for Malnutrition and was treated during this hospitalization for the following Nutrition diagnosis/diagnoses:     -  07/05/2023: Severe protein-calorie malnutrition   -  07/05/2023: Underweight (BMI < 19)

## 2023-07-04 NOTE — PHYSICAL THERAPY INITIAL EVALUATION ADULT - PERTINENT HX OF CURRENT PROBLEM, REHAB EVAL
Occupational Therapy Daily Treatment     Visit Count: 6  Plan of Care Dates: Initial: 12/7/2017 Through: 2/15/2018     Insurance Information: THERAPY BENEFITS:  PAYOR: MELINA HOLT  VISIT LIMIT: MEDICAL NECESSITY  AUTHORIZATION NEEDED: NO  Next Referring Provider Visit: 12/14/17*    Referred by: Raymond Knight DO  Medical Diagnosis (from order):  Pain of right hand [M79.641]  Dislocation of proximal interphalangeal joint of finger, initial encounter [S63.289A]  Insurance: 1. MELINA/JONATHON  2. N/A    Date of Onset: 12/2/17  Date of Surgery: 12/5/17; Surgery performed: Closed reduction proximal interphalangeal dislocations, right index and long finger.; Physician Guidelines: yes.  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 Treated with bactroban. Detected with prep for joint replacement   • History of blood transfusion 04/2017    for anemia   • Menorrhagia 09/2017    coming for hyst   • Negative History of CA, HTN, DM, CAD, CVA, DVT, Asthma    • Right hip pain 2016    aug 2017 came for hip replacement   • Thyroid mass        SUBJECTIVE   Current Pain:   310.     Relates she fell asleep with dynamic splint on and feeling a little more stiff today  Has been wearing dynamic splint as instructed and feels looser when she comes out of the splint    Functional Change:   Doing HEP as instructed    OBJECTIVE   Range of Motion (degrees):     Left Right Right  Right    Date Initial  Initial  12/18/17 12/26/17   Index Finger          MCP Ext/flex   70 78     PIP Ext/Flex   30 75 70    DIP Ext/Flex   5 35 37    DPC distance         Middle Finger          MCP Ext/Flex   72 83     PIP Ext/Flex   32 78 75     DIP Ext/Flex   15 45 47    DPC distance         Ring Finger          MCP Ext/Flex   77 75     PIP Ext/Flex   46 75 75    DIP Ext/Flex   10 45 37    DPC distance         Small Finger          MCP Ext/Flex   WFL      PIP Ext/Flex   WFL      DIP Ext/Flex   WFL      DPC distance         Thumb          MCP Ext/Flex   WFL      IP Ext/Flex   WFL      Radial abduction   WFL      Palmar abduction   WFL      Tip Opposition   Intact  Intact      Opposition to      Base of 5th digit   Finger tip To MP of small finger    Key: Ext=extension, Flex=flexion, SF=small finger, MCP=metacarpophalangeal joint, PIP=proximal interphalangeal joint, DIP=distal interphalangeal joints; IP=interphalangeal joint; DPC =distal palmar crease, IP=interphalangeal joint ; standard testing positions unless otherwise noted; ranges are reported in active range of motion unless noted as AA=active assistive or P=passive range of motion; * denotes pain   Comments: Uninvolved extremity motion within functional/normal limits.; *denotes pain                    Edema  Date Initial 12/14/17 12/26/17      Not assessed       Index  Right=7.5 cm  Left=5.8cm Right=7.1     Middle  Right =7.5cm  Left=6cm Right=6.9     Ring  Right=6.8cm  Left=5.5cm Right=6.4         Comments:  Currently in Work Program at [address] [place] [address] for a work injury. Have been limiting lift and carry with right hand since injury        Treatment   Therapeutic Exercise:   · PROM and AROM to finger flexion and extension into composite fist, to PIP and DIP flexion  · Moist heat with PROM into flexion    Manual  Joint mobs to PIP for flexion, with 10 second hold followed by oscillations     ADL/Self care:   none     Moist Heat ([id number]):  Patient has been made aware of potential contraindications and possible risks associated with the use of modality and has agreed.  Location: right hand  Position: sitting Temperature: 162° F  Duration: 10 minutes   Results: no change in symptoms immediately following  modality; no adverse reaction to treatment      Current Home Program (not performed this date except as noted above):   Exercise: Date issued Date DC Comments   A/PROM of all digits, tendon glides 12/7/17     Claw to fist and table top to claw 12/11/17     Finger adduction with small yellow sponge   5 reps each, 2-3x/day as pain allows  Composite flexion to fingers with wrist tenodesis with  yellow sponge to hand for light resistance- 10-15 reps 2-3x/day as pain allows    Patient encouraged to gentle AAROM with AROM exercises 12/14/17                       ASSESSMENT   Pain after treatment: NT/10--noted pain with end range PROM into flexion    More stiffness noted today, with decreased ROM noted.  However, with PROM patient seems to have improved flexibility with DIP flexion.  Edema seems to be resolving in all digits      Result of above outlined education: Verbalizes understanding, Demonstrates understanding and Needs reinforcement    Goals:       See eval     PLAN   Monitor and adjust flexion splint as needed  AROM, PROM and AAROM  Light PRE as tolerated  Joint mobs to PIP and DIP as tolerated  Monitor edema  Review HEP and progress as able      THERAPY DAILY BILLING   Primary Insurance:  [email or web address]  Secondary Insurance: N/A    Evaluation Procedures:  No evaluation codes were used on this date of service    Timed Procedures:  Manual Therapy, 10 minutes  Therapeutic Exercise, 25 minutes    Untimed Procedures:  Hot/Cold Pack Therapy--10 minutes, no charge    Total Treatment Time: 35 minutes       Pt is a 76 year old female  w/ PMHx of Anemia, Asthma, HTN, ITP, Muscular dystrophy, Polymyositis (prev on prednisone), and PE (prev on Eliquis), presented to the ED due to chest pain and dyspnea that started over 3 weeks ago, but has become more frequent recently.  EKG showed NSR with Troponin of 22.6. CTA chest was negative for PE, but showed Cardiomegaly and low lung volumes with bibasilar atelectasis. However, chronic fracture of the upper sternal body noted. Negative for Covid-19 and Influenza. Admitted to telemetry for Unstable angina/ACS workup.

## 2023-07-04 NOTE — DISCHARGE NOTE PROVIDER - HOSPITAL COURSE
76 year old female, from home, ambulating w/ a walker,  w/ PMHx of Anemia, Asthma, HTN, ITP, Muscular dystrophy, Polymyositis (prev on prednisone), and PE (prev on Eliquis), presented to the ED due to chest pain and dyspnea that started over 3 weeks ago, but has become more frequent recently. Pt admitted to tele floor for ACS rule out. CTA Chest neg for PE, Cardiomegaly, low lung volumes with bibasilar atelectasis again demonstrated. No events on telemonitor. Later patient developed cough with mucus production . Xray chest showed.... WBC ..... Temp 99.9. Bcx ...........UA............Pt was started on azithromycin and ceftriqaxone . Pt also developed delirium , as per son pt can get confuse at home time to time . CTH was done which didnot show infarct or bleed. 76 year old female, from home, ambulating w/ a walker,  w/ PMHx of Anemia, Asthma, HTN, ITP, Muscular dystrophy, Polymyositis (prev on prednisone), and PE (prev on Eliquis), presented to the ED due to chest pain and dyspnea that started over 3 weeks ago, but has become more frequent recently. Pt admitted to tele floor for ACS rule out. CTA Chest neg for PE, Cardiomegaly, low lung volumes with bibasilar atelectasis again demonstrated. No events on tele monitor. ECHO showed EF 50-55% which was slightly reduced from ECHO in Aug 2022 55-60%. unable to determine diastolic function. Moderate mitral regurgitation. Later patient developed cough with mucus production . CArdio consulted Dr Wilson. No further cardiac intervention required.  Xray chest showed left small pleural effusion with LLL atelectasis / pneumonia. WBC 10 later normalized. Temp 99.9. Bcx negative. UA negative. Pt was started on azithromycin and ceftriaxone . Pt developed delirium likely in the setting of pneumonia but per son pt can get confuse at home time to time . CTH was done which did not  show infarct or bleed. PT consulted recommended PRATIMA but  refused. Pt is back to her baseline mentation. Pt is medically optimized and ready to get discharged. Case discussed with attending. 76 year old female, from home, ambulating w/ a walker,  w/ PMHx of Anemia, Asthma, HTN, ITP, Muscular dystrophy, Polymyositis (prev on prednisone), and PE (prev on Eliquis), presented to the ED due to chest pain and dyspnea that started over 3 weeks ago, but has become more frequent recently. Pt admitted to tele floor for ACS rule out. CTA Chest neg for PE, Cardiomegaly, low lung volumes with bibasilar atelectasis again demonstrated. No events on tele monitor. ECHO showed EF 50-55% which was slightly reduced from ECHO in Aug 2022 55-60%. unable to determine diastolic function. Moderate mitral regurgitation. Later patient developed cough with mucus production . CArdio consulted Dr Wilson. No further cardiac intervention required.  Xray chest showed left small pleural effusion with LLL atelectasis / pneumonia. WBC 10 later normalized. Temp 99.9. Bcx negative. UA negative. Pt was started on azithromycin and ceftriaxone . Pt developed delirium likely in the setting of pneumonia but per son pt can get confuse at home time to time . CTH was done which did not  show infarct or bleed. PT consulted recommended PRATIMA but  refused. Pt is back to her baseline mentation. Pt is medically optimized and ready to get discharged. Case discussed with attending.     Med Attd Addendum    76 year old female w/ PMHx of Anemia, Asthma, HTN, ITP, Muscular dystrophy, Polymyositis (prev on prednisone), and PE (prev on Eliquis)- likely dementia and quite cachectic on physical exam presented  due to increasing frequency of chest pain and dyspnea that started over 3 weeks ago. Admit for chest pain, ACS r/o, bibasilar atelectasis.  CT chest angio without pulmonary embolus and no juwan pneumonia but noted frequently coughing with productive sputum on 5[place], new leukocytosis and low grade elevation in temperature. Full RVP and sputum culture requested. Azithromycin initiated with concern for bacterial bronchitis- ceftriaxone later added with the development of leukocytosis/low grade temperature elevations. Repeat xray with suspected new left lower lung infiltrate    clinically improved after     1. atypical chest pain  2. left lung pna  3. polymyositis on chronic steroids  4. cachexia/frailty  5. dementia- suspected vascular  6. ITP    Has resolution of leukocytosis, improved mental status and  Completing course of abx  -final sputum culture with normal yamila, RVP negative  -complete course of oral abx at home

## 2023-07-04 NOTE — PROGRESS NOTE ADULT - PROBLEM SELECTOR PLAN 3
Patient with severe MR on previous Echo   Noted to have Cardiomegaly on CTA chest   BNP 1109  Patient with orthopnea at home  Cardio Dr Wilson consulted   Rest as above on Metoprolol 12.5 mg PO BID with parameters  BP target <130/90 mmHg  DASH/TLC diet  Adjust medications as needed to meet target.

## 2023-07-04 NOTE — PROGRESS NOTE ADULT - PROBLEM SELECTOR PLAN 2
on Metoprolol 12.5 mg PO BID with parameters  BP target <130/90 mmHg  DASH/TLC diet  Adjust medications as needed to meet target. Came with chest pain   Patient with risk factors for ACS  Patient with severe MR on previous Echo. Arrhythmia such as AFib to be included in the DDx   Currently asymptomatic   Telemetry   s/p  mg x1 in the ED  Vitals q4hr  on aspirin and metoprolol , statin   DASH/TLC diet   LDL 31  HA1c 5.7  Cardio Dr Wilson consulted   CT angio no findings  ECHO severe mitral regurg, EF 50-55%  DC metoprolol started on admission due to ACS but since it has ruled out

## 2023-07-04 NOTE — PROGRESS NOTE ADULT - SUBJECTIVE AND OBJECTIVE BOX
PGY-1 Progress Note discussed with attending    PAGER #: [] TILL 5:00 PM  PLEASE CONTACT ON CALL TEAM:  - On Call Team (Please refer to Ava) FROM 5:00 PM - 8:30PM  - Nightfloat Team FROM 8:30 -7:30 AM    CHIEF COMPLAINT & BRIEF HOSPITAL COURSE:    INTERVAL HPI/OVERNIGHT EVENTS:   No acute overnight events. On bedside examination, pt was confused and not aware of where she was and what year it was. Her son was contacted and informed that this was not out of the normal for her in the mornings and is compounded stressful situations.     REVIEW OF SYSTEMS:  CONSTITUTIONAL: No fever, weight loss, or fatigue. Complained of neck pain.  RESPIRATORY: No cough, wheezing, chills or hemoptysis; No shortness of breath  CARDIOVASCULAR: No chest pain, palpitations, dizziness, or leg swelling  GASTROINTESTINAL: No abdominal pain. No nausea, vomiting, or hematemesis; No diarrhea or constipation. No melena or hematochezia.  GENITOURINARY: No dysuria or hematuria, urinary frequency  NEUROLOGICAL: No headaches, memory loss, loss of strength, numbness, or tremors  SKIN: No itching, burning, rashes, or lesions     Vital Signs Last 24 Hrs  T(C): 36.6 (04 Jul 2023 11:13), Max: 37.7 (03 Jul 2023 16:20)  T(F): 97.9 (04 Jul 2023 11:13), Max: 99.9 (03 Jul 2023 16:20)  HR: 73 (04 Jul 2023 11:13) (73 - 83)  BP: 121/70 (04 Jul 2023 11:13) (113/73 - 144/86)  BP(mean): --  RR: 18 (04 Jul 2023 11:13) (18 - 19)  SpO2: 95% (04 Jul 2023 11:13) (95% - 100%)    Parameters below as of 04 Jul 2023 11:13  Patient On (Oxygen Delivery Method): room air        PHYSICAL EXAMINATION:  GENERAL: NAD  HEAD:  Atraumatic, Normocephalic  EYES:  conjunctiva and sclera clear  NECK: Supple, No JVD, Normal thyroid  CHEST/LUNG: Clear to auscultation. Clear to percussion bilaterally; No rales, rhonchi, wheezing, or rubs  HEART: Regular rate and rhythm; No murmurs, rubs, or gallops  ABDOMEN: Soft, Nontender, Nondistended; Bowel sounds present  NERVOUS SYSTEM:  Alert & Oriented X1,  not to time or place  EXTREMITIES:  2+ Peripheral Pulses, No clubbing, cyanosis, or edema  SKIN: warm dry                          11.0   10.61 )-----------( 287      ( 04 Jul 2023 06:37 )             33.4     07-04    140  |  111<H>  |  15  ----------------------------<  101<H>  3.7   |  24  |  0.42<L>    Ca    8.5      04 Jul 2023 06:37  Phos  2.6     07-04  Mg     1.8     07-04    TPro  7.1  /  Alb  2.6<L>  /  TBili  0.7  /  DBili  x   /  AST  34  /  ALT  32  /  AlkPhos  74  07-04    LIVER FUNCTIONS - ( 04 Jul 2023 06:37 )  Alb: 2.6 g/dL / Pro: 7.1 g/dL / ALK PHOS: 74 U/L / ALT: 32 U/L DA / AST: 34 U/L / GGT: x                   CAPILLARY BLOOD GLUCOSE      RADIOLOGY & ADDITIONAL TESTS:

## 2023-07-04 NOTE — DISCHARGE NOTE PROVIDER - NSDCMRMEDTOKEN_GEN_ALL_CORE_FT
Florinef Acetate 0.1 mg oral tablet: 1 orally once a day  Lipitor 40 mg oral tablet: 1 tab(s) orally once a day  lisinopril 5 mg oral tablet: 1 orally once a day  Namenda 5 mg oral tablet: 1 orally 2 times a day   Florinef Acetate 0.1 mg oral tablet: 1 orally once a day  Lipitor 40 mg oral tablet: 1 tab(s) orally once a day  Namenda 5 mg oral tablet: 1 orally 2 times a day   cefuroxime 500 mg oral tablet: 1 tab(s) orally 2 times a day  Florinef Acetate 0.1 mg oral tablet: 1 orally once a day  Lipitor 40 mg oral tablet: 1 tab(s) orally once a day  Namenda 5 mg oral tablet: 1 orally 2 times a day

## 2023-07-04 NOTE — PHYSICAL THERAPY INITIAL EVALUATION ADULT - DIAGNOSIS, PT EVAL
Pt present with impairment in muscle strength and endurance affecting her ability to perform bed mobility and transfers.

## 2023-07-04 NOTE — PROGRESS NOTE ADULT - SUBJECTIVE AND OBJECTIVE BOX
PATIENT SEEN AND EXAMINED ON :- 7/4/23  DATE OF SERVICE:     7/4/23        Interim events noted,Labs ,Radiological studies and Cardiology tests reviewed .    MR#[id number]  PATIENT NAME:-[facility] COURSE: HPI:  A 76 year old female, from home, ambulating w/ a walker,  w/ PMHx of Anemia, Asthma, HTN, ITP, Muscular dystrophy, Polymyositis (prev on prednisone), and PE (prev on Eliquis), presented to the ED due to chest pain and dyspnea that started over 3 weeks ago, but has become more frequent recently. Pain is sternal located, dull in quality, 7/10 in intensity, non radiating, mostly on exertion, but 2 episodes while at rest, lasting between 10 and 20 minutes. This is associated with dyspnea and palpations. Denies fever, chills, night sweats, heartburn, early satiety, headache, or dizziness. On further questioning, patient mentioned requiring 2 pillows while sleeping due to dyspnea. Denies bendopnea. She also mentioned having some discomfort while palpating her chest. No recent travel or sick contact. She does not have any other concerns.   (03 Jul 2023 04:15)      INTERIM EVENTS:Patient seen at bedside ,interim events noted.      PMH -reviewed admission note, no change since admission  HEART FAILURE: Acute[ ]Chronic[ ] Systolic[ ] Diastolic[ ] Combined Systolic and Diastolic[ ]  CAD[ ] CABG[ ] PCI[ ]  DEVICES[ ] PPM[ ] ICD[ ] ILR[ ]  ATRIAL FIBRILLATION[ ] Paroxysmal[ ] Permanent[ ] CHADS2-[  ]  JORY[ ] CKD1[ ] CKD2[ ] CKD3[ ] CKD4[ ] ESRD[ ]  COPD[ ] HTN[ ]   DM[ ] Type1[ ] Type 2[ ]   CVA[ ] Paresis[ ]    AMBULATION: Assisted[ ] Cane/walker[ ] Independent[ ]    MEDICATIONS  (STANDING):  albuterol    90 MICROgram(s) HFA Inhaler 2 Puff(s) Inhalation every 6 hours  aspirin  chewable 81 milliGRAM(s) Oral daily  atorvastatin 20 milliGRAM(s) Oral at bedtime  azithromycin   Tablet 500 milliGRAM(s) Oral daily  cefTRIAXone   IVPB      enoxaparin Injectable 40 milliGRAM(s) SubCutaneous every 24 hours  melatonin 5 milliGRAM(s) Oral at bedtime  pantoprazole    Tablet 40 milliGRAM(s) Oral before breakfast    MEDICATIONS  (PRN):  acetaminophen     Tablet .. 650 milliGRAM(s) Oral every 6 hours PRN Temp greater or equal to 38C (100.4F), Mild Pain (1 - 3)            REVIEW OF SYSTEMS:  Constitutional: [ ] fever, [ ]weight loss,  [ ]fatigue [ ]weight gain  Eyes: [ ] visual changes  Respiratory: [ ]shortness of breath;  [ ] cough, [ ]wheezing, [ ]chills, [ ]hemoptysis  Cardiovascular: [ ] chest pain, [ ]palpitations, [ ]dizziness,  [ ]leg swelling[ ]orthopnea[ ]PND  Gastrointestinal: [ ] abdominal pain, [ ]nausea, [ ]vomiting,  [ ]diarrhea [ ]Constipation [ ]Melena  Genitourinary: [ ] dysuria, [ ] hematuria [ ]Calvert  Neurologic: [ ] headaches [ ] tremors[ ]weakness [ ]Paralysis Right[ ] Left[ ]  Skin: [ ] itching, [ ]burning, [ ] rashes  Endocrine: [ ] heat or cold intolerance  Musculoskeletal: [ ] joint pain or swelling; [ ] muscle, back, or extremity pain  Psychiatric: [ ] depression, [ ]anxiety, [ ]mood swings, or [ ]difficulty sleeping  Hematologic: [ ] easy bruising, [ ] bleeding gums    [ ] All remaining systems negative except as per above.   [ ]Unable to obtain.  [x] No change in ROS since admission      Vital Signs Last 24 Hrs  T(C): 37.2 (04 Jul 2023 20:40), Max: 37.6 (04 Jul 2023 05:13)  T(F): 99 (04 Jul 2023 20:40), Max: 99.6 (04 Jul 2023 05:13)  HR: 85 (04 Jul 2023 20:40) (73 - 85)  BP: 132/71 (04 Jul 2023 20:40) (113/73 - 144/86)  BP(mean): --  RR: 18 (04 Jul 2023 20:40) (18 - 19)  SpO2: 99% (04 Jul 2023 20:40) (95% - 100%)    Parameters below as of 04 Jul 2023 20:40  Patient On (Oxygen Delivery Method): nasal cannula  O2 Flow (L/min): 2    I&O's Summary    03 Jul 2023 07:01  -  04 Jul 2023 07:00  --------------------------------------------------------  IN: 430 mL / OUT: 0 mL / NET: 430 mL        PHYSICAL EXAM:  General: No acute distress BMI-  HEENT: EOMI, PERRL  Neck: Supple, [ ] JVD  Lungs: Equal air entry bilaterally; [ ] rales [ ] wheezing [ ] rhonchi  Heart: Regular rate and rhythm; [x ] murmur   2/6 [ x] systolic [ ] diastolic [ ] radiation[ ] rubs [ ]  gallops  Abdomen: Nontender, bowel sounds present  Extremities: No clubbing, cyanosis, [ ] edema [ ]Pulses  equal and intact  Nervous system:  Alert & Oriented X3, no focal deficits  Psychiatric: Normal affect  Skin: No rashes or lesions    LABS:  07-04    140  |  111<H>  |  15  ----------------------------<  101<H>  3.7   |  24  |  0.42<L>    Ca    8.5      04 Jul 2023 06:37  Phos  2.6     07-04  Mg     1.8     07-04    TPro  7.1  /  Alb  2.6<L>  /  TBili  0.7  /  DBili  x   /  AST  34  /  ALT  32  /  AlkPhos  74  07-04    Creatinine Trend: 0.42<--, 0.59<--, 0.50<--                        11.0   10.61 )-----------( 287      ( 04 Jul 2023 06:37 )             33.4     Patient name: DERRICK QUIGLEY  YOB: 1945   Age: 77 (F)   MR#: [id number]  Study Date: 7/3/2023  Location: [address] [place], [place] [address]Sonographer: Farhat Guzmán CONNIE  Study quality: Technically good  Referring Physician:  JERZY PRADHAN MD  Blood Pressure: 107/77 mmHg  Height: 154 cm  Weight: 40 kg  BSA: 1.3 m2  ------------------------------------------------------------------------    PROCEDURE: Transthoracic echocardiogram with 2-D, M-Mode  and complete spectral and color flow Doppler.  INDICATION: Chest pain, unspecified (R07.9)  HISTORY:  ------------------------------------------------------------------------  DIMENSIONS:  Dimensions:     Normal Values:  LA:     2.8 cm    2.0 - 4.0 cm  Ao:     3.4 cm    2.0 - 3.8 cm  SEPTUM: 1.0 cm    0.6 - 1.2 cm  PWT:    1.0 cm    0.6 - 1.1 cm  LVIDd:  3.6 cm    3.0 - 5.6 cm  LVIDs:  2.5 cm    1.8 - 4.0 cm      Derived Variables:  LVMI: 81 g/m2  RWT: 0.55  Ejection Fraction Visual Estimate: 50-55 %    ------------------------------------------------------------------------  OBSERVATIONS:  Mitral Valve: Normal mitral valve. Mild posterior mitral  annular calcification. Moderate mitral regurgitation.  Severity of mitral regurgitation may be underestimated due  to Coanda effect.  Aortic Root: Aortic Root: 3.4 cm. Ascending aorta not well  seen.  Aortic Valve: Trileaflet aortic valve. No aortic stenosis.  No aortic valve regurgitation seen.  Left Atrium: Normal left atrium.  LA volume index = 21  cc/m2.  Left Ventricle: Low-normal Left Ventricular Systolic  Function,  (EF = 50-55% by visual estimation) No regional  wall motion abnormalities. Borderline increased left  ventricular wall thickness.  The diastolic function is  indeterminate based on current diagnostic criteria.  Right Heart: Normal right atrium. Right ventricle not well  visualized. Normal tricuspid valve. Mild tricuspid  regurgitation. Normal pulmonic valve. Trace pulmonic  insufficiency is noted.  Pericardium/PleuraNormal pericardium with no pericardial  effusion.  Hemodynamic: RAPressure is 8 mm Hg. RV systolic pressure  is normal at  31 mm Hg.  ------------------------------------------------------------------------  CONCLUSIONS:  1. Normal mitral valve. Mild posterior mitral annular  calcification. Moderate mitral regurgitation.  Severity of  mitral regurgitation may be underestimated due to Coanda  effect.  2. Trileaflet aortic valve. No aortic stenosis. No aortic  valve regurgitation seen.  3. Borderline increased left ventricular wall thickness.  4. Low-normal Left Ventricular Systolic Function,  (EF =  50-55% by visual estimation) No regional wall motion  abnormalities.  5. The diastolic function is indeterminate based on current  diagnostic criteria.  6. Right ventricle not well visualized.    *** Compared with [facility] report of 8/5/2022, current study is  a TTE - severity of mitral regurgitation may be  underestimated due to eccentric jet; EF is low-normal on  current study.  ------------------------------------------------------------------------  Confirmed on  7/4/2023 - 09:25:29 by Pretty Neumann MD  ------------------------------------------------------------------------

## 2023-07-04 NOTE — DISCHARGE NOTE PROVIDER - CARE PROVIDER_API CALL
Reinaldo Kasper  [facility]  [address] [place] [address]  Phone: [phone]  Fax: [phone]  Follow Up Time:     Anuj Wilson  Cardiology  [address] [place] [address]  Phone: [phone]  Fax: [phone]  Follow Up Time:

## 2023-07-04 NOTE — PROGRESS NOTE ADULT - ASSESSMENT
A 76 year old female, from home, ambulating w/ a walker,  w/ PMHx of Anemia, Asthma, HTN, ITP, Muscular dystrophy, Polymyositis (prev on prednisone), and PE (prev on Eliquis), presented to the ED due to chest pain and dyspnea that started over 3 weeks ago, but has become more frequent recently. BP of 142/81 mmHg, HR of 74, RR 19, Temp of 98.3 F, saturating at 100% on NRBM 15L. Now off NRBM saturating at 100% on RA. EKG showed NSR with Troponin of 22.6. CTA chest was negative for PE, but showed Cardiomegaly and low lung volumes with bibasilar atelectasis. However, chronic fracture of the upper sternal body noted. Negative for Covid-19 and Influenza. Admitted to telemetry for Unstable angina/ACS workup.

## 2023-07-04 NOTE — PROGRESS NOTE ADULT - PROBLEM SELECTOR PLAN 1
Likely unstable angina. Less likely GI related based on Hx.   Patient with risk factors for ACS  Patient with severe MR on previous Echo. Arrhythmia such as AFib to be included in the DDx   However, costochondritis to be considered as mentioned in the Hx above  Currently asymptomatic   Telemetry   s/p  mg x1 in the ED  Vitals q4hr  on aspirin and metoprolol , statin   DASH/TLC diet   Lipid panel-. HDL low  HA1c -> 5.7  Cardio Dr Wilson consulted   No Caffeine   CT angio no findings  ECHO with no issue as per cardio , however no official read -Pt found to be confused this morning  -New leukocytosis, temp 99.9 overnight   -Could be metabolic encephalopathy as per son pt does get confused time to time at home as well   -Sending sepsis work up, BCx , UA, Ux xray chest  -CTH neg for infarct or bleed   -Will start on ceftriaxone once sepsis work up sent

## 2023-07-05 DIAGNOSIS — R05.8 OTHER SPECIFIED COUGH: ICD-10-CM

## 2023-07-05 DIAGNOSIS — J18.9 PNEUMONIA, UNSPECIFIED ORGANISM: ICD-10-CM

## 2023-07-05 DIAGNOSIS — R26.2 DIFFICULTY IN WALKING, NOT ELSEWHERE CLASSIFIED: ICD-10-CM

## 2023-07-05 LAB
ALBUMIN SERPL ELPH-MCNC: 2.4 G/DL — LOW (ref 3.5–5)
ALP SERPL-CCNC: 69 U/L — SIGNIFICANT CHANGE UP (ref 40–120)
ALT FLD-CCNC: 28 U/L DA — SIGNIFICANT CHANGE UP (ref 10–60)
ANION GAP SERPL CALC-SCNC: 5 MMOL/L — SIGNIFICANT CHANGE UP (ref 5–17)
AST SERPL-CCNC: 30 U/L — SIGNIFICANT CHANGE UP (ref 10–40)
BILIRUB SERPL-MCNC: 0.4 MG/DL — SIGNIFICANT CHANGE UP (ref 0.2–1.2)
BUN SERPL-MCNC: 18 MG/DL — SIGNIFICANT CHANGE UP (ref 7–18)
CALCIUM SERPL-MCNC: 8.5 MG/DL — SIGNIFICANT CHANGE UP (ref 8.4–10.5)
CHLORIDE SERPL-SCNC: 112 MMOL/L — HIGH (ref 96–108)
CO2 SERPL-SCNC: 24 MMOL/L — SIGNIFICANT CHANGE UP (ref 22–31)
CREAT SERPL-MCNC: 0.47 MG/DL — LOW (ref 0.5–1.3)
CULTURE RESULTS: SIGNIFICANT CHANGE UP
EGFR: 99 ML/MIN/1.73M2 — SIGNIFICANT CHANGE UP
GLUCOSE SERPL-MCNC: 99 MG/DL — SIGNIFICANT CHANGE UP (ref 70–99)
HCT VFR BLD CALC: 32.5 % — LOW (ref 34.5–45)
HGB BLD-MCNC: 10.7 G/DL — LOW (ref 11.5–15.5)
MAGNESIUM SERPL-MCNC: 1.8 MG/DL — SIGNIFICANT CHANGE UP (ref 1.6–2.6)
MCHC RBC-ENTMCNC: 29.5 PG — SIGNIFICANT CHANGE UP (ref 27–34)
MCHC RBC-ENTMCNC: 32.9 GM/DL — SIGNIFICANT CHANGE UP (ref 32–36)
MCV RBC AUTO: 89.5 FL — SIGNIFICANT CHANGE UP (ref 80–100)
NRBC # BLD: 0 /100 WBCS — SIGNIFICANT CHANGE UP (ref 0–0)
PHOSPHATE SERPL-MCNC: 2.9 MG/DL — SIGNIFICANT CHANGE UP (ref 2.5–4.5)
PLATELET # BLD AUTO: 308 K/UL — SIGNIFICANT CHANGE UP (ref 150–400)
POTASSIUM SERPL-MCNC: 3.9 MMOL/L — SIGNIFICANT CHANGE UP (ref 3.5–5.3)
POTASSIUM SERPL-SCNC: 3.9 MMOL/L — SIGNIFICANT CHANGE UP (ref 3.5–5.3)
PROT SERPL-MCNC: 6.9 G/DL — SIGNIFICANT CHANGE UP (ref 6–8.3)
RBC # BLD: 3.63 M/UL — LOW (ref 3.8–5.2)
RBC # FLD: 17.6 % — HIGH (ref 10.3–14.5)
S PNEUM AG UR QL: NEGATIVE — SIGNIFICANT CHANGE UP
SODIUM SERPL-SCNC: 141 MMOL/L — SIGNIFICANT CHANGE UP (ref 135–145)
SPECIMEN SOURCE: SIGNIFICANT CHANGE UP
WBC # BLD: 7.8 K/UL — SIGNIFICANT CHANGE UP (ref 3.8–10.5)
WBC # FLD AUTO: 7.8 K/UL — SIGNIFICANT CHANGE UP (ref 3.8–10.5)

## 2023-07-05 PROCEDURE — 99232 SBSQ HOSP IP/OBS MODERATE 35: CPT | Mod: GC

## 2023-07-05 RX ADMIN — Medication 650 MILLIGRAM(S): at 12:54

## 2023-07-05 RX ADMIN — Medication 650 MILLIGRAM(S): at 06:56

## 2023-07-05 RX ADMIN — ATORVASTATIN CALCIUM 20 MILLIGRAM(S): 80 TABLET, FILM COATED ORAL at 21:09

## 2023-07-05 RX ADMIN — Medication 650 MILLIGRAM(S): at 08:15

## 2023-07-05 RX ADMIN — ALBUTEROL 2 PUFF(S): 90 AEROSOL, METERED ORAL at 15:00

## 2023-07-05 RX ADMIN — ALBUTEROL 2 PUFF(S): 90 AEROSOL, METERED ORAL at 21:10

## 2023-07-05 RX ADMIN — CEFTRIAXONE 100 MILLIGRAM(S): 500 INJECTION, POWDER, FOR SOLUTION INTRAMUSCULAR; INTRAVENOUS at 17:36

## 2023-07-05 RX ADMIN — Medication 650 MILLIGRAM(S): at 13:30

## 2023-07-05 RX ADMIN — ALBUTEROL 2 PUFF(S): 90 AEROSOL, METERED ORAL at 10:17

## 2023-07-05 RX ADMIN — PANTOPRAZOLE SODIUM 40 MILLIGRAM(S): 20 TABLET, DELAYED RELEASE ORAL at 06:29

## 2023-07-05 RX ADMIN — AZITHROMYCIN 500 MILLIGRAM(S): 500 TABLET, FILM COATED ORAL at 12:52

## 2023-07-05 RX ADMIN — Medication 81 MILLIGRAM(S): at 12:52

## 2023-07-05 RX ADMIN — ENOXAPARIN SODIUM 40 MILLIGRAM(S): 100 INJECTION SUBCUTANEOUS at 06:29

## 2023-07-05 RX ADMIN — ALBUTEROL 2 PUFF(S): 90 AEROSOL, METERED ORAL at 06:29

## 2023-07-05 NOTE — DIETITIAN NUTRITION RISK NOTIFICATION - ADDITIONAL COMMENTS/DIETITIAN RECOMMENDATIONS
Malnutrition Diagnosis Parameters: severe muscle wasting, severe fat loss, cachectic, BMI=16.9, poor intake x ? duration, debility

## 2023-07-05 NOTE — DIETITIAN INITIAL EVALUATION ADULT - FEEDING ASSISTANCE
Provide food choices within diet Rx as available/updated; Nursing to continue feeding assistance and encouragement as needed  Provide food choices within diet Rx as available/updated; Nursing to continue feeding assistance and encouragement as needed.  Provide food choices within diet Rx as available/updated; Nursing to continue feeding assistance and encouragement as needed

## 2023-07-05 NOTE — DIETITIAN INITIAL EVALUATION ADULT - OTHER INFO
Pt lives home PTA, alert, verbally responsive, but limited information obtained as pt complaining of nervous, wants to go home ( team aware), lunch tray untouched, refusing to eat at present, declining "Service Recovery", <25% intake at breakfast despite of assistance per nursing; no GI distress at present, Unknown food allergies per Chart  Pt lives home PTA, alert, verbally responsive, but limited information obtained as pt complaining of very nervous, wants to go home ( team aware), lunch tray untouched, refusing to eat at present, declining "Service Recovery", <25% intake at breakfast despite of assistance per nursing; no GI distress at present, Unknown food allergies per Chart

## 2023-07-05 NOTE — PROGRESS NOTE ADULT - ATTENDING COMMENTS
Patient seen/evaluated at bedside on 7/3/23. I agree with the resident progress note/outlined plan of care. My independent findings and conclusions are documented.    S: reports productive cough x 1 week. Bedside RN reports patient coughing throughout the day    76 year old female w/ PMHx of Anemia, Asthma, HTN, ITP, Muscular dystrophy, Polymyositis (prev on prednisone), and PE (prev on Eliquis)- likely dementia and quite cachectic on physical exam presented  due to increasing frequency of chest pain and dyspnea that started over 3 weeks ago. Admit for chest pain, ACS r/o, bibasilar atelectasis.  CT chest angio without pulmonary embolus and no juwan pneumonia. Noted frequently coughing with productive sputum on 5North. Full RVP and sputum culture requested. Azithromycin initiated with concern for bacterial bronchitis.     1. atypical chest pain  2. bronchitis  3. polymyositis on chronic steroids  4. cachexia  5. dementia  6. ITP    low threshold to escalate steroid dosing in light of acute illness and chronic usage  -f/u cardiology recs  -TTE results  -full RVP requested- isolate until full results known  -MRSA swab  -sputum culture requested- adjust abx as indicated  -nutrition consult
Patient was seen and examined with  at bedside. Complains of not feeling well, cough has worsened with more productive sputum. Denies chest pain. AAOx1-2, needed re-orientation of place. Was reported by residents that patient was confused this morning but  reports she has been having memory loss and been confused at home time to time     ICU Vital Signs Last 24 Hrs  T(C): 36.6 (04 Jul 2023 17:30), Max: 37.6 (03 Jul 2023 20:33)  T(F): 97.8 (04 Jul 2023 17:30), Max: 99.7 (03 Jul 2023 20:33)  HR: 78 (04 Jul 2023 17:30) (73 - 83)  BP: 136/75 (04 Jul 2023 17:30) (113/73 - 144/86)  BP(mean): --  ABP: --  ABP(mean): --  RR: 18 (04 Jul 2023 17:30) (18 - 19)  SpO2: 100% (04 Jul 2023 17:30) (95% - 100%)    O2 Parameters below as of 04 Jul 2023 17:30  Patient On (Oxygen Delivery Method): nasal cannula  O2 Flow (L/min): 2      P/E:  NAD  AAOx1-2, no focal deficit   Right sided crepitation in lower zone   S1S2 WNL  Abd soft, non tender, BS present   BLLE no edema or calf tenderness     Labs noted. I personally interpreted lab results (CBC, metabolic panel, UA, blood sugar) and incorporated in my assessment/plan as below  Reviewed CT head and CXR    A/P:  Possible CAP with mild low grade temp, leukocytosis, increased productive sputum - CXR noted possible right lower zone infiltrate, Cont Ceftriaxone and Azithromycin. Obtain cultures. follow up sputum studies and RVP    CT head shows occipitotemporal infarcts. Mental status at baseline now as per  at bedside. NO acute delirium. Possible vascular dementia at baseline    ACS ruled out     Echo result noted, Severe mitral regurgitation. Follow up with cardiology. No pulm edema     Rest of the management as above
Patient seen/evaluated at bedside on 7/6/2023. I agree with the resident progress note/outlined plan of care. My independent findings and conclusions are documented.    S: States cough is improved today. No sob, fevers/chills.    CT head: chronic bilateral occipitotemporal infarcts    TTE: 50-55%, moderate mitral regurgitative (severity may be underreported due to effect),     76 year old female w/ PMHx of Anemia, Asthma, HTN, ITP, Muscular dystrophy, Polymyositis (prev on prednisone), and PE (prev on Eliquis)- likely dementia and quite cachectic on physical exam presented  due to increasing frequency of chest pain and dyspnea that started over 3 weeks ago. Admit for chest pain, ACS r/o, bibasilar atelectasis.  CT chest angio without pulmonary embolus and no juwan pneumonia but noted frequently coughing with productive sputum on 5North, new leukocytosis and low grade elevation in temperature.    Full RVP and sputum culture requested. Azithromycin initiated with concern for bacterial bronchitis- ceftriaxone later added with the development of leukocytosis/low grade temperature elevations     1. atypical chest pain  2. bronchitis, possibly early PNA  3. polymyositis on chronic steroids  4. cachexia/frailty  5. dementia- suspected vascular  6. ITP    Has resolution of leukocytosis, improved mental status and  Completing course of abx  -low threshold for empiric steroids with concern for adrenal insufficiency- patient not currently on steroids in outpatient setting but was managed  -f/u final sputum culture  -nutrition consult   -spouse would like patient to return home  -Goals of care conversation

## 2023-07-05 NOTE — PROGRESS NOTE ADULT - SUBJECTIVE AND OBJECTIVE BOX
PGY-1 Progress Note discussed with attending    PAGER #: [] TILL 5:00 PM  PLEASE CONTACT ON CALL TEAM:  - On Call Team (Please refer to Ava) FROM 5:00 PM - 8:30PM  - Nightfloat Team FROM 8:30 -7:30 AM    CHIEF COMPLAINT & BRIEF HOSPITAL COURSE:    INTERVAL HPI/OVERNIGHT EVENTS:   No acute overnight events. On bedside examination pt was uncomfortable.      REVIEW OF SYSTEMS:  CONSTITUTIONAL: Dizziness  RESPIRATORY: Productive cough  CARDIOVASCULAR: No chest pain, palpitations, dizziness, or leg swelling  GASTROINTESTINAL: No abdominal pain. No nausea, vomiting, or hematemesis; No diarrhea or constipation. No melena or hematochezia.  GENITOURINARY: No dysuria or hematuria, urinary frequency  NEUROLOGICAL: No headaches, memory loss, loss of strength, numbness, or tremors  SKIN: No itching, burning, rashes, or lesions     Vital Signs Last 24 Hrs  T(C): 37 (05 Jul 2023 11:21), Max: 37.2 (04 Jul 2023 20:40)  T(F): 98.6 (05 Jul 2023 11:21), Max: 99 (04 Jul 2023 20:40)  HR: 88 (05 Jul 2023 11:21) (78 - 88)  BP: 115/85 (05 Jul 2023 11:21) (106/74 - 136/75)  BP(mean): --  RR: 19 (05 Jul 2023 11:21) (18 - 20)  SpO2: 95% (05 Jul 2023 11:21) (95% - 100%)    Parameters below as of 05 Jul 2023 11:21  Patient On (Oxygen Delivery Method): room air        PHYSICAL EXAMINATION:  GENERAL: uncomfortablr  HEAD:  Atraumatic, Normocephalic  EYES:  conjunctiva and sclera clear  NECK: Supple, No JVD, Normal thyroid  CHEST/LUNG: Diffuse bilateral ronchi  HEART: Regular rate and rhythm; No murmurs, rubs, or gallops  ABDOMEN: Soft, Nontender, Nondistended; Bowel sounds present  NERVOUS SYSTEM:  Alert & Oriented X3,    EXTREMITIES:  2+ Peripheral Pulses 1+ pitting edema  SKIN: warm dry                          10.7   7.80  )-----------( 308      ( 05 Jul 2023 06:27 )             32.5     07-05    141  |  112<H>  |  18  ----------------------------<  99  3.9   |  24  |  0.47<L>    Ca    8.5      05 Jul 2023 06:27  Phos  2.9     07-05  Mg     1.8     07-05    TPro  6.9  /  Alb  2.4<L>  /  TBili  0.4  /  DBili  x   /  AST  30  /  ALT  28  /  AlkPhos  69  07-05    LIVER FUNCTIONS - ( 05 Jul 2023 06:27 )  Alb: 2.4 g/dL / Pro: 6.9 g/dL / ALK PHOS: 69 U/L / ALT: 28 U/L DA / AST: 30 U/L / GGT: x                   CAPILLARY BLOOD GLUCOSE      RADIOLOGY & ADDITIONAL TESTS:

## 2023-07-05 NOTE — PHARMACOTHERAPY INTERVENTION NOTE - COMMENTS
Modified "Keflex" allergy history to state that patient tolerated ceftriaxone during this admission.    Boo Hahn, PharmD, [facility]  Clinical Pharmacy Specialist, Infectious Diseases  Tele-Antimicrobial Stewardship Program (Tele-ASP)  Tele-ASP Phone: [phone]

## 2023-07-05 NOTE — PROGRESS NOTE ADULT - PROBLEM SELECTOR PLAN 1
Suspected pneumonia  CXR showed small opacity in left lower lobe  Blood cultures pending - CXR showed small pleural effusion on left with atelectasis /pneumonia   Blood cultures pending result  - on abx cef and azithromycin

## 2023-07-05 NOTE — DIETITIAN INITIAL EVALUATION ADULT - ADD RECOMMEND
Severe Malnutrition identified; May adjust food consistency as needed/as medically feasible  Severe Malnutrition identified; May adjust food consistency as needed. Severe Malnutrition identified; May adjust food consistency as needed

## 2023-07-05 NOTE — DIETITIAN INITIAL EVALUATION ADULT - FACTORS AFF FOOD INTAKE
acute on chronic comorbidities including delirium, cardiac issues/change in mental status/Yazdanism/ethnic/cultural/personal food preferences

## 2023-07-05 NOTE — DIETITIAN INITIAL EVALUATION ADULT - PROBLEM/PLAN-3
A patient with abdominal pain and an abnormal CT suggesting colitis   to continue Bentyl and to be referred to Gastroenterology 
DISPLAY PLAN FREE TEXT

## 2023-07-05 NOTE — DIETITIAN INITIAL EVALUATION ADULT - PERTINENT LABORATORY DATA
07-05    141  |  112<H>  |  18  ----------------------------<  99  3.9   |  24  |  0.47<L>    Ca    8.5      05 Jul 2023 06:27  Phos  2.9     07-05  Mg     1.8     07-05    TPro  6.9  /  Alb  2.4<L>  /  TBili  0.4  /  DBili  x   /  AST  30  /  ALT  28  /  AlkPhos  69  07-05  A1C with Estimated Average Glucose Result: 5.7 % (07-03-23 @ 08:59)

## 2023-07-05 NOTE — DIETITIAN INITIAL EVALUATION ADULT - PERTINENT MEDS FT
MEDICATIONS  (STANDING):  albuterol    90 MICROgram(s) HFA Inhaler 2 Puff(s) Inhalation every 6 hours  aspirin  chewable 81 milliGRAM(s) Oral daily  atorvastatin 20 milliGRAM(s) Oral at bedtime  azithromycin   Tablet 500 milliGRAM(s) Oral daily  cefTRIAXone   IVPB 1000 milliGRAM(s) IV Intermittent every 24 hours  cefTRIAXone   IVPB      enoxaparin Injectable 40 milliGRAM(s) SubCutaneous every 24 hours  pantoprazole    Tablet 40 milliGRAM(s) Oral before breakfast    MEDICATIONS  (PRN):  acetaminophen     Tablet .. 650 milliGRAM(s) Oral every 6 hours PRN Temp greater or equal to 38C (100.4F), Mild Pain (1 - 3)

## 2023-07-05 NOTE — DIETITIAN NUTRITION RISK NOTIFICATION - TREATMENT: THE FOLLOWING DIET HAS BEEN RECOMMENDED
Continue diet Rx as ordered:  Diet, DASH/TLC:   Sodium & Cholesterol Restricted  No Caffeine  Supplement Feeding Modality:  Oral  Ensure Enlive Cans or Servings Per Day:  1       Frequency:  Two Times a day (07-03-23 @ 18:27) [Active]

## 2023-07-05 NOTE — PROGRESS NOTE ADULT - PROBLEM SELECTOR PLAN 2
-Pt found to be confused this morning  -New leukocytosis, temp 99.9 overnight   -Could be metabolic encephalopathy as per son pt does get confused time to time at home as well   -Sending sepsis work up, BCx , UA, Ux xray chest  -CTH neg for infarct or bleed   -Will start on ceftriaxone once sepsis work up sent -Pt found to be confused 7/4   -Could be metabolic encephalopathy in the setting of pneumonia but as per son pt does get confused time to time at home as well   -UA neg   -Bcx pending result  -CTH neg for infarct or bleed

## 2023-07-05 NOTE — PROGRESS NOTE ADULT - PROBLEM SELECTOR PLAN 3
Came with chest pain   Patient with risk factors for ACS  Patient with severe MR on previous Echo. Arrhythmia such as AFib to be included in the DDx   Currently asymptomatic   Telemetry   s/p  mg x1 in the ED  Vitals q4hr  on aspirin and statin   DASH/TLC diet   LDL 31  HA1c 5.7  Cardio Dr Wilson consulted   CT angio no findings  ECHO severe mitral regurg, EF 50-55%

## 2023-07-05 NOTE — DIETITIAN INITIAL EVALUATION ADULT - PROBLEM SELECTOR PLAN 3
Patient with severe MR on previous Echo   Noted to have Cardiomegaly on CTA chest   BNP 1109  Patient with orthopnea at home  Cardio Dr Wilson consulted   Rest as above

## 2023-07-05 NOTE — DIETITIAN INITIAL EVALUATION ADULT - CONTINUE CURRENT NUTRITION CARE PLAN
with Ensure Enlive  1can ( 240ml ) x bid ( 700 kcal, 40 g protein)/yes with Ensure Enlive  1can ( 240ml ) x bid ( 700 kcal, 40 g protein)./yes

## 2023-07-05 NOTE — PROGRESS NOTE ADULT - PROBLEM SELECTOR PLAN 2
on Metoprolol 12.5 mg PO BID with parameters  BP target <130/90 mmHg  DASH/TLC diet  Adjust medications as needed to meet target.

## 2023-07-05 NOTE — PROGRESS NOTE ADULT - SUBJECTIVE AND OBJECTIVE BOX
PATIENT SEEN AND EXAMINED ON :- 7/5/23  DATE OF SERVICE:   7/5/23          Interim events noted,Labs ,Radiological studies and Cardiology tests reviewed .    MR#[id number]  PATIENT NAME:-[facility] COURSE: HPI:  A 76 year old female, from home, ambulating w/ a walker,  w/ PMHx of Anemia, Asthma, HTN, ITP, Muscular dystrophy, Polymyositis (prev on prednisone), and PE (prev on Eliquis), presented to the ED due to chest pain and dyspnea that started over 3 weeks ago, but has become more frequent recently. Pain is sternal located, dull in quality, 7/10 in intensity, non radiating, mostly on exertion, but 2 episodes while at rest, lasting between 10 and 20 minutes. This is associated with dyspnea and palpations. Denies fever, chills, night sweats, heartburn, early satiety, headache, or dizziness. On further questioning, patient mentioned requiring 2 pillows while sleeping due to dyspnea. Denies bendopnea. She also mentioned having some discomfort while palpating her chest. No recent travel or sick contact. She does not have any other concerns.   (03 Jul 2023 04:15)      INTERIM EVENTS:Patient seen at bedside ,interim events noted.      PMH -reviewed admission note, no change since admission  HEART FAILURE: Acute[ ]Chronic[ ] Systolic[ ] Diastolic[ ] Combined Systolic and Diastolic[ ]  CAD[ ] CABG[ ] PCI[ ]  DEVICES[ ] PPM[ ] ICD[ ] ILR[ ]  ATRIAL FIBRILLATION[ ] Paroxysmal[ ] Permanent[ ] CHADS2-[  ]  JORY[ ] CKD1[ ] CKD2[ ] CKD3[ ] CKD4[ ] ESRD[ ]  COPD[ ] HTN[ ]   DM[ ] Type1[ ] Type 2[ ]   CVA[ ] Paresis[ ]    AMBULATION: Assisted[ ] Cane/walker[ ] Independent[ ]    MEDICATIONS  (STANDING):  albuterol    90 MICROgram(s) HFA Inhaler 2 Puff(s) Inhalation every 6 hours  aspirin  chewable 81 milliGRAM(s) Oral daily  atorvastatin 20 milliGRAM(s) Oral at bedtime  azithromycin   Tablet 500 milliGRAM(s) Oral daily  cefTRIAXone   IVPB 1000 milliGRAM(s) IV Intermittent every 24 hours  cefTRIAXone   IVPB      enoxaparin Injectable 40 milliGRAM(s) SubCutaneous every 24 hours  pantoprazole    Tablet 40 milliGRAM(s) Oral before breakfast    MEDICATIONS  (PRN):  acetaminophen     Tablet .. 650 milliGRAM(s) Oral every 6 hours PRN Temp greater or equal to 38C (100.4F), Mild Pain (1 - 3)            REVIEW OF SYSTEMS:  Constitutional: [ ] fever, [ ]weight loss,  [ ]fatigue [ ]weight gain  Eyes: [ ] visual changes  Respiratory: [ ]shortness of breath;  [ ] cough, [ ]wheezing, [ ]chills, [ ]hemoptysis  Cardiovascular: [ ] chest pain, [ ]palpitations, [ ]dizziness,  [ ]leg swelling[ ]orthopnea[ ]PND  Gastrointestinal: [ ] abdominal pain, [ ]nausea, [ ]vomiting,  [ ]diarrhea [ ]Constipation [ ]Melena  Genitourinary: [ ] dysuria, [ ] hematuria [ ]Calvert  Neurologic: [ ] headaches [ ] tremors[ ]weakness [ ]Paralysis Right[ ] Left[ ]  Skin: [ ] itching, [ ]burning, [ ] rashes  Endocrine: [ ] heat or cold intolerance  Musculoskeletal: [ ] joint pain or swelling; [ ] muscle, back, or extremity pain  Psychiatric: [ ] depression, [ ]anxiety, [ ]mood swings, or [ ]difficulty sleeping  Hematologic: [ ] easy bruising, [ ] bleeding gums    [ ] All remaining systems negative except as per above.   [ ]Unable to obtain.  [x] No change in ROS since admission      Vital Signs Last 24 Hrs  T(C): 36.6 (05 Jul 2023 16:59), Max: 37.2 (04 Jul 2023 20:40)  T(F): 97.9 (05 Jul 2023 16:59), Max: 99 (04 Jul 2023 20:40)  HR: 93 (05 Jul 2023 16:59) (81 - 93)  BP: 135/85 (05 Jul 2023 16:59) (106/74 - 135/85)  BP(mean): --  RR: 18 (05 Jul 2023 16:59) (18 - 20)  SpO2: 100% (05 Jul 2023 16:59) (95% - 100%)    Parameters below as of 05 Jul 2023 16:59  Patient On (Oxygen Delivery Method): room air      I&O's Summary    05 Jul 2023 07:01  -  05 Jul 2023 20:23  --------------------------------------------------------  IN: 295 mL / OUT: 800 mL / NET: -505 mL        PHYSICAL EXAM:  General: No acute distress BMI-  HEENT: EOMI, PERRL  Neck: Supple, [ ] JVD  Lungs: Equal air entry bilaterally; [ ] rales [ ] wheezing [ ] rhonchi  Heart: Regular rate and rhythm; [x ] murmur   2/6 [ x] systolic [ ] diastolic [ ] radiation[ ] rubs [ ]  gallops  Abdomen: Nontender, bowel sounds present  Extremities: No clubbing, cyanosis, [ ] edema [ ]Pulses  equal and intact  Nervous system:  Alert & Oriented X3, no focal deficits  Psychiatric: Normal affect  Skin: No rashes or lesions    LABS:  07-05    141  |  112<H>  |  18  ----------------------------<  99  3.9   |  24  |  0.47<L>    Ca    8.5      05 Jul 2023 06:27  Phos  2.9     07-05  Mg     1.8     07-05    TPro  6.9  /  Alb  2.4<L>  /  TBili  0.4  /  DBili  x   /  AST  30  /  ALT  28  /  AlkPhos  69  07-05    Creatinine Trend: 0.47<--, 0.42<--, 0.59<--, 0.50<--                        10.7   7.80  )-----------( 308      ( 05 Jul 2023 06:27 )             32.5

## 2023-07-05 NOTE — DIETITIAN INITIAL EVALUATION ADULT - PROBLEM SELECTOR PLAN 1
Likely unstable angina. Less likely GI related based on Hx.   Patient with risk factors for ACS  Patient with severe MR on previous Echo. Arrhythmia such as AFib to be included in the DDx   However, costochondritis to be considered as mentioned in the Hx above  Currently asymptomatic   Telemetry   s/p  mg x1 in the ED  Vitals q4hr  Will start ASA 81 mg PO on 7/4  Will start Metoprolol 12.5 mg PO BID with parameters  Will start moderate intensity statin   DASH/TLC diet   Lipid panel   HA1c  Cardio Dr Wilson consulted   Possible stress test   No Caffeine   Confirm with Cardio if TTE will be done inpatient  BP control

## 2023-07-06 ENCOUNTER — TRANSCRIPTION ENCOUNTER (OUTPATIENT)
Age: 78
End: 2023-07-06

## 2023-07-06 LAB
ALBUMIN SERPL ELPH-MCNC: 2.5 G/DL — LOW (ref 3.5–5)
ALP SERPL-CCNC: 74 U/L — SIGNIFICANT CHANGE UP (ref 40–120)
ALT FLD-CCNC: 30 U/L DA — SIGNIFICANT CHANGE UP (ref 10–60)
ANION GAP SERPL CALC-SCNC: 6 MMOL/L — SIGNIFICANT CHANGE UP (ref 5–17)
AST SERPL-CCNC: 29 U/L — SIGNIFICANT CHANGE UP (ref 10–40)
BILIRUB SERPL-MCNC: 0.3 MG/DL — SIGNIFICANT CHANGE UP (ref 0.2–1.2)
BUN SERPL-MCNC: 10 MG/DL — SIGNIFICANT CHANGE UP (ref 7–18)
CALCIUM SERPL-MCNC: 8.4 MG/DL — SIGNIFICANT CHANGE UP (ref 8.4–10.5)
CHLORIDE SERPL-SCNC: 112 MMOL/L — HIGH (ref 96–108)
CO2 SERPL-SCNC: 23 MMOL/L — SIGNIFICANT CHANGE UP (ref 22–31)
CREAT SERPL-MCNC: 0.42 MG/DL — LOW (ref 0.5–1.3)
EGFR: 102 ML/MIN/1.73M2 — SIGNIFICANT CHANGE UP
GLUCOSE SERPL-MCNC: 100 MG/DL — HIGH (ref 70–99)
HCT VFR BLD CALC: 33.8 % — LOW (ref 34.5–45)
HGB BLD-MCNC: 11.2 G/DL — LOW (ref 11.5–15.5)
MAGNESIUM SERPL-MCNC: 1.8 MG/DL — SIGNIFICANT CHANGE UP (ref 1.6–2.6)
MCHC RBC-ENTMCNC: 29.6 PG — SIGNIFICANT CHANGE UP (ref 27–34)
MCHC RBC-ENTMCNC: 33.1 GM/DL — SIGNIFICANT CHANGE UP (ref 32–36)
MCV RBC AUTO: 89.4 FL — SIGNIFICANT CHANGE UP (ref 80–100)
NRBC # BLD: 0 /100 WBCS — SIGNIFICANT CHANGE UP (ref 0–0)
PHOSPHATE SERPL-MCNC: 2.9 MG/DL — SIGNIFICANT CHANGE UP (ref 2.5–4.5)
PLATELET # BLD AUTO: 331 K/UL — SIGNIFICANT CHANGE UP (ref 150–400)
POTASSIUM SERPL-MCNC: 3.7 MMOL/L — SIGNIFICANT CHANGE UP (ref 3.5–5.3)
POTASSIUM SERPL-SCNC: 3.7 MMOL/L — SIGNIFICANT CHANGE UP (ref 3.5–5.3)
PROT SERPL-MCNC: 7.3 G/DL — SIGNIFICANT CHANGE UP (ref 6–8.3)
RBC # BLD: 3.78 M/UL — LOW (ref 3.8–5.2)
RBC # FLD: 17.4 % — HIGH (ref 10.3–14.5)
SODIUM SERPL-SCNC: 141 MMOL/L — SIGNIFICANT CHANGE UP (ref 135–145)
WBC # BLD: 6.67 K/UL — SIGNIFICANT CHANGE UP (ref 3.8–10.5)
WBC # FLD AUTO: 6.67 K/UL — SIGNIFICANT CHANGE UP (ref 3.8–10.5)

## 2023-07-06 PROCEDURE — 99232 SBSQ HOSP IP/OBS MODERATE 35: CPT | Mod: GC

## 2023-07-06 RX ADMIN — CEFTRIAXONE 100 MILLIGRAM(S): 500 INJECTION, POWDER, FOR SOLUTION INTRAMUSCULAR; INTRAVENOUS at 15:03

## 2023-07-06 RX ADMIN — Medication 650 MILLIGRAM(S): at 16:05

## 2023-07-06 RX ADMIN — Medication 650 MILLIGRAM(S): at 15:04

## 2023-07-06 RX ADMIN — ATORVASTATIN CALCIUM 20 MILLIGRAM(S): 80 TABLET, FILM COATED ORAL at 22:02

## 2023-07-06 RX ADMIN — ALBUTEROL 2 PUFF(S): 90 AEROSOL, METERED ORAL at 09:48

## 2023-07-06 RX ADMIN — Medication 81 MILLIGRAM(S): at 12:31

## 2023-07-06 RX ADMIN — ALBUTEROL 2 PUFF(S): 90 AEROSOL, METERED ORAL at 03:51

## 2023-07-06 RX ADMIN — PANTOPRAZOLE SODIUM 40 MILLIGRAM(S): 20 TABLET, DELAYED RELEASE ORAL at 06:15

## 2023-07-06 RX ADMIN — ALBUTEROL 2 PUFF(S): 90 AEROSOL, METERED ORAL at 22:02

## 2023-07-06 RX ADMIN — ENOXAPARIN SODIUM 40 MILLIGRAM(S): 100 INJECTION SUBCUTANEOUS at 06:14

## 2023-07-06 RX ADMIN — AZITHROMYCIN 500 MILLIGRAM(S): 500 TABLET, FILM COATED ORAL at 12:31

## 2023-07-06 RX ADMIN — ALBUTEROL 2 PUFF(S): 90 AEROSOL, METERED ORAL at 15:03

## 2023-07-06 NOTE — DISCHARGE NOTE NURSING/CASE MANAGEMENT/SOCIAL WORK - NSDCPEFALRISK_GEN_ALL_CORE
For information on Fall & Injury Prevention, visit: [email or web address] OR  [email or web address] OR  https://www.cdc.gov/steadi/patient.html

## 2023-07-06 NOTE — CONSULT NOTE ADULT - TIME BILLING
- Review of records, telemetry, vital signs and daily labs.   - General and cardiovascular physical examination.  - Generation of cardiovascular treatment plan.  - Coordination of care.      Patient was seen and examined by me on 7/6/23,interim events noted,labs and radiology studies reviewed.  Anuj Wilson MD,FACC.  [address] [place], [place] [id number].  [phone]
- Review of records, telemetry, vital signs and daily labs.   - General and cardiovascular physical examination.  - Generation of cardiovascular treatment plan.  - Coordination of care.      Patient was seen and examined by me on 7/3/23,interim events noted,labs and radiology studies reviewed.  Anuj Wilson MD,FACC.  [address] [place], [place] [id number].  [phone]

## 2023-07-06 NOTE — PROGRESS NOTE ADULT - TIME BILLING
- Review of records, telemetry, vital signs and daily labs.   - General and cardiovascular physical examination.  - Generation of cardiovascular treatment plan.  - Coordination of care.      Patient was seen and examined by me on 7/6/23interim events noted,labs and radiology studies reviewed.  Anuj Wilson MD,FACC.  [address] [place], [place] [id number].  [phone]
- Review of records, telemetry, vital signs and daily labs.   - General and cardiovascular physical examination.  - Generation of cardiovascular treatment plan.  - Coordination of care.      Patient was seen and examined by me on 7/4/23,interim events noted,labs and radiology studies reviewed.  Anuj Wilson MD,FACC.  [address] [place], [place] [id number].  [phone]
- Review of records, telemetry, vital signs and daily labs.   - General and cardiovascular physical examination.  - Generation of cardiovascular treatment plan.  - Coordination of care.      Patient was seen and examined by me on 7/5/23interim events noted,labs and radiology studies reviewed.  Anuj Wilson MD,FACC.  [address] [place], [place] [id number].  [phone]

## 2023-07-06 NOTE — CONSULT NOTE ADULT - ASSESSMENT
A 76 year old female, from home, ambulating w/ a walker,  w/ PMHx of Anemia, Asthma, HTN, ITP, Muscular dystrophy, Polymyositis (prev on prednisone), and PE (prev on Eliquis), presented to the ED due to chest pain and dyspnea that started over 3 weeks ago, but has become more frequent recently.
ITP, Muscular dystrophy, Polymyositis (prev on prednisone), and PE (prev on Eliquis), presented to the ED due to chest pain and dyspnea that started over 3 weeks ago, but has become more frequent recently. BP of 142/81 mmHg, HR of 74, RR 19, Temp of 98.3 F, saturating at 100% on NRBM 15L. Now off NRBM saturating at 100% on RA. EKG showed NSR with Troponin of 22.6. CTA chest was negative for PE, but showed Cardiomegaly and low lung volumes with bibasilar atelectasis. However, chronic fracture of the upper sternal body noted. Negative for Covid-19 and Influenza. Admitted to telemetry for Unstable angina/ACS workup.      # Chest pain.   ·  Plan: Came with chest pain   Patient with risk factors for ACS  Patient with severe MR on previous Echo. Arrhythmia such as AFib to be included in the DDx   Currently asymptomatic   s/p  mg x1 in the ED  Vitals q4hr  on aspirin and statin   DASH/TLC diet   LDL 31  HA1c 5.7  CT angio no findings  ECHO severe mitral regurg, EF 50-55%  Off tele.    #  HTN (hypertension).   ·  Plan: on Metoprolol 12.5 mg PO BID with parameters  BP target <130/90 mmHg  DASH/TLC diet  Adjust medications as needed to meet target.

## 2023-07-06 NOTE — PROGRESS NOTE ADULT - SUBJECTIVE AND OBJECTIVE BOX
PGY-1 Progress Note discussed with attending    CHIEF COMPLAINT & BRIEF HOSPITAL COURSE:      INTERVAL HPI/OVERNIGHT EVENTS:       REVIEW OF SYSTEMS:  CONSTITUTIONAL: No fever, weight loss, or fatigue  RESPIRATORY: No cough, wheezing, chills or hemoptysis; No shortness of breath  CARDIOVASCULAR: No chest pain, palpitations, dizziness, or leg swelling  GASTROINTESTINAL: No abdominal pain. No nausea, vomiting, or hematemesis; No diarrhea or constipation. No melena or hematochezia.  GENITOURINARY: No dysuria or hematuria, urinary frequency  NEUROLOGICAL: No headaches, memory loss, loss of strength, numbness, or tremors  SKIN: No itching, burning, rashes, or lesions     MEDICATIONS  (STANDING):  albuterol    90 MICROgram(s) HFA Inhaler 2 Puff(s) Inhalation every 6 hours  aspirin  chewable 81 milliGRAM(s) Oral daily  atorvastatin 20 milliGRAM(s) Oral at bedtime  cefTRIAXone   IVPB 1000 milliGRAM(s) IV Intermittent every 24 hours  cefTRIAXone   IVPB      enoxaparin Injectable 40 milliGRAM(s) SubCutaneous every 24 hours  pantoprazole    Tablet 40 milliGRAM(s) Oral before breakfast    MEDICATIONS  (PRN):  acetaminophen     Tablet .. 650 milliGRAM(s) Oral every 6 hours PRN Temp greater or equal to 38C (100.4F), Mild Pain (1 - 3)      Vital Signs Last 24 Hrs  T(C): 37 (06 Jul 2023 11:31), Max: 37.3 (05 Jul 2023 20:29)  T(F): 98.6 (06 Jul 2023 11:31), Max: 99.1 (05 Jul 2023 20:29)  HR: 102 (06 Jul 2023 11:31) (86 - 102)  BP: 96/66 (06 Jul 2023 11:31) (96/66 - 169/96)  BP(mean): --  RR: 19 (06 Jul 2023 11:31) (18 - 20)  SpO2: 99% (06 Jul 2023 11:31) (95% - 100%)    Parameters below as of 06 Jul 2023 11:31  Patient On (Oxygen Delivery Method): room air        PHYSICAL EXAMINATION:  GENERAL: NAD, well built  HEAD:  Atraumatic, Normocephalic  EYES:  conjunctiva and sclera clear  NECK: Supple, No JVD, Normal thyroid  CHEST/LUNG: Clear to auscultation. Clear to percussion bilaterally; No rales, rhonchi, wheezing, or rubs  HEART: Regular rate and rhythm; No murmurs, rubs, or gallops  ABDOMEN: Soft, Nontender, Nondistended; Bowel sounds present, no pain or masses on palpation  NERVOUS SYSTEM:  Alert & Oriented X3  : voiding well  EXTREMITIES:  2+ Peripheral Pulses, No clubbing, cyanosis, or edema  SKIN: warm dry                          11.2   6.67  )-----------( 331      ( 06 Jul 2023 06:30 )             33.8     07-06    141  |  112<H>  |  10  ----------------------------<  100<H>  3.7   |  23  |  0.42<L>    Ca    8.4      06 Jul 2023 06:30  Phos  2.9     07-06  Mg     1.8     07-06    TPro  7.3  /  Alb  2.5<L>  /  TBili  0.3  /  DBili  x   /  AST  29  /  ALT  30  /  AlkPhos  74  07-06    LIVER FUNCTIONS - ( 06 Jul 2023 06:30 )  Alb: 2.5 g/dL / Pro: 7.3 g/dL / ALK PHOS: 74 U/L / ALT: 30 U/L DA / AST: 29 U/L / GGT: x                   I&O's Summary    05 Jul 2023 07:01  -  06 Jul 2023 07:00  --------------------------------------------------------  IN: 295 mL / OUT: 2000 mL / NET: -1705 mL          Culture - Blood (collected 04 Jul 2023 13:10)  Source: .Blood Blood-Peripheral  Preliminary Report (05 Jul 2023 20:01):    No growth at 24 hours    Culture - Blood (collected 04 Jul 2023 13:00)  Source: .Blood Blood-Peripheral  Preliminary Report (05 Jul 2023 20:02):    No growth at 24 hours    Culture - Sputum (collected 03 Jul 2023 21:50)  Source: .Sputum Sputum  Gram Stain (04 Jul 2023 06:53):    Few polymorphonuclear leukocytes per low power field    Rare Squamous epithelial cells per low power field    Numerous Gram positive cocci in pairs per oil power field    Moderate Gram Negative Rods per oil power field    Few Yeast like cells per oil powerfield  Final Report (05 Jul 2023 19:18):    Normal Respiratory Arleth present        CAPILLARY BLOOD GLUCOSE      RADIOLOGY & ADDITIONAL TESTS:                   CHIEF COMPLAINT & BRIEF HOSPITAL COURSE:  76F with PMHx of anemia, asthma HTN, ITP. Presented to the ED due to chest pain and dyspnea. Patient was admitted for ACS workout which was negative. Pt currently being treated for CAP.     INTERVAL HPI/OVERNIGHT EVENTS:   Patient complains of persistent cough and sleepiness. Denies chest pain, SOB, headache or neck pain. Patient is not oriented to time or place but is more receptive during history taking compared to yesterday.     REVIEW OF SYSTEMS:  CONSTITUTIONAL: No fever, weight loss, or fatigue  RESPIRATORY: Reports cough, denies chills or hemoptysis; No shortness of breath  CARDIOVASCULAR: No chest pain, palpitations, dizziness, or leg swelling  GASTROINTESTINAL: No abdominal pain. No nausea, vomiting, or hematemesis; No diarrhea or constipation. No melena or hematochezia.  GENITOURINARY: No dysuria or hematuria, urinary frequency  NEUROLOGICAL: No headaches, memory loss, numbness, or tremors       MEDICATIONS  (STANDING):  albuterol    90 MICROgram(s) HFA Inhaler 2 Puff(s) Inhalation every 6 hours  aspirin  chewable 81 milliGRAM(s) Oral daily  atorvastatin 20 milliGRAM(s) Oral at bedtime  cefTRIAXone   IVPB 1000 milliGRAM(s) IV Intermittent every 24 hours  cefTRIAXone   IVPB      enoxaparin Injectable 40 milliGRAM(s) SubCutaneous every 24 hours  pantoprazole    Tablet 40 milliGRAM(s) Oral before breakfast    MEDICATIONS  (PRN):  acetaminophen     Tablet .. 650 milliGRAM(s) Oral every 6 hours PRN Temp greater or equal to 38C (100.4F), Mild Pain (1 - 3)      Vital Signs Last 24 Hrs  T(C): 37 (06 Jul 2023 11:31), Max: 37.3 (05 Jul 2023 20:29)  T(F): 98.6 (06 Jul 2023 11:31), Max: 99.1 (05 Jul 2023 20:29)  HR: 102 (06 Jul 2023 11:31) (86 - 102)  BP: 96/66 (06 Jul 2023 11:31) (96/66 - 169/96)  BP(mean): --  RR: 19 (06 Jul 2023 11:31) (18 - 20)  SpO2: 99% (06 Jul 2023 11:31) (95% - 100%)    Parameters below as of 06 Jul 2023 11:31  Patient On (Oxygen Delivery Method): room air        PHYSICAL EXAMINATION:  GENERAL: Patient appears uncomfortable and tired  NECK: Supple  CHEST/LUNG: Diffuse bilateral rhonchi   HEART: Regular rate and rhythm; No murmurs, rubs, or gallops  ABDOMEN: Soft, Nontender, Nondistended; Bowel sounds present  NERVOUS SYSTEM:  Alert & Oriented X1,    EXTREMITIES:  2+ Peripheral Pulses 1+ pitting edema  SKIN: warm dry                          11.2   6.67  )-----------( 331      ( 06 Jul 2023 06:30 )             33.8     07-06    141  |  112<H>  |  10  ----------------------------<  100<H>  3.7   |  23  |  0.42<L>    Ca    8.4      06 Jul 2023 06:30  Phos  2.9     07-06  Mg     1.8     07-06    TPro  7.3  /  Alb  2.5<L>  /  TBili  0.3  /  DBili  x   /  AST  29  /  ALT  30  /  AlkPhos  74  07-06    LIVER FUNCTIONS - ( 06 Jul 2023 06:30 )  Alb: 2.5 g/dL / Pro: 7.3 g/dL / ALK PHOS: 74 U/L / ALT: 30 U/L DA / AST: 29 U/L / GGT: x                   I&O's Summary    05 Jul 2023 07:01  -  06 Jul 2023 07:00  --------------------------------------------------------  IN: 295 mL / OUT: 2000 mL / NET: -1705 mL          Culture - Blood (collected 04 Jul 2023 13:10)  Source: .Blood Blood-Peripheral  Preliminary Report (05 Jul 2023 20:01):    No growth at 24 hours    Culture - Blood (collected 04 Jul 2023 13:00)  Source: .Blood Blood-Peripheral  Preliminary Report (05 Jul 2023 20:02):    No growth at 24 hours    Culture - Sputum (collected 03 Jul 2023 21:50)  Source: .Sputum Sputum  Gram Stain (04 Jul 2023 06:53):    Few polymorphonuclear leukocytes per low power field    Rare Squamous epithelial cells per low power field    Numerous Gram positive cocci in pairs per oil power field    Moderate Gram Negative Rods per oil power field    Few Yeast like cells per oil powerfield  Final Report (05 Jul 2023 19:18):    Normal Respiratory Arleth present        CAPILLARY BLOOD GLUCOSE      RADIOLOGY & ADDITIONAL TESTS:

## 2023-07-06 NOTE — PROGRESS NOTE ADULT - PROBLEM SELECTOR PLAN 2
-Pt found to be confused 7/4   -Could be metabolic encephalopathy in the setting of pneumonia but as per son pt does get confused time to time at home as well   -UA neg   -Bcx pending result  -CTH neg for infarct or bleed -Pt found to be confused 7/4, as per son pt does get confused time to time at home as well   -UA neg   -Bcx pending resultBlood cultures showed no growth (7/4)  CTH neg for infarct or bleed -Pt found to be confused 7/4, as per son pt does get confused time to time at home as well   -UA neg   -CTH neg for infarct or bleed  -Blood cultures showed no growth (7/4) -Pt found to be confused 7/4, as per son pt does get confused time to time at home as well   -UA neg   -CTH neg for infarct or bleed  -Blood cultures showed no growth (7/4)  -Palliative consulted

## 2023-07-06 NOTE — PROGRESS NOTE ADULT - SUBJECTIVE AND OBJECTIVE BOX
PATIENT SEEN AND EXAMINED ON :- 7/6/23  DATE OF SERVICE:    7/6/23         Interim events noted,Labs ,Radiological studies and Cardiology tests reviewed .    MR#[id number]  PATIENT NAME:-[facility] COURSE: HPI:  A 76 year old female, from home, ambulating w/ a walker,  w/ PMHx of Anemia, Asthma, HTN, ITP, Muscular dystrophy, Polymyositis (prev on prednisone), and PE (prev on Eliquis), presented to the ED due to chest pain and dyspnea that started over 3 weeks ago, but has become more frequent recently. Pain is sternal located, dull in quality, 7/10 in intensity, non radiating, mostly on exertion, but 2 episodes while at rest, lasting between 10 and 20 minutes. This is associated with dyspnea and palpations. Denies fever, chills, night sweats, heartburn, early satiety, headache, or dizziness. On further questioning, patient mentioned requiring 2 pillows while sleeping due to dyspnea. Denies bendopnea. She also mentioned having some discomfort while palpating her chest. No recent travel or sick contact. She does not have any other concerns.   (03 Jul 2023 04:15)      INTERIM EVENTS:Patient seen at bedside ,interim events noted.      PMH -reviewed admission note, no change since admission  HEART FAILURE: Acute[ ]Chronic[ ] Systolic[ ] Diastolic[ ] Combined Systolic and Diastolic[ ]  CAD[ ] CABG[ ] PCI[ ]  DEVICES[ ] PPM[ ] ICD[ ] ILR[ ]  ATRIAL FIBRILLATION[ ] Paroxysmal[ ] Permanent[ ] CHADS2-[  ]  JORY[ ] CKD1[ ] CKD2[ ] CKD3[ ] CKD4[ ] ESRD[ ]  COPD[ ] HTN[ ]   DM[ ] Type1[ ] Type 2[ ]   CVA[ ] Paresis[ ]    AMBULATION: Assisted[ ] Cane/walker[ ] Independent[ ]    MEDICATIONS  (STANDING):  albuterol    90 MICROgram(s) HFA Inhaler 2 Puff(s) Inhalation every 6 hours  aspirin  chewable 81 milliGRAM(s) Oral daily  atorvastatin 20 milliGRAM(s) Oral at bedtime  cefTRIAXone   IVPB      cefTRIAXone   IVPB 1000 milliGRAM(s) IV Intermittent every 24 hours  enoxaparin Injectable 40 milliGRAM(s) SubCutaneous every 24 hours  pantoprazole    Tablet 40 milliGRAM(s) Oral before breakfast    MEDICATIONS  (PRN):  acetaminophen     Tablet .. 650 milliGRAM(s) Oral every 6 hours PRN Temp greater or equal to 38C (100.4F), Mild Pain (1 - 3)            REVIEW OF SYSTEMS:  Constitutional: [ ] fever, [ ]weight loss,  [ ]fatigue [ ]weight gain  Eyes: [ ] visual changes  Respiratory: [ ]shortness of breath;  [ ] cough, [ ]wheezing, [ ]chills, [ ]hemoptysis  Cardiovascular: [ ] chest pain, [ ]palpitations, [ ]dizziness,  [ ]leg swelling[ ]orthopnea[ ]PND  Gastrointestinal: [ ] abdominal pain, [ ]nausea, [ ]vomiting,  [ ]diarrhea [ ]Constipation [ ]Melena  Genitourinary: [ ] dysuria, [ ] hematuria [ ][place]  Neurologic: [ ] headaches [ ] tremors[ ]weakness [ ]Paralysis Right[ ] Left[ ]  Skin: [ ] itching, [ ]burning, [ ] rashes  Endocrine: [ ] heat or cold intolerance  Musculoskeletal: [ ] joint pain or swelling; [ ] muscle, back, or extremity pain  Psychiatric: [ ] depression, [ ]anxiety, [ ]mood swings, or [ ]difficulty sleeping  Hematologic: [ ] easy bruising, [ ] bleeding gums    [ ] All remaining systems negative except as per above.   [ ]Unable to obtain.  [x] No change in ROS since admission      Vital Signs Last 24 Hrs  T(C): 36.9 (06 Jul 2023 20:01), Max: 37.2 (06 Jul 2023 07:31)  T(F): 98.4 (06 Jul 2023 20:01), Max: 99 (06 Jul 2023 07:31)  HR: 98 (06 Jul 2023 20:01) (86 - 102)  BP: 124/82 (06 Jul 2023 20:01) (96/66 - 147/95)  BP(mean): --  RR: 20 (06 Jul 2023 20:01) (18 - 20)  SpO2: 96% (06 Jul 2023 20:01) (95% - 99%)    Parameters below as of 06 Jul 2023 20:01  Patient On (Oxygen Delivery Method): room air      I&O's Summary    05 Jul 2023 07:01  -  06 Jul 2023 07:00  --------------------------------------------------------  IN: 295 mL / OUT: 2000 mL / NET: -1705 mL    06 Jul 2023 07:01  -  06 Jul 2023 21:28  --------------------------------------------------------  IN: 275 mL / OUT: 1150 mL / NET: -875 mL        PHYSICAL EXAM:  General: No acute distress BMI-  HEENT: EOMI, PERRL  Neck: Supple, [ ] JVD  Lungs: Equal air entry bilaterally; [ ] rales [ ] wheezing [ ] rhonchi  Heart: Regular rate and rhythm; [x ] murmur   2/6 [ x] systolic [ ] diastolic [ ] radiation[ ] rubs [ ]  gallops  Abdomen: Nontender, bowel sounds present  Extremities: No clubbing, cyanosis, [ ] edema [ ]Pulses  equal and intact  Nervous system:  Alert & Oriented X3, no focal deficits  Psychiatric: Normal affect  Skin: No rashes or lesions    LABS:  07-06    141  |  112<H>  |  10  ----------------------------<  100<H>  3.7   |  23  |  0.42<L>    Ca    8.4      06 Jul 2023 06:30  Phos  2.9     07-06  Mg     1.8     07-06    TPro  7.3  /  Alb  2.5<L>  /  TBili  0.3  /  DBili  x   /  AST  29  /  ALT  30  /  AlkPhos  74  07-06    Creatinine Trend: 0.42<--, 0.47<--, 0.42<--, 0.59<--, 0.50<--                        11.2   6.67  )-----------( 331      ( 06 Jul 2023 06:30 )             33.8           Patient name: DERRICK QUIGLEY  YOB: 1945   Age: 77 (F)   MR#: [id number]  Study Date: 7/3/2023  Location: [address] [place], [place] [address]Sonographer: Farhat Guzmán [facility]  Study quality: Technically good  Referring Physician:  JERZY PRADHAN MD  Blood Pressure: 107/77 mmHg  Height: 154 cm  Weight: 40 kg  BSA: 1.3 m2  ------------------------------------------------------------------------    PROCEDURE: Transthoracic echocardiogram with 2-D, M-Mode  and complete spectral and color flow Doppler.  INDICATION: Chest pain, unspecified (R07.9)  HISTORY:  ------------------------------------------------------------------------  DIMENSIONS:  Dimensions:     Normal Values:  LA:     2.8 cm    2.0 - 4.0 cm  Ao:     3.4 cm    2.0 - 3.8 cm  SEPTUM: 1.0 cm    0.6 - 1.2 cm  PWT:    1.0 cm    0.6 - 1.1 cm  LVIDd:  3.6 cm    3.0 - 5.6 cm  LVIDs:  2.5 cm    1.8 - 4.0 cm      Derived Variables:  LVMI: 81 g/m2  RWT: 0.55  Ejection Fraction Visual Estimate: 50-55 %    ------------------------------------------------------------------------  OBSERVATIONS:  Mitral Valve: Normal mitral valve. Mild posterior mitral  annular calcification. Moderate mitral regurgitation.  Severity of mitral regurgitation may be underestimated due  to Coanda effect.  Aortic Root: Aortic Root: 3.4 cm. Ascending aorta not well  seen.  Aortic Valve: Trileaflet aortic valve. No aortic stenosis.  No aortic valve regurgitation seen.  Left Atrium: Normal left atrium.  LA volume index = 21  cc/m2.  Left Ventricle: Low-normal Left Ventricular Systolic  Function,  (EF = 50-55% by visual estimation) No regional  wall motion abnormalities. Borderline increased left  ventricular wall thickness.  The diastolic function is  indeterminate based on current diagnostic criteria.  Right Heart: Normal right atrium. Right ventricle not well  visualized. Normal tricuspid valve. Mild tricuspid  regurgitation. Normal pulmonic valve. Trace pulmonic  insufficiency is noted.  Pericardium/PleuraNormal pericardium with no pericardial  effusion.  Hemodynamic: RAPressure is 8 mm Hg. RV systolic pressure  is normal at  31 mm Hg.  ------------------------------------------------------------------------  CONCLUSIONS:  1. Normal mitral valve. Mild posterior mitral annular  calcification. Moderate mitral regurgitation.  Severity of  mitral regurgitation may be underestimated due to Coanda  effect.  2. Trileaflet aortic valve. No aortic stenosis. No aortic  valve regurgitation seen.  3. Borderline increased left ventricular wall thickness.  4. Low-normal Left Ventricular Systolic Function,  (EF =  50-55% by visual estimation) No regional wall motion  abnormalities.  5. The diastolic function is indeterminate based on current  diagnostic criteria.  6. Right ventricle not well visualized.    *** Compared with [facility] report of 8/5/2022, current study is  a TTE - severity of mitral regurgitation may be  underestimated due to eccentric jet; EF is low-normal on  current study.  ------------------------------------------------------------------------  Confirmed on  7/4/2023 - 09:25:29 by Pretty Neumann MD  ------------------------------------------------------------------------

## 2023-07-06 NOTE — PROGRESS NOTE ADULT - PROBLEM SELECTOR PLAN 3
Came with chest pain   Patient with risk factors for ACS  Patient with severe MR on previous Echo. Arrhythmia such as AFib to be included in the DDx   Currently asymptomatic   Telemetry   s/p  mg x1 in the ED  Vitals q4hr  on aspirin and statin   DASH/TLC diet   LDL 31  HA1c 5.7  Cardio Dr Wilson consulted   CT angio no findings  ECHO severe mitral regurg, EF 50-55% Came with chest pain   Patient with risk factors for ACS  Patient with severe MR on previous Echo. Arrhythmia such as AFib to be included in the DDx   Currently asymptomatic   s/p  mg x1 in the ED  Vitals q4hr  on aspirin and statin   DASH/TLC diet   LDL 31  HA1c 5.7  Cardio Dr Wilson consulted   CT angio no findings  ECHO severe mitral regurg, EF 50-55%  Off tele

## 2023-07-06 NOTE — PROGRESS NOTE ADULT - PROBLEM SELECTOR PLAN 4
on Metoprolol 12.5 mg PO BID with parameters  BP target <130/90 mmHg  DASH/TLC diet  Adjust medications as needed to meet target. dc metoprolol started on admission , no concern for CAD   BP target <130/90 mmHg  DASH/TLC diet  on lisinopril at home , held due to normal BP

## 2023-07-06 NOTE — PROGRESS NOTE ADULT - PROBLEM SELECTOR PLAN 1
- CXR showed small pleural effusion on left with atelectasis /pneumonia   Blood cultures pending result  - on abx cef and azithromycin - CXR showed small pleural effusion on left with atelectasis /pneumonia   Blood cultures showed no growth (7/4)  - on abx cef and azithromycin   WBC 7/6: 6.67, trending downwards 10.61>7.80>6.67 - CXR showed small pleural effusion on left with atelectasis /pneumonia   Blood cultures showed no growth (7/4)  - on abx cef and azithromycin   WBC 7/6: 6.67, trending downwards 10.61>7.80>6.67  Downgraded to 4th floor - CXR showed small pleural effusion on left with atelectasis /pneumonia   Blood cultures showed no growth (7/4)  - on abx cef , finish course of azithromycin   -WBC 7/6: 6.67, trending downwards 10.61>7.80>6.67

## 2023-07-06 NOTE — DISCHARGE NOTE NURSING/CASE MANAGEMENT/SOCIAL WORK - PATIENT PORTAL LINK FT
You can access the FollowMyHealth Patient Portal offered by [facility] by registering at the following website: [email or web address]. By joining [facility]’s FollowMyHealth portal, you will also be able to view your health information using other applications (apps) compatible with our system.

## 2023-07-06 NOTE — CONSULT NOTE ADULT - SUBJECTIVE AND OBJECTIVE BOX
PATIENT SEEN AND EXAMINED ON :- 7/6/23  DATE OF SERVICE: 7/6/23            Interim events noted,Labs ,Radiological studies and Cardiology tests reviewed .    MR#[id number]  PATIENT NAME:-[facility] COURSE: HPI:  A 76 year old female, from home, ambulating w/ a walker,  w/ PMHx of Anemia, Asthma, HTN, ITP, Muscular dystrophy, Polymyositis (prev on prednisone), and PE (prev on Eliquis), presented to the ED due to chest pain and dyspnea that started over 3 weeks ago, but has become more frequent recently. Pain is sternal located, dull in quality, 7/10 in intensity, non radiating, mostly on exertion, but 2 episodes while at rest, lasting between 10 and 20 minutes. This is associated with dyspnea and palpations. Denies fever, chills, night sweats, heartburn, early satiety, headache, or dizziness. On further questioning, patient mentioned requiring 2 pillows while sleeping due to dyspnea. Denies bendopnea. She also mentioned having some discomfort while palpating her chest. No recent travel or sick contact. She does not have any other concerns.   (03 Jul 2023 04:15)      INTERIM EVENTS:Patient seen at bedside ,interim events noted.      PMH -reviewed admission note, no change since admission  HEART FAILURE: Acute[ ]Chronic[ ] Systolic[ ] Diastolic[ ] Combined Systolic and Diastolic[ ]  CAD[ ] CABG[ ] PCI[ ]  DEVICES[ ] PPM[ ] ICD[ ] ILR[ ]  ATRIAL FIBRILLATION[ ] Paroxysmal[ ] Permanent[ ] CHADS2-[  ]  JORY[ ] CKD1[ ] CKD2[ ] CKD3[ ] CKD4[ ] ESRD[ ]  COPD[ ] HTN[ ]   DM[ ] Type1[ ] Type 2[ ]   CVA[ ] Paresis[ ]    AMBULATION: Assisted[ ] Cane/walker[ ] Independent[ ]    MEDICATIONS  (STANDING):  albuterol    90 MICROgram(s) HFA Inhaler 2 Puff(s) Inhalation every 6 hours  aspirin  chewable 81 milliGRAM(s) Oral daily  atorvastatin 20 milliGRAM(s) Oral at bedtime  cefTRIAXone   IVPB 1000 milliGRAM(s) IV Intermittent every 24 hours  cefTRIAXone   IVPB      enoxaparin Injectable 40 milliGRAM(s) SubCutaneous every 24 hours  pantoprazole    Tablet 40 milliGRAM(s) Oral before breakfast    MEDICATIONS  (PRN):  acetaminophen     Tablet .. 650 milliGRAM(s) Oral every 6 hours PRN Temp greater or equal to 38C (100.4F), Mild Pain (1 - 3)            REVIEW OF SYSTEMS:  Constitutional: [ ] fever, [ ]weight loss,  [ ]fatigue [ ]weight gain  Eyes: [ ] visual changes  Respiratory: [ ]shortness of breath;  [ ] cough, [ ]wheezing, [ ]chills, [ ]hemoptysis  Cardiovascular: [ ] chest pain, [ ]palpitations, [ ]dizziness,  [ ]leg swelling[ ]orthopnea[ ]PND  Gastrointestinal: [ ] abdominal pain, [ ]nausea, [ ]vomiting,  [ ]diarrhea [ ]Constipation [ ]Melena  Genitourinary: [ ] dysuria, [ ] hematuria [ ]Calvert  Neurologic: [ ] headaches [ ] tremors[ ]weakness [ ]Paralysis Right[ ] Left[ ]  Skin: [ ] itching, [ ]burning, [ ] rashes  Endocrine: [ ] heat or cold intolerance  Musculoskeletal: [ ] joint pain or swelling; [ ] muscle, back, or extremity pain  Psychiatric: [ ] depression, [ ]anxiety, [ ]mood swings, or [ ]difficulty sleeping  Hematologic: [ ] easy bruising, [ ] bleeding gums    [ ] All remaining systems negative except as per above.   [ ]Unable to obtain.  [x] No change in ROS since admission      Vital Signs Last 24 Hrs  T(C): 36.9 (06 Jul 2023 20:01), Max: 37.2 (06 Jul 2023 07:31)  T(F): 98.4 (06 Jul 2023 20:01), Max: 99 (06 Jul 2023 07:31)  HR: 98 (06 Jul 2023 20:01) (86 - 102)  BP: 124/82 (06 Jul 2023 20:01) (96/66 - 147/95)  BP(mean): --  RR: 20 (06 Jul 2023 20:01) (18 - 20)  SpO2: 96% (06 Jul 2023 20:01) (95% - 99%)    Parameters below as of 06 Jul 2023 20:01  Patient On (Oxygen Delivery Method): room air      I&O's Summary    05 Jul 2023 07:01  -  06 Jul 2023 07:00  --------------------------------------------------------  IN: 295 mL / OUT: 2000 mL / NET: -1705 mL    06 Jul 2023 07:01  -  06 Jul 2023 21:24  --------------------------------------------------------  IN: 275 mL / OUT: 1150 mL / NET: -875 mL        PHYSICAL EXAM:  General: No acute distress BMI-  HEENT: EOMI, PERRL  Neck: Supple, [ ] JVD  Lungs: Equal air entry bilaterally; [ ] rales [ ] wheezing [ ] rhonchi  Heart: Regular rate and rhythm; [x ] murmur   2/6 [ x] systolic [ ] diastolic [ ] radiation[ ] rubs [ ]  gallops  Abdomen: Nontender, bowel sounds present  Extremities: No clubbing, cyanosis, [ ] edema [ ]Pulses  equal and intact  Nervous system:  Alert & Oriented X3, no focal deficits  Psychiatric: Normal affect  Skin: No rashes or lesions    LABS:  07-06    141  |  112<H>  |  10  ----------------------------<  100<H>  3.7   |  23  |  0.42<L>    Ca    8.4      06 Jul 2023 06:30  Phos  2.9     07-06  Mg     1.8     07-06    TPro  7.3  /  Alb  2.5<L>  /  TBili  0.3  /  DBili  x   /  AST  29  /  ALT  30  /  AlkPhos  74  07-06    Creatinine Trend: 0.42<--, 0.47<--, 0.42<--, 0.59<--, 0.50<--                        11.2   6.67  )-----------( 331      ( 06 Jul 2023 06:30 )             33.8

## 2023-07-07 VITALS
OXYGEN SATURATION: 97 % | RESPIRATION RATE: 18 BRPM | HEART RATE: 90 BPM | SYSTOLIC BLOOD PRESSURE: 171 MMHG | TEMPERATURE: 99 F | DIASTOLIC BLOOD PRESSURE: 96 MMHG

## 2023-07-07 LAB
ALBUMIN SERPL ELPH-MCNC: 2.4 G/DL — LOW (ref 3.5–5)
ALP SERPL-CCNC: 70 U/L — SIGNIFICANT CHANGE UP (ref 40–120)
ALT FLD-CCNC: 28 U/L DA — SIGNIFICANT CHANGE UP (ref 10–60)
ANION GAP SERPL CALC-SCNC: 8 MMOL/L — SIGNIFICANT CHANGE UP (ref 5–17)
AST SERPL-CCNC: 27 U/L — SIGNIFICANT CHANGE UP (ref 10–40)
BILIRUB SERPL-MCNC: 0.3 MG/DL — SIGNIFICANT CHANGE UP (ref 0.2–1.2)
BUN SERPL-MCNC: 13 MG/DL — SIGNIFICANT CHANGE UP (ref 7–18)
CALCIUM SERPL-MCNC: 8.5 MG/DL — SIGNIFICANT CHANGE UP (ref 8.4–10.5)
CHLORIDE SERPL-SCNC: 111 MMOL/L — HIGH (ref 96–108)
CO2 SERPL-SCNC: 23 MMOL/L — SIGNIFICANT CHANGE UP (ref 22–31)
CREAT SERPL-MCNC: 0.42 MG/DL — LOW (ref 0.5–1.3)
EGFR: 102 ML/MIN/1.73M2 — SIGNIFICANT CHANGE UP
GLUCOSE SERPL-MCNC: 89 MG/DL — SIGNIFICANT CHANGE UP (ref 70–99)
HCT VFR BLD CALC: 32.6 % — LOW (ref 34.5–45)
HGB BLD-MCNC: 10.8 G/DL — LOW (ref 11.5–15.5)
MAGNESIUM SERPL-MCNC: 1.8 MG/DL — SIGNIFICANT CHANGE UP (ref 1.6–2.6)
MCHC RBC-ENTMCNC: 29.2 PG — SIGNIFICANT CHANGE UP (ref 27–34)
MCHC RBC-ENTMCNC: 33.1 GM/DL — SIGNIFICANT CHANGE UP (ref 32–36)
MCV RBC AUTO: 88.1 FL — SIGNIFICANT CHANGE UP (ref 80–100)
NRBC # BLD: 0 /100 WBCS — SIGNIFICANT CHANGE UP (ref 0–0)
PHOSPHATE SERPL-MCNC: 3.6 MG/DL — SIGNIFICANT CHANGE UP (ref 2.5–4.5)
PLATELET # BLD AUTO: 324 K/UL — SIGNIFICANT CHANGE UP (ref 150–400)
POTASSIUM SERPL-MCNC: 4.3 MMOL/L — SIGNIFICANT CHANGE UP (ref 3.5–5.3)
POTASSIUM SERPL-SCNC: 4.3 MMOL/L — SIGNIFICANT CHANGE UP (ref 3.5–5.3)
PROT SERPL-MCNC: 7 G/DL — SIGNIFICANT CHANGE UP (ref 6–8.3)
RBC # BLD: 3.7 M/UL — LOW (ref 3.8–5.2)
RBC # FLD: 17.3 % — HIGH (ref 10.3–14.5)
SODIUM SERPL-SCNC: 142 MMOL/L — SIGNIFICANT CHANGE UP (ref 135–145)
WBC # BLD: 6.85 K/UL — SIGNIFICANT CHANGE UP (ref 3.8–10.5)
WBC # FLD AUTO: 6.85 K/UL — SIGNIFICANT CHANGE UP (ref 3.8–10.5)

## 2023-07-07 PROCEDURE — 85025 COMPLETE CBC W/AUTO DIFF WBC: CPT

## 2023-07-07 PROCEDURE — 97162 PT EVAL MOD COMPLEX 30 MIN: CPT

## 2023-07-07 PROCEDURE — 70450 CT HEAD/BRAIN W/O DYE: CPT

## 2023-07-07 PROCEDURE — 87899 AGENT NOS ASSAY W/OPTIC: CPT

## 2023-07-07 PROCEDURE — 87637 SARSCOV2&INF A&B&RSV AMP PRB: CPT

## 2023-07-07 PROCEDURE — 83735 ASSAY OF MAGNESIUM: CPT

## 2023-07-07 PROCEDURE — 99285 EMERGENCY DEPT VISIT HI MDM: CPT

## 2023-07-07 PROCEDURE — 84484 ASSAY OF TROPONIN QUANT: CPT

## 2023-07-07 PROCEDURE — 83880 ASSAY OF NATRIURETIC PEPTIDE: CPT

## 2023-07-07 PROCEDURE — 93005 ELECTROCARDIOGRAM TRACING: CPT

## 2023-07-07 PROCEDURE — 94640 AIRWAY INHALATION TREATMENT: CPT

## 2023-07-07 PROCEDURE — 83036 HEMOGLOBIN GLYCOSYLATED A1C: CPT

## 2023-07-07 PROCEDURE — 99239 HOSP IP/OBS DSCHRG MGMT >30: CPT | Mod: GC

## 2023-07-07 PROCEDURE — 85027 COMPLETE CBC AUTOMATED: CPT

## 2023-07-07 PROCEDURE — 87040 BLOOD CULTURE FOR BACTERIA: CPT

## 2023-07-07 PROCEDURE — 81001 URINALYSIS AUTO W/SCOPE: CPT

## 2023-07-07 PROCEDURE — 36415 COLL VENOUS BLD VENIPUNCTURE: CPT

## 2023-07-07 PROCEDURE — 71275 CT ANGIOGRAPHY CHEST: CPT | Mod: MA

## 2023-07-07 PROCEDURE — 84100 ASSAY OF PHOSPHORUS: CPT

## 2023-07-07 PROCEDURE — 0225U NFCT DS DNA&RNA 21 SARSCOV2: CPT

## 2023-07-07 PROCEDURE — 80053 COMPREHEN METABOLIC PANEL: CPT

## 2023-07-07 PROCEDURE — 71045 X-RAY EXAM CHEST 1 VIEW: CPT

## 2023-07-07 PROCEDURE — 93306 TTE W/DOPPLER COMPLETE: CPT

## 2023-07-07 PROCEDURE — 80061 LIPID PANEL: CPT

## 2023-07-07 PROCEDURE — 87070 CULTURE OTHR SPECIMN AEROBIC: CPT

## 2023-07-07 RX ORDER — CEFUROXIME AXETIL 250 MG
1 TABLET ORAL
Qty: 2 | Refills: 0
Start: 2023-07-07

## 2023-07-07 RX ORDER — MEMANTINE HYDROCHLORIDE 10 MG/1
5 TABLET ORAL DAILY
Refills: 0 | Status: DISCONTINUED | OUTPATIENT
Start: 2023-07-07 | End: 2023-07-07

## 2023-07-07 RX ORDER — LISINOPRIL 2.5 MG/1
1 TABLET ORAL
Refills: 0 | DISCHARGE

## 2023-07-07 RX ADMIN — ALBUTEROL 2 PUFF(S): 90 AEROSOL, METERED ORAL at 03:40

## 2023-07-07 RX ADMIN — Medication 81 MILLIGRAM(S): at 11:45

## 2023-07-07 RX ADMIN — MEMANTINE HYDROCHLORIDE 5 MILLIGRAM(S): 10 TABLET ORAL at 11:45

## 2023-07-07 RX ADMIN — PANTOPRAZOLE SODIUM 40 MILLIGRAM(S): 20 TABLET, DELAYED RELEASE ORAL at 06:04

## 2023-07-07 RX ADMIN — ALBUTEROL 2 PUFF(S): 90 AEROSOL, METERED ORAL at 11:46

## 2023-07-07 RX ADMIN — CEFTRIAXONE 100 MILLIGRAM(S): 500 INJECTION, POWDER, FOR SOLUTION INTRAMUSCULAR; INTRAVENOUS at 12:24

## 2023-07-07 RX ADMIN — ENOXAPARIN SODIUM 40 MILLIGRAM(S): 100 INJECTION SUBCUTANEOUS at 05:42

## 2023-07-07 NOTE — PROGRESS NOTE ADULT - SUBJECTIVE AND OBJECTIVE BOX
CHIEF COMPLAINT & BRIEF HOSPITAL COURSE:  76F with PMHx of anemia, asthma HTN, ITP. Presented to the ED due to chest pain and dyspnea. Patient was admitted for ACS workout which was negative. Pt currently being treated for CAP.     INTERVAL HPI/OVERNIGHT EVENTS:   Patient reports feeling tired and persistent cough. She states her appetite is low because she does not like the hospital food. Denies chest pain, SOB, abdominal pain or headache.     REVIEW OF SYSTEMS:  CONSTITUTIONAL: No fever, weight loss, or fatigue  RESPIRATORY: Reports cough, denies wheezing, chills or hemoptysis; No shortness of breath  CARDIOVASCULAR: No chest pain, palpitations, dizziness, or leg swelling  GASTROINTESTINAL: No abdominal pain. No nausea, vomiting, or hematemesis; No diarrhea or constipation. No melena or hematochezia.  GENITOURINARY: No dysuria or hematuria, urinary frequency  NEUROLOGICAL: No headaches, memory loss, loss of strength, numbness, or tremors  SKIN: No itching, burning, rashes, or lesions     MEDICATIONS  (STANDING):  albuterol    90 MICROgram(s) HFA Inhaler 2 Puff(s) Inhalation every 6 hours  aspirin  chewable 81 milliGRAM(s) Oral daily  atorvastatin 20 milliGRAM(s) Oral at bedtime  cefTRIAXone   IVPB 1000 milliGRAM(s) IV Intermittent every 24 hours  cefTRIAXone   IVPB      enoxaparin Injectable 40 milliGRAM(s) SubCutaneous every 24 hours  memantine 5 milliGRAM(s) Oral daily  pantoprazole    Tablet 40 milliGRAM(s) Oral before breakfast    MEDICATIONS  (PRN):  acetaminophen     Tablet .. 650 milliGRAM(s) Oral every 6 hours PRN Temp greater or equal to 38C (100.4F), Mild Pain (1 - 3)      Vital Signs Last 24 Hrs  T(C): 37.4 (07 Jul 2023 11:25), Max: 37.4 (07 Jul 2023 11:25)  T(F): 99.3 (07 Jul 2023 11:25), Max: 99.3 (07 Jul 2023 11:25)  HR: 90 (07 Jul 2023 11:25) (88 - 102)  BP: 171/96 (07 Jul 2023 11:25) (112/72 - 171/96)  BP(mean): --  RR: 18 (07 Jul 2023 11:25) (18 - 20)  SpO2: 97% (07 Jul 2023 11:25) (96% - 100%)    Parameters below as of 07 Jul 2023 11:25  Patient On (Oxygen Delivery Method): room air        PHYSICAL EXAMINATION:  GENERAL: NAD  NECK: Supple, non tender to palpation  CHEST/LUNG: Mild diffuse bilateral rhonchi   HEART: Regular rate and rhythm; No murmurs, rubs, or gallops  ABDOMEN: Soft, Nontender, Nondistended; Bowel sounds present, no pain or masses on palpation  NERVOUS SYSTEM:  Alert & Oriented X2 (to person and place), she is more AAO compared to yesterday  : voiding well  EXTREMITIES:  2+ Peripheral Pulses, No clubbing, cyanosis, or edema  SKIN: warm dry                          10.8   6.85  )-----------( 324      ( 07 Jul 2023 07:06 )             32.6     07-07    142  |  111<H>  |  13  ----------------------------<  89  4.3   |  23  |  0.42<L>    Ca    8.5      07 Jul 2023 07:06  Phos  3.6     07-07  Mg     1.8     07-07    TPro  7.0  /  Alb  2.4<L>  /  TBili  0.3  /  DBili  x   /  AST  27  /  ALT  28  /  AlkPhos  70  07-07    LIVER FUNCTIONS - ( 07 Jul 2023 07:06 )  Alb: 2.4 g/dL / Pro: 7.0 g/dL / ALK PHOS: 70 U/L / ALT: 28 U/L DA / AST: 27 U/L / GGT: x                   I&O's Summary    06 Jul 2023 07:01  -  07 Jul 2023 07:00  --------------------------------------------------------  IN: 275 mL / OUT: 1850 mL / NET: -1575 mL            CAPILLARY BLOOD GLUCOSE      RADIOLOGY & ADDITIONAL TESTS:                   CHIEF COMPLAINT & BRIEF HOSPITAL COURSE:  76F with PMHx of anemia, asthma HTN, ITP. Presented to the ED due to chest pain and dyspnea. Patient was admitted for ACS workout which was negative. Pt currently being treated for CAP.     INTERVAL HPI/OVERNIGHT EVENTS:   Patient reports feeling tired and persistent cough. She states her appetite is low because she does not like the hospital food. Denies chest pain, SOB, abdominal pain or headache.     REVIEW OF SYSTEMS:  CONSTITUTIONAL: No fever, weight loss, or fatigue  RESPIRATORY: Reports cough, denies wheezing, chills or hemoptysis; No shortness of breath  CARDIOVASCULAR: No chest pain, palpitations, dizziness, or leg swelling  GASTROINTESTINAL: No abdominal pain. No nausea, vomiting, or hematemesis; No diarrhea or constipation. No melena or hematochezia.  GENITOURINARY: No dysuria or hematuria, urinary frequency  NEUROLOGICAL: No headaches, memory loss, loss of strength, numbness, or tremors  SKIN: No itching, burning, rashes, or lesions     MEDICATIONS  (STANDING):  albuterol    90 MICROgram(s) HFA Inhaler 2 Puff(s) Inhalation every 6 hours  aspirin  chewable 81 milliGRAM(s) Oral daily  atorvastatin 20 milliGRAM(s) Oral at bedtime  cefTRIAXone   IVPB 1000 milliGRAM(s) IV Intermittent every 24 hours  cefTRIAXone   IVPB      enoxaparin Injectable 40 milliGRAM(s) SubCutaneous every 24 hours  memantine 5 milliGRAM(s) Oral daily  pantoprazole    Tablet 40 milliGRAM(s) Oral before breakfast    MEDICATIONS  (PRN):  acetaminophen     Tablet .. 650 milliGRAM(s) Oral every 6 hours PRN Temp greater or equal to 38C (100.4F), Mild Pain (1 - 3)      Vital Signs Last 24 Hrs  T(C): 37.4 (07 Jul 2023 11:25), Max: 37.4 (07 Jul 2023 11:25)  T(F): 99.3 (07 Jul 2023 11:25), Max: 99.3 (07 Jul 2023 11:25)  HR: 90 (07 Jul 2023 11:25) (88 - 102)  BP: 171/96 (07 Jul 2023 11:25) (112/72 - 171/96)  BP(mean): --  RR: 18 (07 Jul 2023 11:25) (18 - 20)  SpO2: 97% (07 Jul 2023 11:25) (96% - 100%)    Parameters below as of 07 Jul 2023 11:25  Patient On (Oxygen Delivery Method): room air        PHYSICAL EXAMINATION:  GENERAL: NAD  NECK: Supple, non tender to palpation  CHEST/LUNG: Mild diffuse bilateral rhonchi   HEART: Regular rate and rhythm; 3/6 holosystolic murmur  ABDOMEN: Soft, Nontender, Nondistended; Bowel sounds present, no pain or masses on palpation  NERVOUS SYSTEM:  Alert & Oriented X2 (to person and place), she is more AAO compared to yesterday  : voiding well  EXTREMITIES:  2+ Peripheral Pulses, No clubbing, cyanosis, or edema  SKIN: warm dry                          10.8   6.85  )-----------( 324      ( 07 Jul 2023 07:06 )             32.6     07-07    142  |  111<H>  |  13  ----------------------------<  89  4.3   |  23  |  0.42<L>    Ca    8.5      07 Jul 2023 07:06  Phos  3.6     07-07  Mg     1.8     07-07    TPro  7.0  /  Alb  2.4<L>  /  TBili  0.3  /  DBili  x   /  AST  27  /  ALT  28  /  AlkPhos  70  07-07    LIVER FUNCTIONS - ( 07 Jul 2023 07:06 )  Alb: 2.4 g/dL / Pro: 7.0 g/dL / ALK PHOS: 70 U/L / ALT: 28 U/L DA / AST: 27 U/L / GGT: x                   I&O's Summary    06 Jul 2023 07:01  -  07 Jul 2023 07:00  --------------------------------------------------------  IN: 275 mL / OUT: 1850 mL / NET: -1575 mL            CAPILLARY BLOOD GLUCOSE      RADIOLOGY & ADDITIONAL TESTS:

## 2023-07-07 NOTE — PROGRESS NOTE ADULT - PROBLEM SELECTOR PLAN 3
Came with chest pain   Patient with risk factors for ACS  Patient with severe MR on previous Echo. Arrhythmia such as AFib to be included in the DDx   Currently asymptomatic   s/p  mg x1 in the ED  Vitals q4hr  on aspirin and statin   DASH/TLC diet   LDL 31  HA1c 5.7  Cardio Dr Wilson consulted   CT angio no findings  ECHO severe mitral regurg, EF 50-55%  Off tele

## 2023-07-07 NOTE — PROGRESS NOTE ADULT - PROBLEM SELECTOR PLAN 1
- CXR showed small pleural effusion on left with atelectasis /pneumonia   - Blood cultures showed no growth (7/4)  - On abx cef , finish course of azithromycin   - WBC 7/7: 6.85, trending downwards 10.61 (7/4) - CXR showed small pleural effusion on left with atelectasis /pneumonia   - Blood cultures showed no growth (7/4)  - On abx cef , finish course of azithromycin   - WBC 7/7: 6.85, trending downwards 10.61 (7/4)    Pt discharged 7/7

## 2023-07-07 NOTE — PROGRESS NOTE ADULT - PROVIDER SPECIALTY LIST ADULT
Cardiology
Internal Medicine
Cardiology
Cardiology
Internal Medicine

## 2023-07-07 NOTE — PROGRESS NOTE ADULT - PROBLEM SELECTOR PLAN 4
dc metoprolol started on admission , no concern for CAD   BP target <130/90 mmHg  DASH/TLC diet  on lisinopril at home , held due to normal BP

## 2023-07-07 NOTE — PROGRESS NOTE ADULT - NUTRITIONAL ASSESSMENT
This patient has been assessed with a concern for Malnutrition and has been determined to have a diagnosis/diagnoses of Severe protein-calorie malnutrition and Underweight (BMI < 19).    This patient is being managed with:   Diet DASH/TLC-  Sodium & Cholesterol Restricted  No Caffeine  Supplement Feeding Modality:  Oral  Ensure Enlive Cans or Servings Per Day:  1       Frequency:  Two Times a day  Entered: Jul  3 2023  6:27PM  

## 2023-07-07 NOTE — PROGRESS NOTE ADULT - ASSESSMENT
A 76 year old female, from home, ambulating w/ a walker,  w/ PMHx of Anemia, Asthma, HTN, ITP, Muscular dystrophy, Polymyositis (prev on prednisone), and PE (prev on Eliquis), presented to the ED due to chest pain and dyspnea that started over 3 weeks ago, but has become more frequent recently. BP of 142/81 mmHg, HR of 74, RR 19, Temp of 98.3 F, saturating at 100% on NRBM 15L. Now off NRBM saturating at 100% on RA. EKG showed NSR with Troponin of 22.6. CTA chest was negative for PE, but showed Cardiomegaly and low lung volumes with bibasilar atelectasis. However, chronic fracture of the upper sternal body noted. Negative for Covid-19 and Influenza. Admitted to telemetry for Unstable angina/ACS workup. A 76 year old female, from home, ambulating w/ a walker,  w/ PMHx of Anemia, Asthma, HTN, ITP, Muscular dystrophy, Polymyositis (prev on prednisone), and PE (prev on Eliquis), presented to the ED due to chest pain and dyspnea that started over 3 weeks ago, but has become more frequent recently. BP of 142/81 mmHg, HR of 74, RR 19, Temp of 98.3 F, saturating at 100% on NRBM 15L. Now off NRBM saturating at 100% on RA. EKG showed NSR with Troponin of 22.6. CTA chest was negative for PE, but showed Cardiomegaly and low lung volumes with bibasilar atelectasis. However, chronic fracture of the upper sternal body noted. Negative for Covid-19 and Influenza. Admitted to telemetry for Unstable angina/ACS workup. Patient discharged 7/7.

## 2023-07-07 NOTE — PROGRESS NOTE ADULT - PROBLEM SELECTOR PLAN 5
IMPROVED FOR VTE SCORE OF 1 POINTS   MARCI SCORE (VTE PROPHYLAXIS) SCORE OF 3 POINT, HIGH RISK   Lovenox 40 mg SQ q24hr
-On albuterol  -Resume
IMPROVEDD FOR VTE SCORE OF 1 POINTS   MARCI SCORE (VTE PROPHYLAXIS) SCORE OF 3 POINT, HIGH RISK   Lovenox 40 mg SQ q24hr
-On albuterol  -Resume
-On albuterol  -Resume

## 2023-07-07 NOTE — PROGRESS NOTE ADULT - PROBLEM SELECTOR PLAN 7
IMPROVED FOR VTE SCORE OF 1 POINTS   MARCI SCORE (VTE PROPHYLAXIS) SCORE OF 3 POINT, HIGH RISK   Lovenox 40 mg SQ q24hr

## 2023-07-07 NOTE — PROGRESS NOTE ADULT - REASON FOR ADMISSION
Chest pain/ACS workup

## 2023-08-16 NOTE — PROGRESS NOTE ADULT - PROBLEM SELECTOR PLAN 2
[address] [facility] ED  eMERGENCY dEPARTMENT eNCOUnter   Independent Attestation     Pt Name: Jair Solares  MRN: [id number]  [address] [facility] 1997  Date of evaluation: 8/16/23       Jair Solares is a 27 y.o. male who presents with Pharyngitis (Was here on Sunday, rapid strep was negative, returning for worsening pain, and increased swelling) and Headache        Based on the medical record, the care appears appropriate. I was personally available for consultation in the Emergency Department.     Prasanth Bird DO  Attending Emergency  Physician                Prasanth Bird DO  08/16/23 [id number] - Pt found to be confused 7/4, as per son pt does get confused time to time at home as well   - UA neg   - CTH neg for infarct or bleed  - Blood cultures showed no growth (7/4)  - Palliative consulted - Pt found to be confused 7/4, as per son pt does get confused time to time at home as well   - UA neg   - CTH neg for infarct or bleed  - Blood cultures showed no growth (7/4)

## 2023-11-02 NOTE — ED PROVIDER NOTE - BIRTH SEX
"  Problem: Adult Inpatient Plan of Care  Goal: Plan of Care Review  Description: The Plan of Care Review/Shift note should be completed every shift.  The Outcome Evaluation is a brief statement about your assessment that the patient is improving, declining, or no change.  This information will be displayed automatically on your shift  note.  Outcome: Progressing  Flowsheets (Taken 11/2/2023 0705)  Plan of Care Reviewed With: patient  Overall Patient Progress: no change  Goal: Patient-Specific Goal (Individualized)  Description: You can add care plan individualizations to a care plan. Examples of Individualization might be:  \"Parent requests to be called daily at 9am for status\", \"I have a hard time hearing out of my right ear\", or \"Do not touch me to wake me up as it startles  me\".  Outcome: Progressing  Goal: Absence of Hospital-Acquired Illness or Injury  Outcome: Progressing  Intervention: Identify and Manage Fall Risk  Recent Flowsheet Documentation  Taken 11/2/2023 0000 by Kita Tretn, RN  Safety Promotion/Fall Prevention:   activity supervised   clutter free environment maintained   assistive device/personal items within reach   room organization consistent   safety round/check completed  Intervention: Prevent and Manage VTE (Venous Thromboembolism) Risk  Recent Flowsheet Documentation  Taken 11/2/2023 0000 by Kita Trent, RN  VTE Prevention/Management: SCDs (sequential compression devices) off  Goal: Optimal Comfort and Wellbeing  Outcome: Progressing  Goal: Readiness for Transition of Care  Outcome: Progressing   Goal Outcome Evaluation:      Plan of Care Reviewed With: patient    Overall Patient Progress: no changeOverall Patient Progress: no change           " Female

## 2023-11-17 NOTE — PATIENT PROFILE ADULT - PATIENT REPRESENTATIVE: ( YOU CAN CHOOSE ANY PERSON THAT CAN ASSIST YOU WITH YOUR HEALTH CARE PREFERENCES, DOES NOT HAVE TO BE A SPOUSE, IMMEDIATE FAMILY OR SIGNIFICANT OTHER/PARTNER)
-VSS   -CBC stable, WBC 38, Plts 469 likely due to neulasta treatment, AM CBC pending   -CMP: K 4.9 Cr 1.1, Mag 1.9, Phos 5.2   -CT: Findings in keeping with acute small bowel obstruction, with a suspected transition to decompressed distal small bowel in the anterior lower abdomen, slightly to the left of midline, with relative proximity to remote operative sequela in the anterior abdominal wall. Enlarging of known right pleural effusion, which may be malignant. Lobular soft tissue within the mesentery and anterior abdominal wall, possibly metastasis.   -Antiemetics: scheduled Zofran IV and Compazine IV, PRN rectal phenergan  - Patient declines NGT placement at this time. Reports traumatic experience with NGT during last admission.   -Diet: NPO  -   -Pain control: Dilaudid PRN, rectal tylenol   -Daily CMP/Mg/Phos: Will replace electrolytes PRN with goal K >4.0, Mg > 2,  Phos >3   yes

## 2023-12-29 NOTE — DISCHARGE NOTE PROVIDER - NSDCFUADDINST_GEN_ALL_CORE_FT
Specialty Pharmacy Note     No prior authorization was needed for Tao capecitabine prescription. His medication is covered under his Medicare B insurance. Test claim in [facility] returned a $0 co-pay for both Capecitabine (Xeloda) prescriptions.    Ashlee Lamb - CARE COORDINATOR  12/29/2023  08:49 EST    
PLEASE NOTE THAT WE HAVE HELD YOUR WATER PILL AT THIS TIME. THERE IS NO CLINICAL INDICATION AT THIS TIME; YOU ARE NOT IN HEART FAILURE AT THIS TIME  YOUR PCP MAY RESUME IF CLINICALLY INDICATED AS OUTPATIENT LIKE WORSENING SHORTNESS OF BREATH.   IF BEING USED FOR LEG SWELLING WILL ADVISE TO REDUCE AMLODIPINE FIRST AS IT CAN CAUSE SOME LEG SWELLING IN SOME PATIENTS

## 2024-03-26 NOTE — ED ADULT NURSE NOTE - NS_NURSE_DISC_TEACHING_YN_ED_ALL_ED
-Was noted to be stable at the cardiology appointment on echocardiogram compared to 2019 and continue to follow with her cardiologist   Yes

## 2024-09-17 NOTE — ED ADULT NURSE NOTE - NS PRO PASSIVE SMOKE EXP
"Hayde is a 55 year old who is being evaluated via a billable video visit.      The patient has been notified of following:     \"This video visit will be conducted via a call between you and your physician/provider. We have found that certain health care needs can be provided without the need for an in-person physical exam.  This service lets us provide the care you need with a video conversation.  If a prescription is necessary we can send it directly to your pharmacy.  If lab work is needed we can place an order for that and you can then stop by our lab to have the test done at a later time.    Video visits are billed at different rates depending on your insurance coverage.  Please reach out to your insurance provider with any questions.    If during the course of the call the physician/provider feels a video visit is not appropriate, you will not be charged for this service.\"    Patient has given verbal consent for Video visit? Yes    How would you like to obtain your AVS? MyChart    If the video visit is dropped, the invitation should be resent by: My Chart  Will anyone else be joining your video visit? No    I    Video-Visit Details    Type of service:  Video Visit    Originating Location (pt. Location): Home office in [place]     Distant Location (provider location):   [facility]    Platform used for Video Visit: [email or web address]    Video Start Time: 11:37 AM    Video End Time: around 11:45 am    10/1/2024      Return Medical Weight Management Note     Hayde Monique  MRN:  [id number]  :  1969    Assessment & Plan   Problem List Items Addressed This Visit       Elevated cholesterol    Relevant Medications    tirzepatide-Weight Management (ZEPBOUND) 7.5 MG/0.5ML prefilled pen    tirzepatide-Weight Management (ZEPBOUND) 10 MG/0.5ML prefilled pen     Other Visit Diagnoses       Class 1 obesity with serious comorbidity and body mass index (BMI) of 33.0 to 33.9 in adult, unspecified " obesity type    -  Primary    Relevant Medications    tirzepatide-Weight Management (ZEPBOUND) 7.5 MG/0.5ML prefilled pen    tirzepatide-Weight Management (ZEPBOUND) 10 MG/0.5ML prefilled pen             AOM Considerations:  Phentermine:   SSRI consideration with paroxetine and buspirone  Topiramate: Birth control: PostmenopausalCNS depression with paroxetine  GLP-1: Monitor blood sugars, Wegovy and Saxenda appear covered   Naltrexone: No drug interactions seen             Wellbutrin: Monitor response to paroxetine  Metformin: Monitor blood sugars          Contrave:  Qsymia:        PATIENT INSTRUCTIONS:  Pt Instructions:  Let's switch from 2.4 mg Wegovy to 7.5 mg Zepbound once weekly. Do this dose for at least one month - if tolerating well, you can increase to 10 mg Zepbound.    Goals:  Continue to Focus on healthy food choices - prioritizing protein and veggies in your meals. I recommend eating every 4-6 hours to reduce the risk of low blood sugars and try to minimize snacking between meals. Stay well hydrated though the day as well.  Great job with exercising - please continue to invest in yourself with regular exercise. Continue to strive for a range for 150-300 minutes of exercise for weight maintenance and incorporating strength training twice a week to help preserve muscle!    Hoping switching that med is helpful for you!    Follow up:    Call [phone] to schedule next visit in next available. Be in touch on Mychart for refills/concerns between visits    12 minutes spent on the date of the encounter doing chart review, history and exam, result review, counseling, developing plan of care, documentation, and further activities as noted      INTERVAL HISTORY:  Hayde returns for medical weight management follow up.  Last seen on 5/28/2024 with myself plan at that time to continue Wegovy considering switching to Zepbound once supply is improved.    Insurance denied Wegovy 8/20/2024 - but patient reports that  was covered with her other insurance but seems to be a bit of a weight plateau this summer.    Is really noting fatigue side effect as reported below with Wegovy that influences her energy and motivation.    Has two pen of Wegovy - does cover Zepbound with a PA     Limited Drinking, sleeping better, mental health is better, better eating   Losing more weight   Walking a lot, started using weights on arms   motivation and energy   Great adding weights and reviewed working on muscle conservation.     WEIGHT METRICS:  Body mass index is 33.81 kg/m .   Current Weight: 197 lb (89.4 kg) (patient reported)  Last Visits Weight: 208 lb (94.3 kg)  Initial Weight (lbs): 234.8 lbs  Cumulative weight loss (lbs): 37.8  Weight Loss Percentage: 16.1%    Wt Readings from Last 10 Encounters:   10/01/24 197 lb (89.4 kg)   05/28/24 208 lb (94.3 kg)   04/18/24 214 lb (97.1 kg)   02/12/24 221 lb (100.2 kg)   01/08/24 224 lb (101.6 kg)   11/06/23 230 lb (104.3 kg)   10/05/23 232 lb (105.2 kg)   08/04/23 234 lb 12.8 oz (106.5 kg)   08/03/23 234 lb 12.8 oz (106.5 kg)   06/22/23 233 lb (105.7 kg)                 9/30/2024     9:18 AM   Weight Loss Medication History Reviewed With Patient   Which weight loss medications are you currently taking on a regular basis? Wegovy   Are you having any side effects from the weight loss medication that we have prescribed you? No           9/30/2024     9:18 AM   Changes and Difficulties   I have made the following changes to my diet since my last visit: Limited Drinking, sleeping better, mental health is better, better eating   With regards to my diet, I am still struggling with: Losing more weight   I have made the following changes to my activity/exercise since my last visit: Walking a lot, started using weights on arms   With regards to my activity/exercise, I am still struggling with: motivation and energy   Shot on Thursdays - tired/low motivation and wears off over 4 days - thinks could do better.  "    LABS:  Reviewed in Epic    1/18/2024 BMP normal    BP Readings from Last 6 Encounters:   04/18/24 124/74   10/05/23 118/78   08/03/23 138/82   06/22/23 130/88   05/30/23 134/80   05/03/23 136/85       Pulse Readings from Last 6 Encounters:   04/18/24 78   10/05/23 75   08/03/23 83   05/30/23 99   05/03/23 86   01/11/23 70       PE:  Ht 5' 4\" (1.626 m)   Wt 197 lb (89.4 kg)   BMI 33.81 kg/m    GENERAL: Healthy, alert and no distress  EYES: Eyes grossly normal to inspection.    RESP: No audible wheeze, cough, or visible cyanosis.  No increased work of breathing.    SKIN: Visible skin clear. No significant rash, abnormal pigmentation or lesions.  NEURO: Mentation and speech appropriate for age.  PSYCH: Mentation appears normal, affect normal/bright, judgement and insight intact, normal speech and appearance well-groomed.      Sincerely,      Kate Gupta PA-C       " No

## 2024-10-20 NOTE — ED ADULT NURSE NOTE - NSIMPLEMENTINTERV_GEN_ALL_ED
Patient presented with bloody stool and low blood pressure. Was notified by ED team. initial labs show hemoglobin of 9. Patient received IV fluids with good response. No active bleeding in ED.   Recs  MICU evaluation   Monitor CBC   Notify GI with any change in status.   PPI infusion   GI will follow Implemented All Universal Safety Interventions:  [place] to call system. Call bell, personal items and telephone within reach. Instruct patient to call for assistance. Room bathroom lighting operational. Non-slip footwear when patient is off stretcher. Physically safe environment: no spills, clutter or unnecessary equipment. Stretcher in lowest position, wheels locked, appropriate side rails in place.